# Patient Record
Sex: FEMALE | Race: WHITE | NOT HISPANIC OR LATINO | Employment: FULL TIME | URBAN - NONMETROPOLITAN AREA
[De-identification: names, ages, dates, MRNs, and addresses within clinical notes are randomized per-mention and may not be internally consistent; named-entity substitution may affect disease eponyms.]

---

## 2017-06-13 ENCOUNTER — HOSPITAL ENCOUNTER (EMERGENCY)
Facility: HOSPITAL | Age: 52
Discharge: HOME | End: 2017-06-13
Attending: FAMILY MEDICINE | Admitting: FAMILY MEDICINE
Payer: COMMERCIAL

## 2017-06-13 VITALS
HEART RATE: 64 BPM | RESPIRATION RATE: 16 BRPM | HEIGHT: 65 IN | SYSTOLIC BLOOD PRESSURE: 114 MMHG | OXYGEN SATURATION: 97 % | TEMPERATURE: 98.3 F | WEIGHT: 160 LBS | BODY MASS INDEX: 26.66 KG/M2 | DIASTOLIC BLOOD PRESSURE: 75 MMHG

## 2017-06-13 DIAGNOSIS — J98.01 BRONCHOSPASM: ICD-10-CM

## 2017-06-13 DIAGNOSIS — M94.0 COSTOCHONDRITIS: ICD-10-CM

## 2017-06-13 DIAGNOSIS — K21.00 GASTROESOPHAGEAL REFLUX DISEASE WITH ESOPHAGITIS: ICD-10-CM

## 2017-06-13 LAB
ANION GAP SERPL CALCULATED.3IONS-SCNC: 7 MMOL/L (ref 3–14)
BASOPHILS # BLD AUTO: 0.1 10E9/L (ref 0–0.2)
BASOPHILS NFR BLD AUTO: 0.9 %
BUN SERPL-MCNC: 10 MG/DL (ref 7–30)
CALCIUM SERPL-MCNC: 9 MG/DL (ref 8.5–10.1)
CHLORIDE SERPL-SCNC: 105 MMOL/L (ref 94–109)
CO2 SERPL-SCNC: 27 MMOL/L (ref 20–32)
CREAT SERPL-MCNC: 0.94 MG/DL (ref 0.52–1.04)
DIFFERENTIAL METHOD BLD: NORMAL
EOSINOPHIL # BLD AUTO: 0.3 10E9/L (ref 0–0.7)
EOSINOPHIL NFR BLD AUTO: 4.6 %
ERYTHROCYTE [DISTWIDTH] IN BLOOD BY AUTOMATED COUNT: 12 % (ref 10–15)
GFR SERPL CREATININE-BSD FRML MDRD: 63 ML/MIN/1.7M2
GLUCOSE SERPL-MCNC: 91 MG/DL (ref 70–99)
HCT VFR BLD AUTO: 42.3 % (ref 35–47)
HGB BLD-MCNC: 14.6 G/DL (ref 11.7–15.7)
IMM GRANULOCYTES # BLD: 0 10E9/L (ref 0–0.4)
IMM GRANULOCYTES NFR BLD: 0.3 %
LYMPHOCYTES # BLD AUTO: 1.9 10E9/L (ref 0.8–5.3)
LYMPHOCYTES NFR BLD AUTO: 25.2 %
MCH RBC QN AUTO: 32.2 PG (ref 26.5–33)
MCHC RBC AUTO-ENTMCNC: 34.5 G/DL (ref 31.5–36.5)
MCV RBC AUTO: 93 FL (ref 78–100)
MONOCYTES # BLD AUTO: 0.8 10E9/L (ref 0–1.3)
MONOCYTES NFR BLD AUTO: 11.1 %
NEUTROPHILS # BLD AUTO: 4.3 10E9/L (ref 1.6–8.3)
NEUTROPHILS NFR BLD AUTO: 57.9 %
NRBC # BLD AUTO: 0 10*3/UL
NRBC BLD AUTO-RTO: 0 /100
PLATELET # BLD AUTO: 264 10E9/L (ref 150–450)
POTASSIUM SERPL-SCNC: 3.9 MMOL/L (ref 3.4–5.3)
RBC # BLD AUTO: 4.54 10E12/L (ref 3.8–5.2)
SODIUM SERPL-SCNC: 139 MMOL/L (ref 133–144)
TROPONIN I SERPL-MCNC: NORMAL UG/L (ref 0–0.04)
WBC # BLD AUTO: 7.4 10E9/L (ref 4–11)

## 2017-06-13 PROCEDURE — 36415 COLL VENOUS BLD VENIPUNCTURE: CPT | Performed by: FAMILY MEDICINE

## 2017-06-13 PROCEDURE — 96375 TX/PRO/DX INJ NEW DRUG ADDON: CPT

## 2017-06-13 PROCEDURE — 94664 DEMO&/EVAL PT USE INHALER: CPT | Mod: 59

## 2017-06-13 PROCEDURE — 84484 ASSAY OF TROPONIN QUANT: CPT | Performed by: FAMILY MEDICINE

## 2017-06-13 PROCEDURE — 94640 AIRWAY INHALATION TREATMENT: CPT

## 2017-06-13 PROCEDURE — 93010 ELECTROCARDIOGRAM REPORT: CPT | Performed by: INTERNAL MEDICINE

## 2017-06-13 PROCEDURE — 25000125 ZZHC RX 250: Performed by: FAMILY MEDICINE

## 2017-06-13 PROCEDURE — 80048 BASIC METABOLIC PNL TOTAL CA: CPT | Performed by: FAMILY MEDICINE

## 2017-06-13 PROCEDURE — 71020 ZZHC CHEST TWO VIEWS, FRONT/LAT: CPT | Mod: TC

## 2017-06-13 PROCEDURE — 25000132 ZZH RX MED GY IP 250 OP 250 PS 637: Performed by: FAMILY MEDICINE

## 2017-06-13 PROCEDURE — 85025 COMPLETE CBC W/AUTO DIFF WBC: CPT | Performed by: FAMILY MEDICINE

## 2017-06-13 PROCEDURE — 99285 EMERGENCY DEPT VISIT HI MDM: CPT | Performed by: FAMILY MEDICINE

## 2017-06-13 PROCEDURE — 93005 ELECTROCARDIOGRAM TRACING: CPT

## 2017-06-13 PROCEDURE — 25000128 H RX IP 250 OP 636: Performed by: FAMILY MEDICINE

## 2017-06-13 PROCEDURE — 99285 EMERGENCY DEPT VISIT HI MDM: CPT | Mod: 25

## 2017-06-13 PROCEDURE — 96374 THER/PROPH/DIAG INJ IV PUSH: CPT

## 2017-06-13 RX ORDER — IBUPROFEN 200 MG
600 TABLET ORAL EVERY 6 HOURS PRN
Qty: 60 TABLET | Refills: 0 | COMMUNITY
Start: 2017-06-13

## 2017-06-13 RX ORDER — METHYLPREDNISOLONE SODIUM SUCCINATE 125 MG/2ML
125 INJECTION, POWDER, LYOPHILIZED, FOR SOLUTION INTRAMUSCULAR; INTRAVENOUS ONCE
Status: COMPLETED | OUTPATIENT
Start: 2017-06-13 | End: 2017-06-13

## 2017-06-13 RX ORDER — ONDANSETRON 2 MG/ML
4 INJECTION INTRAMUSCULAR; INTRAVENOUS ONCE
Status: COMPLETED | OUTPATIENT
Start: 2017-06-13 | End: 2017-06-13

## 2017-06-13 RX ORDER — LANSOPRAZOLE 30 MG/1
30 CAPSULE, DELAYED RELEASE ORAL 2 TIMES DAILY
Qty: 30 CAPSULE | Refills: 0 | Status: SHIPPED | OUTPATIENT
Start: 2017-06-13 | End: 2019-07-17

## 2017-06-13 RX ORDER — IPRATROPIUM BROMIDE AND ALBUTEROL SULFATE 2.5; .5 MG/3ML; MG/3ML
3 SOLUTION RESPIRATORY (INHALATION)
Status: DISCONTINUED | OUTPATIENT
Start: 2017-06-13 | End: 2017-06-13 | Stop reason: HOSPADM

## 2017-06-13 RX ORDER — ALBUTEROL SULFATE 90 UG/1
2 AEROSOL, METERED RESPIRATORY (INHALATION) 4 TIMES DAILY
Qty: 1 INHALER | Refills: 0 | Status: SHIPPED | OUTPATIENT
Start: 2017-06-13

## 2017-06-13 RX ORDER — ASPIRIN 81 MG/1
324 TABLET, CHEWABLE ORAL ONCE
Status: COMPLETED | OUTPATIENT
Start: 2017-06-13 | End: 2017-06-13

## 2017-06-13 RX ORDER — KETOROLAC TROMETHAMINE 30 MG/ML
30 INJECTION, SOLUTION INTRAMUSCULAR; INTRAVENOUS ONCE
Status: COMPLETED | OUTPATIENT
Start: 2017-06-13 | End: 2017-06-13

## 2017-06-13 RX ADMIN — ASPIRIN 81 MG 324 MG: 81 TABLET ORAL at 13:59

## 2017-06-13 RX ADMIN — ONDANSETRON 4 MG: 2 INJECTION, SOLUTION INTRAMUSCULAR; INTRAVENOUS at 14:02

## 2017-06-13 RX ADMIN — METHYLPREDNISOLONE SODIUM SUCCINATE 125 MG: 125 INJECTION, POWDER, FOR SOLUTION INTRAMUSCULAR; INTRAVENOUS at 14:00

## 2017-06-13 RX ADMIN — IPRATROPIUM BROMIDE AND ALBUTEROL SULFATE 3 ML: .5; 3 SOLUTION RESPIRATORY (INHALATION) at 13:57

## 2017-06-13 RX ADMIN — KETOROLAC TROMETHAMINE 30 MG: 30 INJECTION, SOLUTION INTRAMUSCULAR at 16:23

## 2017-06-13 ASSESSMENT — ENCOUNTER SYMPTOMS
MUSCULOSKELETAL NEGATIVE: 1
PSYCHIATRIC NEGATIVE: 1
VOMITING: 0
LIGHT-HEADEDNESS: 0
DIAPHORESIS: 0
ACTIVITY CHANGE: 1
FATIGUE: 0
ABDOMINAL PAIN: 0
PALPITATIONS: 0
NAUSEA: 1
DIARRHEA: 0
SHORTNESS OF BREATH: 0
DIZZINESS: 0
CONSTIPATION: 0
DYSURIA: 0
WEAKNESS: 0
HEADACHES: 0

## 2017-06-13 NOTE — ED AVS SNAPSHOT
[facility]    [address] [address]    Phone:  [phone]                                       Debra Park   MRN: [id number]    Department:  [facility]   Date of Visit:  6/13/2017           Patient Information     Date Of Birth          1965        Your diagnoses for this visit were:     Bronchospasm     Gastroesophageal reflux disease with esophagitis     Costochondritis        You were seen by Saskia Parada MD.      Follow-up Information     Follow up with Kavon Morales DO In 1 week.    Specialty:  Family Practice    Why:  follow up ED    Contact information:    [facility]  [address] [facility] [address]  [phone]          Discharge Instructions         Costochondritis  Costochondritis is inflammation of a rib or the cartilage that connects a rib to your breastbone (sternum). It causes tenderness, and sometimes chest pain may be sharp or aching, or it may feel like pressure. Pain may get worse with deep breathing, movement, or exercise. In some cases, the pain is mistaken for a heart attack. Despite this, the condition is not serious. Read on to learn more about the condition and how it can be treated.    What causes costochondritis?  The cause of costochondritis is not completely clear, but it may happen after a chest injury, chest infection or coughing episode. Some physical activities can sometimes lead to costochondritis. Large-breasted women may be more likely to have the condition. Often, the reason for the inflammation is unknown.  Diagnosing costochondritis  There is no test for costochrondritis. The condition is diagnosed by the symptoms you have. In some cases, tests are done to rule out more serious problems. These tests may include imaging tests such as chest X-ray or CT scan.  Treating costochondritis  If an underlying cause is found, treatment for that will likely relieve the problem.  Costochondritis often goes away on its own. The course of the condition varies from person to person. It usually lasts from weeks to months. In some cases, mild symptoms continue for months to years. To ease symptoms:    Take medications as directed. These relieve pain and swelling. Ibuprofen or other NSAIDs are often recommended. In some cases, you may be given prescription medication, such as muscle relaxants.    Avoid activities that put stress on the chest or spine.    Apply a heating pad (set to warm, not to high, heat) to the breastbone several times a day.    Perform stretching exercises as directed  Call the health care provider right away if you have any of the following:    Pain that is not relieved by medication    Shortness of breath    Lightheadedness, dizziness, or fainting    Feeling of irregular heartbeat or fast pulse  Anyone with chest pain should see a doctor, especially those who are older and may be at risk for heart disease.     9615-4668 [employer]. [address] [place] [address]. All rights reserved. This information is not intended as a substitute for professional medical care. Always follow your healthcare professional's instructions.          Bronchospasm (Adult)    Bronchospasm occurs when the airways (bronchial tubes) go into spasm and contract. This makes it hard to breathe and causes wheezing (a high-pitched whistling sound). Bronchospasm can also cause frequent coughing without wheezing.  Bronchospasm is due to irritation, inflammation, or allergic reaction of the airways. People with asthma get bronchospasm. However, not everyone with bronchospasm has asthma.  Being exposed to harmful fumes, a recent case of bronchitis, exercise, or a flare-up of chronic obstructive pulmonary disease (COPD) may cause the airways to spasm. An episode of bronchospasm may last 7 to 14 days. Medicine may be prescribed to relax the airways and prevent wheezing. Antibiotics will  be prescribed only if your healthcare provider thinks there is a bacterial infection. Antibiotics do not help a viral infection.  Home care     Drink lots of water or other fluids (at least 10 glasses a day) during an attack. This will loosen lung secretions and make it easier to breathe. If you have heart or kidney disease, check with your doctor before you drink extra fluids.    Take prescribed medicine exactly at the times advised. If you take an inhaled medicine to help with breathing, do not use it more than once every 4 hours, unless told to do so. If prescribed an antibiotic or prednisone, take all of the medicine, even if you are feeling better after a few days.    Do not smoke. Also avoid being exposed to secondhand smoke.    If you were given an inhaler, use it exactly as directed. If you need to use it more often than prescribed, your condition may be getting worse. Contact your healthcare provider.  Follow-up care  Follow up with your healthcare provider, or as advised.   (Note: If you are age 65 or older, have a chronic lung disease or condition that affects your immune system, or you smoke, we recommend getting pneumococcal vaccinations, as well as an influenza vaccination (flu shot) every autumn. Ask your healthcare provider about this.)  When to seek medical advice  Call your healthcare provider right away if any of these occur:    You need to use your inhalers more often than usual.    You develop a fever of 100.4 F (38 C) or higher.    You are coughing up lots of dark-colored sputum (mucus).    You do not start to improve within 24 hours.  Call 911, or get immediate medical care  Contact emergency services if any of these occur:    Coughing up bloody sputum (mucus)    Chest pain with each breath    Increased wheezing or shortness of breath    8445-8574 [employer]. [address] [place] [address], [place] [address]. All rights reserved. This information is not intended as a substitute for  professional medical care. Always follow your healthcare professional's instructions.          Tips to Control Acid Reflux  To control acid reflux, you ll need to make some basic diet and lifestyle changes. The simple steps outlined below may be all you ll need to relieve discomfort.  Watch What You Eat      Avoid fatty foods and spicy foods.    Eat fewer acidic foods, such as citrus and tomato-based foods. These can increase symptoms.    Limit drinking alcohol, caffeine, and fizzy beverages. All increase acid reflux.    Try limiting chocolate, peppermint, and spearmint. These can worsen acid reflux in some people.  Watch When You Eat    Avoid lying down for 3 hours after eating.    Do not snack before going to bed.  Raise Your Head    Raising your head and upper body by 4 inches to 6 inches helps limit reflux when you re lying down. Put blocks under the head of the bed frame to raise it.  Other Changes    Lose weight, if you need to.    Don t work out near bedtime.    Avoid tight-fitting clothes.    Limit aspirin and ibuprofen.    Stop smoking.     8052-6263 [employer]. [address] [place] [address]. All rights reserved. This information is not intended as a substitute for professional medical care. Always follow your healthcare professional's instructions.             Review of your medicines      START taking        Dose / Directions Last dose taken    albuterol 108 (90 BASE) MCG/ACT Inhaler   Commonly known as:  albuterol   Dose:  2 puff   Quantity:  1 Inhaler        Inhale 2 puffs into the lungs 4 times daily And PRN   Refills:  0        ibuprofen 200 MG tablet   Commonly known as:  ADVIL/MOTRIN   Dose:  600 mg   Quantity:  60 tablet        Take 3 tablets (600 mg) by mouth every 6 hours as needed for pain   Refills:  0          CONTINUE these medicines which may have CHANGED, or have new prescriptions. If we are uncertain of the size of tablets/capsules you have at home, strength may  be listed as something that might have changed.        Dose / Directions Last dose taken    LANsoprazole 30 MG CR capsule   Commonly known as:  PREVACID   Dose:  30 mg   What changed:    - medication strength  - how much to take  - when to take this  - additional instructions   Quantity:  30 capsule        Take 1 capsule (30 mg) by mouth 2 times daily For 2 weeks, then daily.   Refills:  0          Our records show that you are taking the medicines listed below. If these are incorrect, please call your family doctor or clinic.        Dose / Directions Last dose taken    CELEXA PO   Dose:  40 mg        Take 40 mg by mouth daily.   Refills:  0        HYDROcodone-acetaminophen 5-325 MG per tablet   Commonly known as:  NORCO   Dose:  1 tablet        Take 1 tablet by mouth every 6 hours as needed.   Refills:  0        WELLBUTRIN PO   Dose:  150 mg        Take 150 mg by mouth daily   Refills:  0                Prescriptions were sent or printed at these locations (3 Prescriptions)                   [facility] [address] [address]    Telephone:  [phone]   Fax:  [phone]   Hours:                  E-Prescribed (2 of 3)         LANsoprazole (PREVACID) 30 MG CR capsule               albuterol (ALBUTEROL) 108 (90 BASE) MCG/ACT Inhaler                 Not Printed or Sent (1 of 3)         ibuprofen (ADVIL/MOTRIN) 200 MG tablet                Procedures and tests performed during your visit     Basic metabolic panel    CBC with platelets differential    Cardiac Continuous Monitoring    Peripheral IV: Standard    Pulse oximetry nursing    Troponin I    XR Chest 2 Views      Orders Needing Specimen Collection     None      Pending Results     No orders found from 6/11/2017 to 6/14/2017.            Pending Culture Results     No orders found from 6/11/2017 to 6/14/2017.            Thank you for choosing [facility]       Thank you for choosing [facility] for  "your care. Our goal is always to provide you with excellent care. Hearing back from our patients is one way we can continue to improve our services. Please take a few minutes to complete the written survey that you may receive in the mail after you visit with us. Thank you!        [employer] Information     [email or web address] lets you send messages to your doctor, view your test results, renew your prescriptions, schedule appointments and more. To sign up, go to [email or web address] . Click on \"Log in\" on the left side of the screen, which will take you to the Welcome page. Then click on \"Sign up Now\" on the right side of the page.     You will be asked to enter the access code listed below, as well as some personal information. Please follow the directions to create your username and password.     Your access code is: [email or web address]  Expires: 2017  5:15 PM     Your access code will  in 90 days. If you need help or a new code, please call your [facility] clinic or [phone].        Care EveryWhere ID     This is your Care EveryWhere ID. This could be used by other organizations to access your [facility] medical records  [id number]        After Visit Summary       This is your record. Keep this with you and show to your community pharmacist(s) and doctor(s) at your next visit.                  "

## 2017-06-13 NOTE — DISCHARGE INSTRUCTIONS
Costochondritis  Costochondritis is inflammation of a rib or the cartilage that connects a rib to your breastbone (sternum). It causes tenderness, and sometimes chest pain may be sharp or aching, or it may feel like pressure. Pain may get worse with deep breathing, movement, or exercise. In some cases, the pain is mistaken for a heart attack. Despite this, the condition is not serious. Read on to learn more about the condition and how it can be treated.    What causes costochondritis?  The cause of costochondritis is not completely clear, but it may happen after a chest injury, chest infection or coughing episode. Some physical activities can sometimes lead to costochondritis. Large-breasted women may be more likely to have the condition. Often, the reason for the inflammation is unknown.  Diagnosing costochondritis  There is no test for costochrondritis. The condition is diagnosed by the symptoms you have. In some cases, tests are done to rule out more serious problems. These tests may include imaging tests such as chest X-ray or CT scan.  Treating costochondritis  If an underlying cause is found, treatment for that will likely relieve the problem. Costochondritis often goes away on its own. The course of the condition varies from person to person. It usually lasts from weeks to months. In some cases, mild symptoms continue for months to years. To ease symptoms:    Take medications as directed. These relieve pain and swelling. Ibuprofen or other NSAIDs are often recommended. In some cases, you may be given prescription medication, such as muscle relaxants.    Avoid activities that put stress on the chest or spine.    Apply a heating pad (set to warm, not to high, heat) to the breastbone several times a day.    Perform stretching exercises as directed  Call the health care provider right away if you have any of the following:    Pain that is not relieved by medication    Shortness of breath    Lightheadedness,  dizziness, or fainting    Feeling of irregular heartbeat or fast pulse  Anyone with chest pain should see a doctor, especially those who are older and may be at risk for heart disease.     4256-9692 [employer]. [address] [place] [address], [place] [address]. All rights reserved. This information is not intended as a substitute for professional medical care. Always follow your healthcare professional's instructions.          Bronchospasm (Adult)    Bronchospasm occurs when the airways (bronchial tubes) go into spasm and contract. This makes it hard to breathe and causes wheezing (a high-pitched whistling sound). Bronchospasm can also cause frequent coughing without wheezing.  Bronchospasm is due to irritation, inflammation, or allergic reaction of the airways. People with asthma get bronchospasm. However, not everyone with bronchospasm has asthma.  Being exposed to harmful fumes, a recent case of bronchitis, exercise, or a flare-up of chronic obstructive pulmonary disease (COPD) may cause the airways to spasm. An episode of bronchospasm may last 7 to 14 days. Medicine may be prescribed to relax the airways and prevent wheezing. Antibiotics will be prescribed only if your healthcare provider thinks there is a bacterial infection. Antibiotics do not help a viral infection.  Home care     Drink lots of water or other fluids (at least 10 glasses a day) during an attack. This will loosen lung secretions and make it easier to breathe. If you have heart or kidney disease, check with your doctor before you drink extra fluids.    Take prescribed medicine exactly at the times advised. If you take an inhaled medicine to help with breathing, do not use it more than once every 4 hours, unless told to do so. If prescribed an antibiotic or prednisone, take all of the medicine, even if you are feeling better after a few days.    Do not smoke. Also avoid being exposed to secondhand smoke.    If you were given an inhaler,  use it exactly as directed. If you need to use it more often than prescribed, your condition may be getting worse. Contact your healthcare provider.  Follow-up care  Follow up with your healthcare provider, or as advised.   (Note: If you are age 65 or older, have a chronic lung disease or condition that affects your immune system, or you smoke, we recommend getting pneumococcal vaccinations, as well as an influenza vaccination (flu shot) every autumn. Ask your healthcare provider about this.)  When to seek medical advice  Call your healthcare provider right away if any of these occur:    You need to use your inhalers more often than usual.    You develop a fever of 100.4 F (38 C) or higher.    You are coughing up lots of dark-colored sputum (mucus).    You do not start to improve within 24 hours.  Call 911, or get immediate medical care  Contact emergency services if any of these occur:    Coughing up bloody sputum (mucus)    Chest pain with each breath    Increased wheezing or shortness of breath    3068-2779 [employer]. [address] [place] [address]. All rights reserved. This information is not intended as a substitute for professional medical care. Always follow your healthcare professional's instructions.          Tips to Control Acid Reflux  To control acid reflux, you ll need to make some basic diet and lifestyle changes. The simple steps outlined below may be all you ll need to relieve discomfort.  Watch What You Eat      Avoid fatty foods and spicy foods.    Eat fewer acidic foods, such as citrus and tomato-based foods. These can increase symptoms.    Limit drinking alcohol, caffeine, and fizzy beverages. All increase acid reflux.    Try limiting chocolate, peppermint, and spearmint. These can worsen acid reflux in some people.  Watch When You Eat    Avoid lying down for 3 hours after eating.    Do not snack before going to bed.  Raise Your Head    Raising your head and upper body  by 4 inches to 6 inches helps limit reflux when you re lying down. Put blocks under the head of the bed frame to raise it.  Other Changes    Lose weight, if you need to.    Don t work out near bedtime.    Avoid tight-fitting clothes.    Limit aspirin and ibuprofen.    Stop smoking.     2248-0436 [employer]. [address] [place] [address], [place] [address]. All rights reserved. This information is not intended as a substitute for professional medical care. Always follow your healthcare professional's instructions.

## 2017-06-13 NOTE — ED AVS SNAPSHOT
[facility]    [address]    [place] [address]    Phone:  [phone]                                       Debra Park   MRN: [id number]    Department[facility]  [facility]   Date of Visit:  6/13/2017           After Visit Summary Signature Page     I have received my discharge instructions, and my questions have been answered. I have discussed any challenges I see with this plan with the nurse or doctor.    ..........................................................................................................................................  Patient/Patient Representative Signature      ..........................................................................................................................................  Patient Representative Print Name and Relationship to Patient    ..................................................               ................................................  Date                                            Time    ..........................................................................................................................................  Reviewed by Signature/Title    ...................................................              ..............................................  Date                                                            Time

## 2017-06-13 NOTE — ED PROVIDER NOTES
"  History     Chief Complaint   Patient presents with     Chest Pain     Started this am at 0900. Patient also C/O right lower rib pain and nausea, intermittent, X 3 days     HPI  Debra Park is a 52 year old female who has pressure-like chest pain in her upper chest radiating to her left scapula.  She also has pain in her right lower ribs, and has had intermittent nausea for 3 days.  Her pain does not increase with exertion.  She does have seasonal allergies, and has noted herself wheezing, especially at night.  She has no past history of cardiac issues, but has had GERD.  She is on Prevacid for this.      I have reviewed the Medications, Allergies, Past Medical and Surgical History, and Social History in the Epic system.    Allergies:   Allergies   Allergen Reactions     Augmentin Nausea and Vomiting     Cats Swelling         No current facility-administered medications on file prior to encounter.   Current Outpatient Prescriptions on File Prior to Encounter:  BuPROPion HCl (WELLBUTRIN PO) Take 150 mg by mouth daily    Citalopram Hydrobromide (CELEXA PO) Take 40 mg by mouth daily.   HYDROcodone-acetaminophen 5-325 MG per tablet Take 1 tablet by mouth every 6 hours as needed.       Patient Active Problem List   Diagnosis     Facial cellulitis       Past Surgical History:   Procedure Laterality Date     BREAST SURGERY      augmentation     CHOLECYSTECTOMY       ENT SURGERY      T&A     GYN SURGERY      endo ablation     ORTHOPEDIC SURGERY      rotator cuff repair     SOFT TISSUE SURGERY      basal cell ca       Social History   Substance Use Topics     Smoking status: Current Every Day Smoker     Packs/day: 1.00     Years: 35.00     Smokeless tobacco: Never Used     Alcohol use Yes      Comment: social         There is no immunization history on file for this patient.    BMI: Estimated body mass index is 26.63 kg/(m^2) as calculated from the following:    Height as of this encounter: 1.651 m (5' 5\").    Weight " "as of this encounter: 72.6 kg (160 lb).      Review of Systems   Constitutional: Positive for activity change. Negative for diaphoresis and fatigue.   HENT: Negative.    Respiratory: Negative for shortness of breath.    Cardiovascular: Positive for chest pain. Negative for palpitations.        Right lower ribs and sternocostal margin.   Gastrointestinal: Positive for nausea. Negative for abdominal pain, constipation, diarrhea and vomiting.   Genitourinary: Negative for dysuria.   Musculoskeletal: Negative.    Skin: Negative.    Neurological: Negative for dizziness, weakness, light-headedness and headaches.   Psychiatric/Behavioral: Negative.        Physical Exam   BP: (!) 145/102  Pulse: 64  Temp: 98.3  F (36.8  C)  Resp: 14  Height: 165.1 cm (5' 5\")  Weight: 72.6 kg (160 lb)  SpO2: 96 %  Physical Exam   Constitutional: She is oriented to person, place, and time. She appears well-developed and well-nourished. No distress.   HENT:   Head: Normocephalic and atraumatic.   Neck: Normal range of motion. Neck supple.   Cardiovascular: Normal rate, regular rhythm, normal heart sounds and intact distal pulses.    No murmur heard.  Pulmonary/Chest: Effort normal. No respiratory distress. She has wheezes. She exhibits tenderness.   Reproducible chest tenderness on palpation of the lower ribs on right, sternocostal border.  End expiratory wheezes throughout with prolonged expiratory phase.   Abdominal: Soft. Bowel sounds are normal. She exhibits no distension. There is no tenderness.   Musculoskeletal: Normal range of motion.   Neurological: She is alert and oriented to person, place, and time.   Skin: Skin is warm and dry.   Psychiatric: She has a normal mood and affect.   Nursing note and vitals reviewed.      ED Course     ED Course     Procedures             EKG Interpretation:      Interpreted by Saskia Smith  Time reviewed: 1335  Symptoms at time of EKG: chest pressure   Rhythm: normal sinus   Rate: " normal  Axis: left axis deviation  Ectopy: none  Conduction: normal  ST Segments/ T Waves: No ST-T wave changes  Q Waves: none  Comparison to prior: No previous EKG for comparison    Clinical Impression: normal EKG excepting axis    Labs Ordered and Resulted from Time of ED Arrival Up to the Time of Departure from the ED   CBC WITH PLATELETS DIFFERENTIAL   BASIC METABOLIC PANEL   TROPONIN I   PULSE OXIMETRY NURSING   CARDIAC CONTINUOUS MONITORING   PERIPHERAL IV CATHETER       Assessments & Plan (with Medical Decision Making)   Patient's chest pressure improved with nebulizer treatment, solumedrol.  Will discharge on Albuterol MDI, inhaler teaching by RT.  Chest pain in lower ribs is reproducible, will treat with ibuprofen since Toradol helped.  Will also increase Prevacid to BID for 2 weeks, then back to daily.  Follow up with Dr. Morales in a week.    I have reviewed the nursing notes.    I have reviewed the findings, diagnosis, plan and need for follow up with the patient.       New Prescriptions    ALBUTEROL (ALBUTEROL) 108 (90 BASE) MCG/ACT INHALER    Inhale 2 puffs into the lungs 4 times daily And PRN    IBUPROFEN (ADVIL/MOTRIN) 200 MG TABLET    Take 3 tablets (600 mg) by mouth every 6 hours as needed for pain       Final diagnoses:   Bronchospasm   Gastroesophageal reflux disease with esophagitis   Costochondritis       6/13/2017   [facility]     Saskia Parada MD  06/13/17 7713

## 2017-06-14 NOTE — PROGRESS NOTES
Pt instructed on MDI use and care with spacer via instruction video. Pt understood instruct and was sent with paper instructions and an opti chamber.

## 2017-09-23 ENCOUNTER — HOSPITAL ENCOUNTER (EMERGENCY)
Facility: HOSPITAL | Age: 52
Discharge: HOME | End: 2017-09-23
Attending: FAMILY MEDICINE | Admitting: FAMILY MEDICINE
Payer: COMMERCIAL

## 2017-09-23 VITALS
SYSTOLIC BLOOD PRESSURE: 147 MMHG | OXYGEN SATURATION: 94 % | RESPIRATION RATE: 16 BRPM | TEMPERATURE: 98.1 F | DIASTOLIC BLOOD PRESSURE: 100 MMHG | HEART RATE: 76 BPM

## 2017-09-23 DIAGNOSIS — R10.11 ABDOMINAL PAIN, RIGHT UPPER QUADRANT: ICD-10-CM

## 2017-09-23 LAB
ALBUMIN SERPL-MCNC: 4.1 G/DL (ref 3.4–5)
ALBUMIN UR-MCNC: NEGATIVE MG/DL
ALP SERPL-CCNC: 44 U/L (ref 40–150)
ALT SERPL W P-5'-P-CCNC: 27 U/L (ref 0–50)
ANION GAP SERPL CALCULATED.3IONS-SCNC: 5 MMOL/L (ref 3–14)
APPEARANCE UR: CLEAR
AST SERPL W P-5'-P-CCNC: 23 U/L (ref 0–45)
BACTERIA #/AREA URNS HPF: ABNORMAL /HPF
BASOPHILS # BLD AUTO: 0.1 10E9/L (ref 0–0.2)
BASOPHILS NFR BLD AUTO: 0.5 %
BILIRUB SERPL-MCNC: 0.3 MG/DL (ref 0.2–1.3)
BILIRUB UR QL STRIP: NEGATIVE
BUN SERPL-MCNC: 9 MG/DL (ref 7–30)
CALCIUM SERPL-MCNC: 9.1 MG/DL (ref 8.5–10.1)
CHLORIDE SERPL-SCNC: 106 MMOL/L (ref 94–109)
CO2 SERPL-SCNC: 27 MMOL/L (ref 20–32)
COLOR UR AUTO: ABNORMAL
CREAT SERPL-MCNC: 0.87 MG/DL (ref 0.52–1.04)
CRP SERPL-MCNC: <2.9 MG/L (ref 0–8)
DIFFERENTIAL METHOD BLD: NORMAL
EOSINOPHIL # BLD AUTO: 0.2 10E9/L (ref 0–0.7)
EOSINOPHIL NFR BLD AUTO: 2.2 %
ERYTHROCYTE [DISTWIDTH] IN BLOOD BY AUTOMATED COUNT: 12.2 % (ref 10–15)
GFR SERPL CREATININE-BSD FRML MDRD: 69 ML/MIN/1.7M2
GLUCOSE SERPL-MCNC: 83 MG/DL (ref 70–99)
GLUCOSE UR STRIP-MCNC: NEGATIVE MG/DL
HCT VFR BLD AUTO: 41.2 % (ref 35–47)
HGB BLD-MCNC: 14.5 G/DL (ref 11.7–15.7)
HGB UR QL STRIP: ABNORMAL
IMM GRANULOCYTES # BLD: 0 10E9/L (ref 0–0.4)
IMM GRANULOCYTES NFR BLD: 0.3 %
KETONES UR STRIP-MCNC: NEGATIVE MG/DL
LEUKOCYTE ESTERASE UR QL STRIP: NEGATIVE
LIPASE SERPL-CCNC: 153 U/L (ref 73–393)
LYMPHOCYTES # BLD AUTO: 2 10E9/L (ref 0.8–5.3)
LYMPHOCYTES NFR BLD AUTO: 18.7 %
MCH RBC QN AUTO: 32.7 PG (ref 26.5–33)
MCHC RBC AUTO-ENTMCNC: 35.2 G/DL (ref 31.5–36.5)
MCV RBC AUTO: 93 FL (ref 78–100)
MONOCYTES # BLD AUTO: 1 10E9/L (ref 0–1.3)
MONOCYTES NFR BLD AUTO: 9 %
MUCOUS THREADS #/AREA URNS LPF: PRESENT /LPF
NEUTROPHILS # BLD AUTO: 7.4 10E9/L (ref 1.6–8.3)
NEUTROPHILS NFR BLD AUTO: 69.3 %
NITRATE UR QL: NEGATIVE
NRBC # BLD AUTO: 0 10*3/UL
NRBC BLD AUTO-RTO: 0 /100
PH UR STRIP: 6.5 PH (ref 4.7–8)
PLATELET # BLD AUTO: 242 10E9/L (ref 150–450)
POTASSIUM SERPL-SCNC: 3.9 MMOL/L (ref 3.4–5.3)
PROT SERPL-MCNC: 7.2 G/DL (ref 6.8–8.8)
RBC # BLD AUTO: 4.43 10E12/L (ref 3.8–5.2)
RBC #/AREA URNS AUTO: 2 /HPF (ref 0–2)
SODIUM SERPL-SCNC: 138 MMOL/L (ref 133–144)
SOURCE: ABNORMAL
SP GR UR STRIP: 1 (ref 1–1.03)
UROBILINOGEN UR STRIP-MCNC: NORMAL MG/DL (ref 0–2)
WBC # BLD AUTO: 10.6 10E9/L (ref 4–11)
WBC #/AREA URNS AUTO: <1 /HPF (ref 0–2)

## 2017-09-23 PROCEDURE — 86140 C-REACTIVE PROTEIN: CPT | Performed by: FAMILY MEDICINE

## 2017-09-23 PROCEDURE — 80053 COMPREHEN METABOLIC PANEL: CPT | Performed by: FAMILY MEDICINE

## 2017-09-23 PROCEDURE — 74177 CT ABD & PELVIS W/CONTRAST: CPT | Mod: TC

## 2017-09-23 PROCEDURE — 99285 EMERGENCY DEPT VISIT HI MDM: CPT | Mod: 25

## 2017-09-23 PROCEDURE — 74020 ZZHC X-RAY ABDOMEN COMPLETE: CPT | Mod: TC

## 2017-09-23 PROCEDURE — 25000125 ZZHC RX 250: Performed by: FAMILY MEDICINE

## 2017-09-23 PROCEDURE — 36415 COLL VENOUS BLD VENIPUNCTURE: CPT | Performed by: FAMILY MEDICINE

## 2017-09-23 PROCEDURE — 85025 COMPLETE CBC W/AUTO DIFF WBC: CPT | Performed by: FAMILY MEDICINE

## 2017-09-23 PROCEDURE — 99285 EMERGENCY DEPT VISIT HI MDM: CPT | Performed by: FAMILY MEDICINE

## 2017-09-23 PROCEDURE — 25000132 ZZH RX MED GY IP 250 OP 250 PS 637: Performed by: FAMILY MEDICINE

## 2017-09-23 PROCEDURE — S0028 INJECTION, FAMOTIDINE, 20 MG: HCPCS | Performed by: FAMILY MEDICINE

## 2017-09-23 PROCEDURE — 83690 ASSAY OF LIPASE: CPT | Performed by: FAMILY MEDICINE

## 2017-09-23 PROCEDURE — 96374 THER/PROPH/DIAG INJ IV PUSH: CPT | Mod: 59

## 2017-09-23 PROCEDURE — 81001 URINALYSIS AUTO W/SCOPE: CPT | Performed by: FAMILY MEDICINE

## 2017-09-23 RX ORDER — DICYCLOMINE HYDROCHLORIDE 10 MG/1
10 CAPSULE ORAL
Qty: 16 CAPSULE | Refills: 0 | Status: SHIPPED | OUTPATIENT
Start: 2017-09-23 | End: 2018-10-25

## 2017-09-23 RX ORDER — IOPAMIDOL 612 MG/ML
100 INJECTION, SOLUTION INTRAVASCULAR ONCE
Status: COMPLETED | OUTPATIENT
Start: 2017-09-23 | End: 2017-09-23

## 2017-09-23 RX ORDER — DICYCLOMINE HYDROCHLORIDE 10 MG/1
10 CAPSULE ORAL
Qty: 15 CAPSULE | Refills: 0 | Status: SHIPPED | OUTPATIENT
Start: 2017-09-23 | End: 2018-10-25

## 2017-09-23 RX ADMIN — FAMOTIDINE 20 MG: 10 INJECTION, SOLUTION INTRAVENOUS at 17:54

## 2017-09-23 RX ADMIN — IOPAMIDOL 100 ML: 612 INJECTION, SOLUTION INTRAVASCULAR at 18:49

## 2017-09-23 RX ADMIN — LIDOCAINE HYDROCHLORIDE 30 ML: 20 SOLUTION ORAL; TOPICAL at 17:54

## 2017-09-23 ASSESSMENT — PAIN DESCRIPTION - DESCRIPTORS: DESCRIPTORS: ACHING

## 2017-09-23 NOTE — DISCHARGE INSTRUCTIONS
What to expect when you have contrast    During your exam, we will inject  contrast  into your vein or artery. (Contrast is a clear liquid with iodine in it. It shows up on X-rays.)    You may feel warm or hot. You may have a metal taste in your mouth and a slight upset stomach. You may also feel pressure near the kidneys and bladder. These effects will last about 1 to 3 minutes.    Please tell us if you have:    Sneezing     Itching    Hives     Swelling in the face    A hoarse voice    Breathing problems    Other new symptoms    Serious problems are rare.  They may include:    Irregular heartbeat     Seizures    Kidney failure              Tissue damage    Shock      Death    If you have any problems during the exam, we  will treat them right away.    When you get home    Call your hospital if you have any new symptoms in the next 2 days, like hives or swelling. (Phone numbers are at the bottom of this page.) Or call your family doctor.     If you have wheezing or trouble breathing, call 911.    Self-care  -Drink at least 4 extra glasses of water today.   This reduces the stress on your kidneys.  -Keep taking your regular medicines.    The contrast will pass out of your body in your  Urine(pee). This will happen in the next 24 hours. You  will not feel this. Your urine will not  change color.    If you have kidney problems or take metformin    Drink 4 to 8 large glasses of water for the next  2 days, if you are not on a fluid restriction.    ?If you take metformin (Glucophage or Glucovance) for diabetes, keep taking it.      ?Your kidney function tests are abnormal.  If you take Metformin, do not take it for 48 hours. Please go to your clinic for a blood test within 3 days after your exam before the restarting this medicine.     (Note to provider:please give patient prescription for lab tests.)    ?Special instructions: -Drink at least 4 extra glasses of water today.   This reduces the stress on your kidneys.    I  have read and understand the above information.    Patient Sign Here:______________________________________Date:________Time:______    Staff Sign Here:________________________________________Date:_______Time:______      Radiology Departments:     ?[facility]: [phone] ?[place]: [phone]     ?[place]: [phone] ?[facility]:[phone]      ?[facility]: [phone]  ?[place]: [phone]  ?[place]:[phone]    ?[facility]:[phone]  ?[facility]:[phone]

## 2017-09-23 NOTE — ED PROVIDER NOTES
History     Chief Complaint   Patient presents with     Abdominal Pain     RLQ abdominal pain X3 days, worse after eating     HPI  Debra Park is a 52 year old female who presents with a 3 day history of abdominal pain worse after eating Pain worse right upper quadrant.  Pain symptoms start usually right after she eats.  Feels like someone is squeezing side. Usually lasts a couple hours.   Pain currently 4/10  Presently . Had eaten cheese curds and chips . Does not have gall bladder. Still has appendix. NO fever. Slight nausea.  NO vomitting. NO diarrhea. NO coughing no chest pain . Has history of acid reflux . Takes medication every day for that ., Denies black or bloody stools . Had cholecystectomy 7 years ago  I have reviewed the Medications, Allergies, Past Medical and Surgical History, and Social History in the Epic system.    Allergies:   Allergies   Allergen Reactions     Augmentin Nausea and Vomiting     Cats Swelling         No current facility-administered medications on file prior to encounter.   Current Outpatient Prescriptions on File Prior to Encounter:  LANsoprazole (PREVACID) 30 MG CR capsule Take 1 capsule (30 mg) by mouth 2 times daily For 2 weeks, then daily.   albuterol (ALBUTEROL) 108 (90 BASE) MCG/ACT Inhaler Inhale 2 puffs into the lungs 4 times daily And PRN   ibuprofen (ADVIL/MOTRIN) 200 MG tablet Take 3 tablets (600 mg) by mouth every 6 hours as needed for pain   BuPROPion HCl (WELLBUTRIN PO) Take 150 mg by mouth daily    Citalopram Hydrobromide (CELEXA PO) Take 40 mg by mouth daily.   HYDROcodone-acetaminophen 5-325 MG per tablet Take 1 tablet by mouth every 6 hours as needed.       Patient Active Problem List   Diagnosis     Facial cellulitis       Past Surgical History:   Procedure Laterality Date     BREAST SURGERY      augmentation     CHOLECYSTECTOMY       ENT SURGERY      T&A     GYN SURGERY      endo ablation     ORTHOPEDIC SURGERY      rotator cuff repair     SOFT TISSUE  "SURGERY      basal cell ca       Social History   Substance Use Topics     Smoking status: Current Every Day Smoker     Packs/day: 1.00     Years: 35.00     Smokeless tobacco: Never Used     Alcohol use Yes      Comment: social         There is no immunization history on file for this patient.    BMI: Estimated body mass index is 26.63 kg/(m^2) as calculated from the following:    Height as of 6/13/17: 1.651 m (5' 5\").    Weight as of 6/13/17: 72.6 kg (160 lb).      Review of Systems   All other systems reviewed and are negative.      Physical Exam   BP: 124/73  Pulse: 72  Temp: 98  F (36.7  C)  Resp: 16  SpO2: 95 %  Physical Exam   Constitutional: She is oriented to person, place, and time. She appears well-developed and well-nourished. No distress.   HENT:   Head: Normocephalic and atraumatic.   Right Ear: External ear normal.   Left Ear: External ear normal.   Mouth/Throat: Oropharynx is clear and moist.   Eyes: Pupils are equal, round, and reactive to light.   Neck: Normal range of motion. Neck supple. No tracheal deviation present. No thyromegaly present.   Cardiovascular: Normal rate, regular rhythm and normal heart sounds.  Exam reveals no gallop and no friction rub.    No murmur heard.  Pulmonary/Chest: Effort normal and breath sounds normal. No respiratory distress. She has no wheezes. She has no rales.   Abdominal: Soft. She exhibits no distension and no mass. There is tenderness. There is no rebound and no guarding.   Mild diffuse abdominal tenderness increased at ruq and mid epigastric . NO guarding or rebound   Musculoskeletal: Normal range of motion.   Neurological: She is alert and oriented to person, place, and time.   Skin: Skin is warm and dry. She is not diaphoretic.   Psychiatric: She has a normal mood and affect.   Nursing note and vitals reviewed.      ED Course     ED Course     Procedures      Patient arrived to the ER ambulatory . Patient states that abdominal pain is improved at 3-4 upon " arrival . Medical records reviewed . History andphysical exam done . IV inserted. Labs and diagnostics ordered. Initial labs and abdominal xray returned without abnormality . GI cocktail given without any change in symptoms . CT ordered without acute abnormality Discussed negative findings with patient . Patient discharged with prescription for bentyl and recommended follow up with primary care as she may need EGD and small bowel follow through          Labs Ordered and Resulted from Time of ED Arrival Up to the Time of Departure from the ED   ROUTINE UA WITH MICROSCOPIC REFLEX TO CULTURE   COMPREHENSIVE METABOLIC PANEL   CBC WITH PLATELETS DIFFERENTIAL   LIPASE   CRP INFLAMMATION       Assessments & Plan (with Medical Decision Making)     I have reviewed the nursing notes.    I have reviewed the findings, diagnosis, plan and need for follow up with the patient.  Patient without findings to explain abdominal pain . Will discharge with prescription for dicyclomine and recommendations to follow up with prirmary care physician     New Prescriptions    No medications on file       Diagnosis; Abdominal pain , unclear etiology   9/23/2017   [facility]     Jamia Pepper MD  09/25/17 9500

## 2017-09-23 NOTE — ED NOTES
"51 y/o female presents with c/o RLQ/ R sided pain over last 3 days. States it is worse after eating, rates 4/10. Gallbladder removed more than 5 years ago. Last BM this morning - patient states she is \"slightly constipated\" - does take Norco 2x daily. Denies urinary symptoms. Intermittent nausea. IV in place, gown on, call light within reach.  "

## 2017-09-23 NOTE — ED AVS SNAPSHOT
[facility]    [address] [address]    Phone:  [phone]                                       Debra Park   MRN: [id number]    Department:  [facility]   Date of Visit:  9/23/2017           Patient Information     Date Of Birth          1965        Your diagnoses for this visit were:     Abdominal pain, right upper quadrant        You were seen by Jamia Pepper MD.      Follow-up Information     Follow up with Kavon Morales DO.    Specialty:  Family Practice    Why:  next week, may need to have EGD and or small bowel follow through     Contact information:    [facility]  [address] [facility] [id number] [phone]          Discharge Instructions       What to expect when you have contrast    During your exam, we will inject  contrast  into your vein or artery. (Contrast is a clear liquid with iodine in it. It shows up on X-rays.)    You may feel warm or hot. You may have a metal taste in your mouth and a slight upset stomach. You may also feel pressure near the kidneys and bladder. These effects will last about 1 to 3 minutes.    Please tell us if you have:    Sneezing     Itching    Hives     Swelling in the face    A hoarse voice    Breathing problems    Other new symptoms    Serious problems are rare.  They may include:    Irregular heartbeat     Seizures    Kidney failure              Tissue damage    Shock      Death    If you have any problems during the exam, we  will treat them right away.    When you get home    Call your hospital if you have any new symptoms in the next 2 days, like hives or swelling. (Phone numbers are at the bottom of this page.) Or call your family doctor.     If you have wheezing or trouble breathing, call 911.    Self-care  -Drink at least 4 extra glasses of water today.   This reduces the stress on your kidneys.  -Keep taking your regular medicines.    The contrast will pass  out of your body in your  Urine(pee). This will happen in the next 24 hours. You  will not feel this. Your urine will not  change color.    If you have kidney problems or take metformin    Drink 4 to 8 large glasses of water for the next  2 days, if you are not on a fluid restriction.    ?If you take metformin (Glucophage or Glucovance) for diabetes, keep taking it.      ?Your kidney function tests are abnormal.  If you take Metformin, do not take it for 48 hours. Please go to your clinic for a blood test within 3 days after your exam before the restarting this medicine.     (Note to provider:please give patient prescription for lab tests.)    ?Special instructions: -Drink at least 4 extra glasses of water today.   This reduces the stress on your kidneys.    I have read and understand the above information.    Patient Sign Here:______________________________________Date:________Time:______    Staff Sign Here:________________________________________Date:_______Time:______      Radiology Departments:     ?[facility]: [phone] ?[place]: [phone]     ?[place]: [phone] ?[facility]:[phone]      ?[facility]: [phone]  ?[facility]: [phone]  ?[facility]:[phone]    ?[facility]:[phone]  ?[facility]:[phone]       Review of your medicines      START taking        Dose / Directions Last dose taken    * dicyclomine 10 MG capsule   Commonly known as:  BENTYL   Dose:  10 mg   Quantity:  15 capsule        Take 1 capsule (10 mg) by mouth 4 times daily (before meals and nightly)   Refills:  0        * dicyclomine 10 MG capsule   Commonly known as:  BENTYL   Dose:  10 mg   Quantity:  16 capsule        Take 1 capsule (10 mg) by mouth 4 times daily (before meals and nightly)   Refills:  0        * Notice:  This list has 2 medication(s) that are the same as other medications prescribed for you. Read the directions carefully, and ask your doctor or other care provider to review them with you.       Our records show that you are taking the medicines listed below. If these are incorrect, please call your family doctor or clinic.        Dose / Directions Last dose taken    albuterol 108 (90 BASE) MCG/ACT Inhaler   Commonly known as:  PROAIR HFA   Dose:  2 puff   Quantity:  1 Inhaler        Inhale 2 puffs into the lungs 4 times daily And PRN   Refills:  0        CELEXA PO   Dose:  40 mg        Take 40 mg by mouth daily.   Refills:  0        HYDROcodone-acetaminophen 5-325 MG per tablet   Commonly known as:  NORCO   Dose:  1 tablet        Take 1 tablet by mouth every 6 hours as needed.   Refills:  0        ibuprofen 200 MG tablet   Commonly known as:  ADVIL/MOTRIN   Dose:  600 mg   Quantity:  60 tablet        Take 3 tablets (600 mg) by mouth every 6 hours as needed for pain   Refills:  0        LANsoprazole 30 MG CR capsule   Commonly known as:  PREVACID   Dose:  30 mg   Quantity:  30 capsule        Take 1 capsule (30 mg) by mouth 2 times daily For 2 weeks, then daily.   Refills:  0        WELLBUTRIN PO   Dose:  150 mg        Take 150 mg by mouth daily   Refills:  0                Prescriptions were sent or printed at these locations (2 Prescriptions)                   [id number] - [place] - [address]   [address] [place] [address]    Telephone:  [phone]   Fax:  [phone]   Hours:                  E-Prescribed (2 of 2)         dicyclomine (BENTYL) 10 MG capsule               dicyclomine (BENTYL) 10 MG capsule                Procedures and tests performed during your visit     CBC with platelets differential    CRP inflammation    CT Abdomen Pelvis w Contrast    Comprehensive metabolic panel    Lipase    UA with Microscopic reflex to Culture    XR Abdomen 2 Views      Orders Needing Specimen Collection     None      Pending Results     Date and Time Order Name Status Description    9/23/2017 1832 CT Abdomen Pelvis w Contrast In process     9/23/2017 1727 XR Abdomen 2 Views  "In process             Pending Culture Results     No orders found from 2017 to 2017.            Thank you for choosing [facility]       Thank you for choosing [facility] for your care. Our goal is always to provide you with excellent care. Hearing back from our patients is one way we can continue to improve our services. Please take a few minutes to complete the written survey that you may receive in the mail after you visit with us. Thank you!        Exchang[employer] Information     AppSheet lets you send messages to your doctor, view your test results, renew your prescriptions, schedule appointments and more. To sign up, go to [email or web address] . Click on \"Log in\" on the left side of the screen, which will take you to the Welcome page. Then click on \"Sign up Now\" on the right side of the page.     You will be asked to enter the access code listed below, as well as some personal information. Please follow the directions to create your username and password.     Your access code is: [email or web address]  Expires: 2017  7:50 PM     Your access code will  in 90 days. If you need help or a new code, please call your [facility] clinic or [phone].        Care EveryWhere ID     This is your Care EveryWhere ID. This could be used by other organizations to access your [facility] medical records  [id number]        Equal Access to Services     ARIC BURTON AH: Virginia Jauregui, waaxda luqadaha, qaybta kaalmada suzi, dee london. So [facility] [phone].    ATENCIÓN: Si habla español, tiene a price disposición servicios gratuitos de asistencia lingüística. Llame al [phone].    We comply with applicable federal civil rights laws and [place] laws. We do not discriminate on the basis of race, color, national origin, age, disability sex, sexual orientation or gender identity.            After Visit Summary       This is your record. Keep this with you and show to your " community pharmacist(s) and doctor(s) at your next visit.

## 2017-09-23 NOTE — ED AVS SNAPSHOT
[facility]    [address]    [place] [address]    Phone:  [phone]                                       Debra Park   MRN: [id number]    Department:  [facility]   Date of Visit:  9/23/2017           After Visit Summary Signature Page     I have received my discharge instructions, and my questions have been answered. I have discussed any challenges I see with this plan with the nurse or doctor.    ..........................................................................................................................................  Patient/Patient Representative Signature      ..........................................................................................................................................  Patient Representative Print Name and Relationship to Patient    ..................................................               ................................................  Date                                            Time    ..........................................................................................................................................  Reviewed by Signature/Title    ...................................................              ..............................................  Date                                                            Time

## 2017-09-24 NOTE — ED NOTES
Face to face report given with opportunity to observe patient.    Report given to BRODIE Zurita   9/23/2017  7:03 PM

## 2017-09-25 NOTE — PROGRESS NOTES
CT abdomen and pelvis report routed to Dr Morales. IMPRESSION: PROBABLE 2 CM IN DIAMETER FOLLICLE, LEFT OVARY. Pt seen in ED 9/23/17 and advised at that time to follow up with Dr Morales this week of 9/25/17.

## 2018-02-12 ENCOUNTER — HOSPITAL ENCOUNTER (OUTPATIENT)
Dept: PHYSICAL THERAPY | Facility: HOSPITAL | Age: 53
Setting detail: THERAPIES SERIES
End: 2018-02-12
Attending: FAMILY MEDICINE
Payer: COMMERCIAL

## 2018-02-12 PROCEDURE — 97033 APP MDLTY 1+IONTPHRSIS EA 15: CPT | Mod: GP | Performed by: PHYSICAL THERAPIST

## 2018-02-12 PROCEDURE — 97161 PT EVAL LOW COMPLEX 20 MIN: CPT | Mod: GP | Performed by: PHYSICAL THERAPIST

## 2018-02-12 PROCEDURE — 40000718 ZZHC STATISTIC PT DEPARTMENT ORTHO VISIT: Performed by: PHYSICAL THERAPIST

## 2018-02-12 PROCEDURE — 97035 APP MDLTY 1+ULTRASOUND EA 15: CPT | Mod: GP | Performed by: PHYSICAL THERAPIST

## 2018-02-12 PROCEDURE — 97110 THERAPEUTIC EXERCISES: CPT | Mod: GP | Performed by: PHYSICAL THERAPIST

## 2018-02-13 NOTE — PROGRESS NOTES
02/12/18 0600   General Information   Type of Visit Initial OP Ortho PT Evaluation   Start of Care Date 02/12/18   Referring Physician Dr. Morales   Patient/Family Goals Statement decrease pain to tolerate work   Orders Evaluate and Treat   Date of Order 02/01/18   Insurance Type Other   Insurance Comments/Visits Authorized [employer] One no limits   Medical Diagnosis Right lateral epicondylitis   Surgical/Medical history reviewed Yes   Body Part(s)   Body Part(s) Elbow/Wrist   Presentation and Etiology   Pertinent history of current problem (include personal factors and/or comorbidities that impact the POC) Pt reports that her elbow down to fingers will hurt and fingers will become tingling.  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  Pt reports it has been going on for approximately 8 months and states it did get a little better but still present.  Pt states she tries to work on stretches given by Dr. Morales but admits she really only does them when it bothers her.  Pt states when it is bothering her it will keep her from falling asleep and can stay fairly intense for 2-3 days.  Pt taking hydrocodone and ibuprofen and naproxen.  Pt does report she was given a brace to wear but admits she hasn't really been wearing it.   Impairments A. Pain;D. Decreased ROM;E. Decreased flexibility;F. Decreased strength and endurance;L. Tingling;K. Numbness   Functional Limitations perform activities of daily living;perform required work activities;perform desired leisure / sports activities   Symptom Location right hand/wrist/elbow   How/Where did it occur With repetition/overuse   Chronicity New   Pain rating (0-10 point scale) Best (/10);Worst (/10)   Best (/10) 3   Worst (/10) 10   Pain quality C. Aching;D.  Burning   Frequency of pain/symptoms A. Constant   Pain/symptoms are: Worse in the morning   Pain/symptoms exacerbated by D. Carrying;K. Home tasks;L. Work tasks;M. Other   Pain exacerbation comment opening bottles   Pain/symptoms eased by C. Rest;I. OTC medication(s)   Progression of symptoms since onset: Improved   Prior Level of Function   Prior Level of Function-Mobility independent   Prior Level of Function-ADLs independent   Current Level of Function   Patient role/employment history A. Employed   [facility]   Home/community accessibility stairs   Fall Risk Screen   Fall screen completed by PT   Have you fallen 2 or more times in the past year? No   Have you fallen and had an injury in the past year? No   Is patient a fall risk? No   Elbow/Wrist Objective Findings   Side (if bilateral, select both right and left) Right   Observation no acute distress   Cervical Screen ROM WNL   Cervical Screen Spurling's Test negative   Lateral Epicondylitis Test positive   Medial Epicondylitis Test positive   Varus Stress Test negative   Valgus Stress Test negative   Palpation significant tenderness through extensor muscle bellies, flexor muscle bellies as well as up into distal biceps.  Tenderness noted over lateral epicondyle on R.   Right Elbow Flexion/Extension AROM WNL and equal to L however significant tissue stretch reported on R from patient   Right Wrist Flexion AROM WNL and equal to L however significant tissue stretch reported on R   Right Wrist Extension AROM WNL and equal to L however tissue stretch reported on R from patient   Right Wrist Supination AROM WNL   Right Wrist Pronation AROM WNL   Right  Strength (lbs) 45 with pain   Planned Therapy Interventions   Planned Therapy Interventions joint mobilization;manual therapy;ROM;strengthening;stretching   Planned Modality Interventions   Planned Modality Interventions Iontophoresis;Ultrasound;Cryotherapy  (phonophoresis with  hydrocortizone, ionto with dexamethasone)   Clinical Impression   Criteria for Skilled Therapeutic Interventions Met yes, treatment indicated   PT Diagnosis pain with ADLS and work duties due to R lateral epicondylitis   Influenced by the following impairments pain, decreased strength, muscle tension,    Functional limitations due to impairments pain with ADLs, weakness limiting ability to carry out ADLs and work duties   Clinical Presentation Stable/Uncomplicated   Clinical Presentation Rationale uncomplicated with symptoms improving somewhat over past few weeks   Clinical Decision Making (Complexity) Low complexity   Therapy Frequency 2 times/Week   Predicted Duration of Therapy Intervention (days/wks) 8 weeks   Risk & Benefits of therapy have been explained Yes   Patient, Family & other staff in agreement with plan of care Yes   Clinical Impression Comments Pt presents with R elbow pain especially after nights working as  with repeated opening of bottles.  Pt demonstrates tenderness and weakness through extensors as well as flexors.     Education Assessment   Preferred Learning Style Listening;Reading   Barriers to Learning No barriers   ORTHO GOALS   PT Ortho Eval Goals 1;2;3   Ortho Goal 1   Goal Identifier STG 1   Goal Description Pt to be independent and compliant with HEP   Target Date 02/26/18   Ortho Goal 2   Goal Identifier LTG 1   Goal Description Pt to complete 5 hour shift at work with pain <2/10 at end of shift   Target Date 04/09/18   Ortho Goal 3   Goal Identifier LTG 2   Goal Description Pt to improve R UE  strength by at least 5 lbs to complete bar tending tasks without difficulty.   Target Date 04/09/10   Total Evaluation Time   Total Evaluation Time 21

## 2018-02-20 ENCOUNTER — HOSPITAL ENCOUNTER (OUTPATIENT)
Dept: PHYSICAL THERAPY | Facility: HOSPITAL | Age: 53
Setting detail: THERAPIES SERIES
End: 2018-02-20
Attending: FAMILY MEDICINE
Payer: COMMERCIAL

## 2018-02-20 PROCEDURE — 97035 APP MDLTY 1+ULTRASOUND EA 15: CPT | Mod: GP

## 2018-02-20 PROCEDURE — 40000718 ZZHC STATISTIC PT DEPARTMENT ORTHO VISIT

## 2018-02-20 PROCEDURE — 97033 APP MDLTY 1+IONTPHRSIS EA 15: CPT | Mod: GP

## 2018-02-23 ENCOUNTER — HOSPITAL ENCOUNTER (OUTPATIENT)
Dept: PHYSICAL THERAPY | Facility: HOSPITAL | Age: 53
Setting detail: THERAPIES SERIES
End: 2018-02-23
Attending: FAMILY MEDICINE
Payer: COMMERCIAL

## 2018-02-23 PROCEDURE — 40000718 ZZHC STATISTIC PT DEPARTMENT ORTHO VISIT: Performed by: PHYSICAL THERAPIST

## 2018-02-23 PROCEDURE — 97110 THERAPEUTIC EXERCISES: CPT | Mod: GP | Performed by: PHYSICAL THERAPIST

## 2018-02-23 PROCEDURE — 97140 MANUAL THERAPY 1/> REGIONS: CPT | Mod: GP | Performed by: PHYSICAL THERAPIST

## 2018-02-27 ENCOUNTER — HOSPITAL ENCOUNTER (OUTPATIENT)
Dept: PHYSICAL THERAPY | Facility: HOSPITAL | Age: 53
Setting detail: THERAPIES SERIES
End: 2018-02-27
Attending: FAMILY MEDICINE
Payer: COMMERCIAL

## 2018-02-27 PROCEDURE — 97035 APP MDLTY 1+ULTRASOUND EA 15: CPT | Mod: GP

## 2018-02-27 PROCEDURE — 97140 MANUAL THERAPY 1/> REGIONS: CPT | Mod: GP

## 2018-02-27 PROCEDURE — 40000718 ZZHC STATISTIC PT DEPARTMENT ORTHO VISIT

## 2018-03-02 ENCOUNTER — HOSPITAL ENCOUNTER (OUTPATIENT)
Dept: PHYSICAL THERAPY | Facility: HOSPITAL | Age: 53
Setting detail: THERAPIES SERIES
End: 2018-03-02
Attending: FAMILY MEDICINE
Payer: COMMERCIAL

## 2018-03-02 PROCEDURE — 97033 APP MDLTY 1+IONTPHRSIS EA 15: CPT | Mod: GP | Performed by: PHYSICAL THERAPIST

## 2018-03-02 PROCEDURE — 97110 THERAPEUTIC EXERCISES: CPT | Mod: GP | Performed by: PHYSICAL THERAPIST

## 2018-03-02 PROCEDURE — 40000718 ZZHC STATISTIC PT DEPARTMENT ORTHO VISIT: Performed by: PHYSICAL THERAPIST

## 2018-03-02 PROCEDURE — 97140 MANUAL THERAPY 1/> REGIONS: CPT | Mod: GP | Performed by: PHYSICAL THERAPIST

## 2018-03-06 ENCOUNTER — HOSPITAL ENCOUNTER (OUTPATIENT)
Dept: PHYSICAL THERAPY | Facility: HOSPITAL | Age: 53
Setting detail: THERAPIES SERIES
End: 2018-03-06
Attending: FAMILY MEDICINE
Payer: COMMERCIAL

## 2018-03-06 PROCEDURE — 97033 APP MDLTY 1+IONTPHRSIS EA 15: CPT | Mod: GP

## 2018-03-06 PROCEDURE — 40000718 ZZHC STATISTIC PT DEPARTMENT ORTHO VISIT

## 2018-03-06 PROCEDURE — 97110 THERAPEUTIC EXERCISES: CPT | Mod: GP

## 2018-03-09 ENCOUNTER — HOSPITAL ENCOUNTER (OUTPATIENT)
Dept: PHYSICAL THERAPY | Facility: HOSPITAL | Age: 53
Setting detail: THERAPIES SERIES
End: 2018-03-09
Attending: FAMILY MEDICINE
Payer: COMMERCIAL

## 2018-03-09 PROCEDURE — 40000718 ZZHC STATISTIC PT DEPARTMENT ORTHO VISIT

## 2018-03-09 PROCEDURE — 97033 APP MDLTY 1+IONTPHRSIS EA 15: CPT | Mod: GP

## 2018-03-09 PROCEDURE — 97110 THERAPEUTIC EXERCISES: CPT | Mod: GP

## 2018-03-21 ENCOUNTER — HOSPITAL ENCOUNTER (OUTPATIENT)
Dept: CT IMAGING | Facility: HOSPITAL | Age: 53
Discharge: HOME | End: 2018-03-21
Attending: FAMILY MEDICINE | Admitting: FAMILY MEDICINE
Payer: COMMERCIAL

## 2018-03-21 DIAGNOSIS — J32.9 CHRONIC SINUSITIS, UNSPECIFIED LOCATION: ICD-10-CM

## 2018-03-21 PROCEDURE — 70486 CT MAXILLOFACIAL W/O DYE: CPT | Mod: TC

## 2018-03-27 ENCOUNTER — HOSPITAL ENCOUNTER (OUTPATIENT)
Dept: PHYSICAL THERAPY | Facility: HOSPITAL | Age: 53
Setting detail: THERAPIES SERIES
End: 2018-03-27
Attending: FAMILY MEDICINE
Payer: COMMERCIAL

## 2018-03-27 PROCEDURE — 97110 THERAPEUTIC EXERCISES: CPT | Mod: GP

## 2018-03-27 PROCEDURE — 97033 APP MDLTY 1+IONTPHRSIS EA 15: CPT | Mod: GP

## 2018-03-27 PROCEDURE — 40000718 ZZHC STATISTIC PT DEPARTMENT ORTHO VISIT

## 2018-09-18 NOTE — PROGRESS NOTES
Outpatient Physical Therapy Discharge Note     Patient: Debra Park  : 1965    Beginning/End Dates of Reporting Period:  2018 to 2018    Referring Provider: Dr. Morales    Therapy Diagnosis: pain with ADLs and work duties due to R lateral epicondylitis     Client Self Report: On last attended session: Pt states elbow is feeling better but having shoulder pain from shoveling.    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   Target Date 04/09/10   Date Met      Progress:     Goal Identifier     Goal Description     Target Date     Date Met      Progress:     Goal Identifier     Goal Description     Target Date     Date Met      Progress:     Goal Identifier     Goal Description     Target Date     Date Met      Progress:     Goal Identifier     Goal Description     Target Date     Date Met      Progress:     Goal Identifier     Goal Description     Target Date     Date Met      Progress:     Progress Toward Goals:   Not assessed this period.  Pt reported improvements in elbow and shoulder pain but failed to attend final PT session for formal assessment.          Plan:  Discharge from therapy.    Discharge:    Reason for Discharge: Patient has failed to schedule further appointments.    Equipment Issued:     Discharge Plan: Patient to continue home program.

## 2018-10-25 ENCOUNTER — HOSPITAL ENCOUNTER (EMERGENCY)
Facility: HOSPITAL | Age: 53
Discharge: HOME | End: 2018-10-25
Attending: FAMILY MEDICINE | Admitting: FAMILY MEDICINE
Payer: COMMERCIAL

## 2018-10-25 ENCOUNTER — APPOINTMENT (OUTPATIENT)
Dept: CT IMAGING | Facility: HOSPITAL | Age: 53
End: 2018-10-25
Attending: FAMILY MEDICINE
Payer: COMMERCIAL

## 2018-10-25 VITALS
HEART RATE: 76 BPM | RESPIRATION RATE: 16 BRPM | TEMPERATURE: 98 F | SYSTOLIC BLOOD PRESSURE: 119 MMHG | OXYGEN SATURATION: 97 % | DIASTOLIC BLOOD PRESSURE: 105 MMHG

## 2018-10-25 DIAGNOSIS — H83.02 LABYRINTHITIS OF LEFT EAR: ICD-10-CM

## 2018-10-25 DIAGNOSIS — J32.0 CHRONIC MAXILLARY SINUSITIS: ICD-10-CM

## 2018-10-25 DIAGNOSIS — M47.22 CERVICAL RADICULOPATHY DUE TO DEGENERATIVE JOINT DISEASE OF SPINE: ICD-10-CM

## 2018-10-25 LAB
ANION GAP SERPL CALCULATED.3IONS-SCNC: 6 MMOL/L (ref 3–14)
BASOPHILS # BLD AUTO: 0.1 10E9/L (ref 0–0.2)
BASOPHILS NFR BLD AUTO: 0.8 %
BUN SERPL-MCNC: 17 MG/DL (ref 7–30)
CALCIUM SERPL-MCNC: 9.1 MG/DL (ref 8.5–10.1)
CHLORIDE SERPL-SCNC: 105 MMOL/L (ref 94–109)
CO2 SERPL-SCNC: 27 MMOL/L (ref 20–32)
CREAT SERPL-MCNC: 1.04 MG/DL (ref 0.52–1.04)
DIFFERENTIAL METHOD BLD: NORMAL
EOSINOPHIL # BLD AUTO: 0.3 10E9/L (ref 0–0.7)
EOSINOPHIL NFR BLD AUTO: 3.6 %
ERYTHROCYTE [DISTWIDTH] IN BLOOD BY AUTOMATED COUNT: 12 % (ref 10–15)
ERYTHROCYTE [SEDIMENTATION RATE] IN BLOOD BY WESTERGREN METHOD: 7 MM/H (ref 0–30)
GFR SERPL CREATININE-BSD FRML MDRD: 56 ML/MIN/1.7M2
GLUCOSE SERPL-MCNC: 100 MG/DL (ref 70–99)
HCT VFR BLD AUTO: 46 % (ref 35–47)
HGB BLD-MCNC: 15.7 G/DL (ref 11.7–15.7)
IMM GRANULOCYTES # BLD: 0 10E9/L (ref 0–0.4)
IMM GRANULOCYTES NFR BLD: 0.3 %
LYMPHOCYTES # BLD AUTO: 1.8 10E9/L (ref 0.8–5.3)
LYMPHOCYTES NFR BLD AUTO: 20.7 %
MAGNESIUM SERPL-MCNC: 2.4 MG/DL (ref 1.6–2.3)
MCH RBC QN AUTO: 32 PG (ref 26.5–33)
MCHC RBC AUTO-ENTMCNC: 34.1 G/DL (ref 31.5–36.5)
MCV RBC AUTO: 94 FL (ref 78–100)
MONOCYTES # BLD AUTO: 0.8 10E9/L (ref 0–1.3)
MONOCYTES NFR BLD AUTO: 9.1 %
NEUTROPHILS # BLD AUTO: 5.8 10E9/L (ref 1.6–8.3)
NEUTROPHILS NFR BLD AUTO: 65.5 %
NRBC # BLD AUTO: 0 10*3/UL
NRBC BLD AUTO-RTO: 0 /100
PLATELET # BLD AUTO: 278 10E9/L (ref 150–450)
POTASSIUM SERPL-SCNC: 4.3 MMOL/L (ref 3.4–5.3)
RBC # BLD AUTO: 4.9 10E12/L (ref 3.8–5.2)
SODIUM SERPL-SCNC: 138 MMOL/L (ref 133–144)
TSH SERPL DL<=0.005 MIU/L-ACNC: 0.57 MU/L (ref 0.4–4)
WBC # BLD AUTO: 8.9 10E9/L (ref 4–11)

## 2018-10-25 PROCEDURE — 84443 ASSAY THYROID STIM HORMONE: CPT | Performed by: FAMILY MEDICINE

## 2018-10-25 PROCEDURE — 80048 BASIC METABOLIC PNL TOTAL CA: CPT | Performed by: FAMILY MEDICINE

## 2018-10-25 PROCEDURE — 25000128 H RX IP 250 OP 636: Performed by: RADIOLOGY

## 2018-10-25 PROCEDURE — 36415 COLL VENOUS BLD VENIPUNCTURE: CPT | Performed by: FAMILY MEDICINE

## 2018-10-25 PROCEDURE — 70496 CT ANGIOGRAPHY HEAD: CPT | Mod: TC

## 2018-10-25 PROCEDURE — 99285 EMERGENCY DEPT VISIT HI MDM: CPT | Mod: 25

## 2018-10-25 PROCEDURE — 85652 RBC SED RATE AUTOMATED: CPT | Performed by: FAMILY MEDICINE

## 2018-10-25 PROCEDURE — 83735 ASSAY OF MAGNESIUM: CPT | Performed by: FAMILY MEDICINE

## 2018-10-25 PROCEDURE — 99284 EMERGENCY DEPT VISIT MOD MDM: CPT | Mod: Z6 | Performed by: FAMILY MEDICINE

## 2018-10-25 PROCEDURE — 25000132 ZZH RX MED GY IP 250 OP 250 PS 637: Performed by: FAMILY MEDICINE

## 2018-10-25 PROCEDURE — 85025 COMPLETE CBC W/AUTO DIFF WBC: CPT | Performed by: FAMILY MEDICINE

## 2018-10-25 PROCEDURE — 72125 CT NECK SPINE W/O DYE: CPT | Mod: TC

## 2018-10-25 PROCEDURE — 70450 CT HEAD/BRAIN W/O DYE: CPT | Mod: TC

## 2018-10-25 RX ORDER — MECLIZINE HYDROCHLORIDE 25 MG/1
25 TABLET ORAL ONCE
Status: COMPLETED | OUTPATIENT
Start: 2018-10-25 | End: 2018-10-25

## 2018-10-25 RX ORDER — IOPAMIDOL 755 MG/ML
50 INJECTION, SOLUTION INTRAVASCULAR ONCE
Status: COMPLETED | OUTPATIENT
Start: 2018-10-25 | End: 2018-10-25

## 2018-10-25 RX ORDER — MECLIZINE HCL 12.5 MG 12.5 MG/1
12.5 TABLET ORAL 4 TIMES DAILY PRN
Qty: 30 TABLET | Refills: 0 | Status: SHIPPED | OUTPATIENT
Start: 2018-10-25 | End: 2019-07-17

## 2018-10-25 RX ORDER — ACETAMINOPHEN 325 MG/1
1000 TABLET ORAL ONCE
Status: COMPLETED | OUTPATIENT
Start: 2018-10-25 | End: 2018-10-25

## 2018-10-25 RX ORDER — FLUTICASONE PROPIONATE 50 MCG
1 SPRAY, SUSPENSION (ML) NASAL DAILY
COMMUNITY
End: 2019-07-17

## 2018-10-25 RX ADMIN — IOPAMIDOL 50 ML: 755 INJECTION, SOLUTION INTRAVENOUS at 12:20

## 2018-10-25 RX ADMIN — ACETAMINOPHEN 975 MG: 325 TABLET, FILM COATED ORAL at 14:12

## 2018-10-25 RX ADMIN — MECLIZINE HYDROCHLORIDE 25 MG: 25 TABLET ORAL at 11:12

## 2018-10-25 NOTE — ED AVS SNAPSHOT
[facility]    [address]    [place] [address]    Phone:  [phone]                                       Debra Park   MRN: [id number]    Department:  [facility]   Date of Visit:  10/25/2018           After Visit Summary Signature Page     I have received my discharge instructions, and my questions have been answered. I have discussed any challenges I see with this plan with the nurse or doctor.    ..........................................................................................................................................  Patient/Patient Representative Signature      ..........................................................................................................................................  Patient Representative Print Name and Relationship to Patient    ..................................................               ................................................  Date                                   Time    ..........................................................................................................................................  Reviewed by Signature/Title    ...................................................              ..............................................  Date                                               Time          22EPIC Rev 08/18

## 2018-10-25 NOTE — DISCHARGE INSTRUCTIONS
General Neck and Back Pain    Both neck and back pain are usually caused by injury to the muscles or ligaments of the spine. Sometimes the disks that separate each bone of the spine may cause pain by pressing on a nearby nerve. Back and neck pain may appear after a sudden twisting or bending force (such as in a car accident), or sometimes after a simple awkward movement. In either case, muscle spasm is often present and adds to the pain.  Acute neck and back pain usually gets better in 1 to 2 weeks. Pain related to disk disease, arthritis in the spinal joints or spinal stenosis (narrowing of the spinal canal) can become chronic and last for months or years.  Back and neck pain are common problems. Most people feel better in 1 or 2 weeks, and most of the rest in 1 to 2 months. Most people can remain active.  People have and describe pain differently.    Pain can be sharp, stabbing, shooting, aching, cramping, or burning    Movement, standing, bending, lifting, sitting, or walking may worsen the pain    Pain can be localized to one spot or area, or it can be more generalized    Pain can spread or radiate upwards, downwards, to the front, or go down your arms    Muscle spasm may occur.  Most of the time mechanical problems with the muscles or spine cause the pain. it is usually caused by an injury, whether known or not, to the muscles or ligaments. While illnesses can cause back pain, it is usually not caused by a serious illness. Pain is usually related to physical activity, whether sports, exercise, work, or normal activity. Sometimes it can occur without an identifiable cause. This can happen simply by stretching or moving wrong, without noting pain at the time. Other causes include:    Overexertion, lifting, pushing, pulling incorrectly or too aggressively.    Sudden twisting, bending or stretching from an accident (car or fall), or accidental movement.    Poor posture    Poor conditioning, lack of regular  exercise    Spinal disc disease or arthritis    Stress    Pregnancy, or illness like appendicitis, bladder or kidney infection, pelvic infections   Home care    For neck pain: Use a comfortable pillow that supports the head and keeps the spine in a neutral position. The position of the head should not be tilted forward or backward.    When in bed, try to find a position of comfort. A firm mattress is best. Try lying flat on your back with pillows under your knees. You can also try lying on your side with your knees bent up towards your chest and a pillow between your knees.    At first, do not try to stretch out the sore spots. If there is a strain, it is not like the good soreness you get after exercising without an injury. In this case, stretching may make it worse.    Don't sit for long periods, as in long car rides or other travel. This puts more stress on the lower back than standing or walking.    During the first 24 to 72 hours after an injury, apply an ice pack to the painful area for 20 minutes and then remove it for 20 minutes over a period of 60 to 90 minutes or several times a day.     You can alternate ice and heat therapies. Talk with your healthcare provider about the best treatment for your back or neck pain. As a safety precaution, do not use a heating pad at bedtime. Sleeping with a heating pad can lead to skin burns or tissue damage.    Therapeutic massage can help relax the back and neck muscles without stretching them.    Be aware of safe lifting methods and do not lift anything over 15 pounds until all the pain is gone.  Medicines  Talk to your healthcare provider before using medicine, especially if you have other medical problems or are taking other medicines.    You may use over-the-counter medicine to control pain, unless another pain medicine was prescribed. If you have chronic conditions like diabetes, liver or kidney disease, stomach ulcers,  gastrointestinal bleeding, or are taking  blood thinner medicines.    Be careful if you are given pain medicines, narcotics, or medicine for muscle spasm. They can cause drowsiness, and can affect your coordination, reflexes, and judgment. Do not drive or operate heavy machinery.  Follow-up care  Follow up with your healthcare provider, or as advised. Physical therapy or further tests may be needed.  If X-rays were taken, you will be notified of any new findings that may affect your care.  Call 911  Call 911 if any of the following occur:    Trouble breathing    Confusion    Very drowsy or trouble awakening    Fainting or loss of consciousness    Rapid or very slow heart rate    Loss of bowel or bladder control  When to seek medical advice  Call your healthcare provider right away if any of these occur:    Pain becomes worse or spreads into your arms or legs    Weakness, numbness or pain in one or both arms or legs    Numbness in the groin area    Difficulty walking    Fever of 100.4 F (38 C) or higher, or as directed by your healthcare provider  Date Last Reviewed: 7/1/2016 2000-2017 [employer]. [address] [place] [address]. All rights reserved. This information is not intended as a substitute for professional medical care. Always follow your healthcare professional's instructions.          Nonsurgical Treatment of Cervical Spine Problems  Cervical spine problems can often be treated without surgery. Choices include rest, medicines, or injections. Your healthcare provider may also suggest physical therapy or certain exercises. These may all help to relieve your symptoms. These treatments are often successful. But if your symptoms don t subside, talk to your healthcare provider. He or she may tell you that surgery is your best choice.  Relieving your symptoms  Your healthcare provider may recommend:    Medicines. These help to reduce pain and inflammation.    Epidural steroid injections. These are injections into the spinal  canal near the spinal cord. They may relieve severe pain and reduce inflammation.    Restricting aggravating activities. This can help to give your cervical spine time to heal.    A soft cervical collar. This can help to support your head while keeping your cervical spine aligned.    Traction. This may help relieve pressure on the irritated nerves.  Restoring mobility and strength  Your healthcare provider may recommend that you work with a physical therapist. This can help you regain mobility and strength in your neck. Physical therapy may last for 4 to 8 weeks. It may include:    Exercises to improve your neck s range of motion and strength.    Evaluation and correction of posture and body movements. This can correct problems that affect your cervical spine.    Heat, massage, and traction to help relieve your symptoms.  Self-care  You ll take an active role in your own therapy. To protect your neck from further injury:    Follow any exercise program given to you by your healthcare provider or physical therapist.    Practice good posture while sitting, standing, or moving.    Have your workspace evaluated. Rearrange it if needed.    When lying down, support your neck. You can use a special cervical pillow or rolled-up towel.   Date Last Reviewed: 10/5/2015    3106-2154 [employer]. [address] [place], [place] [id number]. All rights reserved. This information is not intended as a substitute for professional medical care. Always follow your healthcare professional's instructions.          Labyrinthitis    The inner ear is located behind the middle ear. It is part of the balance center of your body. When the inner ear becomes irritated or inflamed it causes a condition known as labyrinthitis. It may due to a viral infection, but often a cause is not found. Labyrinthitis causes sudden dizziness and balance problems. It often causes a feeling that you or the room is spinning (vertigo). You may feel  nauseated or throw up. You may also feel a loss of balance when trying to walk. Head movement from side to side or changes in body position (from lying to sitting or standing) may worsen symptoms. You may have ringing in the ear. Hearing may also be affected.  An episode of labyrinthitis may last seconds, minutes, or hours. It may never return. Or symptoms may recur off and on over several weeks or longer. In many cases, the problem is short-term and goes away when the inner ear issue resolves.  Home care    Take medicine as prescribed to relieve your symptoms. Unless another medicine was prescribed, you can try over-the-counter motion sickness pills. Note that these medicines may cause drowsiness.    If symptoms are severe, rest quietly in bed. Change positions slowly. There may be 1 position feels best, such as lying on 1 side or lying on your back with your head slightly raised on pillows. Until you have no symptoms, you are at a higher risk of falling. Let someone help you when you get up. Get rid of home hazards such as loose electrical cords and throw rugs. Don t walk in unfamiliar areas that are not lighted. Use night lights in bathrooms and kitchen areas.    Vestibular rehabilitation exercises are done by moving your head to help fix problems in the inner ear. If these exercises have been prescribed, do them as you have been instructed.    Do not drive or work with dangerous machinery until 1 week has passed without symptoms. Be careful when using stairs.  Follow-up care  Follow up with your healthcare provider or as advised by our staff.  When to seek medical advice  Call your healthcare provider for any of the following:    Symptoms that are not controlled by medicine     Symptoms that get worse    Repeated vomiting that is not relieved by medicine    Weakness that gets worse    Fainting    Headache or unusual drowsiness    Trouble with vision or speech    Trouble moving your face, arms, or legs    Hearing  loss    Symptoms that last more than a few days  Date Last Reviewed: 11/1/2017 2000-2017 [employer]. [address] [facility], [place] [address]. All rights reserved. This information is not intended as a substitute for professional medical care. Always follow your healthcare professional's instructions.

## 2018-10-25 NOTE — ED NOTES
Hand off report from BRODIE Mendez that the patient arrived and was given a neuro evaluation by the provider. 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 Reports history of sinus issues with congestion and cough for a few weeks. Negative FAST exam. Denies nausea. Patient ready for CT.

## 2018-10-25 NOTE — ED AVS SNAPSHOT
[facility]    [address] [place] [address]    Phone:  [phone]                                       Debra Park   MRN: [id number]    Department:  [facility]   Date of Visit:  10/25/2018           Patient Information     Date Of Birth          1965        Your diagnoses for this visit were:     Labyrinthitis of left ear     Chronic maxillary sinusitis     Cervical radiculopathy due to degenerative joint disease of spine        You were seen by Jamia Pepper MD.      Follow-up Information     Schedule an appointment as soon as possible for a visit with Kavon Morales DO.    Specialty:  Family Practice    Contact information:    [facility]  [address] [facility] [address]  [phone]          Discharge Instructions         General Neck and Back Pain    Both neck and back pain are usually caused by injury to the muscles or ligaments of the spine. Sometimes the disks that separate each bone of the spine may cause pain by pressing on a nearby nerve. Back and neck pain may appear after a sudden twisting or bending force (such as in a car accident), or sometimes after a simple awkward movement. In either case, muscle spasm is often present and adds to the pain.  Acute neck and back pain usually gets better in 1 to 2 weeks. Pain related to disk disease, arthritis in the spinal joints or spinal stenosis (narrowing of the spinal canal) can become chronic and last for months or years.  Back and neck pain are common problems. Most people feel better in 1 or 2 weeks, and most of the rest in 1 to 2 months. Most people can remain active.  People have and describe pain differently.    Pain can be sharp, stabbing, shooting, aching, cramping, or burning    Movement, standing, bending, lifting, sitting, or walking may worsen the pain    Pain can be localized to one spot or area, or it can be more generalized    Pain can spread or  radiate upwards, downwards, to the front, or go down your arms    Muscle spasm may occur.  Most of the time mechanical problems with the muscles or spine cause the pain. it is usually caused by an injury, whether known or not, to the muscles or ligaments. While illnesses can cause back pain, it is usually not caused by a serious illness. Pain is usually related to physical activity, whether sports, exercise, work, or normal activity. Sometimes it can occur without an identifiable cause. This can happen simply by stretching or moving wrong, without noting pain at the time. Other causes include:    Overexertion, lifting, pushing, pulling incorrectly or too aggressively.    Sudden twisting, bending or stretching from an accident (car or fall), or accidental movement.    Poor posture    Poor conditioning, lack of regular exercise    Spinal disc disease or arthritis    Stress    Pregnancy, or illness like appendicitis, bladder or kidney infection, pelvic infections   Home care    For neck pain: Use a comfortable pillow that supports the head and keeps the spine in a neutral position. The position of the head should not be tilted forward or backward.    When in bed, try to find a position of comfort. A firm mattress is best. Try lying flat on your back with pillows under your knees. You can also try lying on your side with your knees bent up towards your chest and a pillow between your knees.    At first, do not try to stretch out the sore spots. If there is a strain, it is not like the good soreness you get after exercising without an injury. In this case, stretching may make it worse.    Don't sit for long periods, as in long car rides or other travel. This puts more stress on the lower back than standing or walking.    During the first 24 to 72 hours after an injury, apply an ice pack to the painful area for 20 minutes and then remove it for 20 minutes over a period of 60 to 90 minutes or several times a day.     You  can alternate ice and heat therapies. Talk with your healthcare provider about the best treatment for your back or neck pain. As a safety precaution, do not use a heating pad at bedtime. Sleeping with a heating pad can lead to skin burns or tissue damage.    Therapeutic massage can help relax the back and neck muscles without stretching them.    Be aware of safe lifting methods and do not lift anything over 15 pounds until all the pain is gone.  Medicines  Talk to your healthcare provider before using medicine, especially if you have other medical problems or are taking other medicines.    You may use over-the-counter medicine to control pain, unless another pain medicine was prescribed. If you have chronic conditions like diabetes, liver or kidney disease, stomach ulcers,  gastrointestinal bleeding, or are taking blood thinner medicines.    Be careful if you are given pain medicines, narcotics, or medicine for muscle spasm. They can cause drowsiness, and can affect your coordination, reflexes, and judgment. Do not drive or operate heavy machinery.  Follow-up care  Follow up with your healthcare provider, or as advised. Physical therapy or further tests may be needed.  If X-rays were taken, you will be notified of any new findings that may affect your care.  Call 911  Call 911 if any of the following occur:    Trouble breathing    Confusion    Very drowsy or trouble awakening    Fainting or loss of consciousness    Rapid or very slow heart rate    Loss of bowel or bladder control  When to seek medical advice  Call your healthcare provider right away if any of these occur:    Pain becomes worse or spreads into your arms or legs    Weakness, numbness or pain in one or both arms or legs    Numbness in the groin area    Difficulty walking    Fever of 100.4 F (38 C) or higher, or as directed by your healthcare provider  Date Last Reviewed: 7/1/2016 2000-2017 [employer]. [address] [facility]  [place] [address]. All rights reserved. This information is not intended as a substitute for professional medical care. Always follow your healthcare professional's instructions.          Nonsurgical Treatment of Cervical Spine Problems  Cervical spine problems can often be treated without surgery. Choices include rest, medicines, or injections. Your healthcare provider may also suggest physical therapy or certain exercises. These may all help to relieve your symptoms. These treatments are often successful. But if your symptoms don t subside, talk to your healthcare provider. He or she may tell you that surgery is your best choice.  Relieving your symptoms  Your healthcare provider may recommend:    Medicines. These help to reduce pain and inflammation.    Epidural steroid injections. These are injections into the spinal canal near the spinal cord. They may relieve severe pain and reduce inflammation.    Restricting aggravating activities. This can help to give your cervical spine time to heal.    A soft cervical collar. This can help to support your head while keeping your cervical spine aligned.    Traction. This may help relieve pressure on the irritated nerves.  Restoring mobility and strength  Your healthcare provider may recommend that you work with a physical therapist. This can help you regain mobility and strength in your neck. Physical therapy may last for 4 to 8 weeks. It may include:    Exercises to improve your neck s range of motion and strength.    Evaluation and correction of posture and body movements. This can correct problems that affect your cervical spine.    Heat, massage, and traction to help relieve your symptoms.  Self-care  You ll take an active role in your own therapy. To protect your neck from further injury:    Follow any exercise program given to you by your healthcare provider or physical therapist.    Practice good posture while sitting, standing, or moving.    Have your workspace evaluated.  Rearrange it if needed.    When lying down, support your neck. You can use a special cervical pillow or rolled-up towel.   Date Last Reviewed: 10/5/2015    4143-4356 [employer]. [address] [place] [address], [place] [address]. All rights reserved. This information is not intended as a substitute for professional medical care. Always follow your healthcare professional's instructions.          Labyrinthitis    The inner ear is located behind the middle ear. It is part of the balance center of your body. When the inner ear becomes irritated or inflamed it causes a condition known as labyrinthitis. It may due to a viral infection, but often a cause is not found. Labyrinthitis causes sudden dizziness and balance problems. It often causes a feeling that you or the room is spinning (vertigo). You may feel nauseated or throw up. You may also feel a loss of balance when trying to walk. Head movement from side to side or changes in body position (from lying to sitting or standing) may worsen symptoms. You may have ringing in the ear. Hearing may also be affected.  An episode of labyrinthitis may last seconds, minutes, or hours. It may never return. Or symptoms may recur off and on over several weeks or longer. In many cases, the problem is short-term and goes away when the inner ear issue resolves.  Home care    Take medicine as prescribed to relieve your symptoms. Unless another medicine was prescribed, you can try over-the-counter motion sickness pills. Note that these medicines may cause drowsiness.    If symptoms are severe, rest quietly in bed. Change positions slowly. There may be 1 position feels best, such as lying on 1 side or lying on your back with your head slightly raised on pillows. Until you have no symptoms, you are at a higher risk of falling. Let someone help you when you get up. Get rid of home hazards such as loose electrical cords and throw rugs. Don t walk in unfamiliar areas that are not  lighted. Use night lights in bathrooms and kitchen areas.    Vestibular rehabilitation exercises are done by moving your head to help fix problems in the inner ear. If these exercises have been prescribed, do them as you have been instructed.    Do not drive or work with dangerous machinery until 1 week has passed without symptoms. Be careful when using stairs.  Follow-up care  Follow up with your healthcare provider or as advised by our staff.  When to seek medical advice  Call your healthcare provider for any of the following:    Symptoms that are not controlled by medicine     Symptoms that get worse    Repeated vomiting that is not relieved by medicine    Weakness that gets worse    Fainting    Headache or unusual drowsiness    Trouble with vision or speech    Trouble moving your face, arms, or legs    Hearing loss    Symptoms that last more than a few days  Date Last Reviewed: 11/1/2017 2000-2017 [employer]. [address] [place] [address]. All rights reserved. This information is not intended as a substitute for professional medical care. Always follow your healthcare professional's instructions.             Review of your medicines      START taking        Dose / Directions Last dose taken    meclizine 12.5 MG tablet   Commonly known as:  ANTIVERT   Dose:  12.5 mg   Quantity:  30 tablet        Take 1 tablet (12.5 mg) by mouth 4 times daily as needed for dizziness   Refills:  0          Our records show that you are taking the medicines listed below. If these are incorrect, please call your family doctor or clinic.        Dose / Directions Last dose taken    albuterol 108 (90 Base) MCG/ACT inhaler   Commonly known as:  PROAIR HFA   Dose:  2 puff   Quantity:  1 Inhaler        Inhale 2 puffs into the lungs 4 times daily And PRN   Refills:  0        fluticasone 50 MCG/ACT spray   Commonly known as:  FLONASE   Dose:  1 spray        Spray 1 spray into both nostrils daily   Refills:  0     "    ibuprofen 200 MG tablet   Commonly known as:  ADVIL/MOTRIN   Dose:  600 mg   Quantity:  60 tablet        Take 3 tablets (600 mg) by mouth every 6 hours as needed for pain   Refills:  0        LANsoprazole 30 MG CR capsule   Commonly known as:  PREVACID   Dose:  30 mg   Quantity:  30 capsule        Take 1 capsule (30 mg) by mouth 2 times daily For 2 weeks, then daily.   Refills:  0                Prescriptions were sent or printed at these locations (1 Prescription)                   [id number] - [place] - [address]   [address] [place] [address]    Telephone:  [phone]   Fax:  [phone]   Hours:                  E-Prescribed (1 of 1)         meclizine (ANTIVERT) 12.5 MG tablet                Procedures and tests performed during your visit     Basic metabolic panel    CBC with platelets differential    CT Cervical Spine w/o Contrast    CT Head w/o Contrast    CTA Head Neck with Contrast    Erythrocyte sedimentation rate auto    Magnesium    TSH with free T4 reflex      Orders Needing Specimen Collection     None      Pending Results     No orders found from 10/23/2018 to 10/26/2018.            Pending Culture Results     No orders found from 10/23/2018 to 10/26/2018.            Thank you for choosing [facility]       Thank you for choosing [facility] for your care. Our goal is always to provide you with excellent care. Hearing back from our patients is one way we can continue to improve our services. Please take a few minutes to complete the written survey that you may receive in the mail after you visit with us. Thank you!        [employer] Information     [employer] lets you send messages to your doctor, view your test results, renew your prescriptions, schedule appointments and more. To sign up, go to [email or web address] . Click on \"Log in\" on the left side of the screen, which will take you to the Welcome page. Then click on \"Sign up Now\" on the right side of the page. "     You will be asked to enter the access code listed below, as well as some personal information. Please follow the directions to create your username and password.     Your access code is: [email or web address]  Expires: 2019  2:06 PM     Your access code will  in 90 days. If you need help or a new code, please call your [facility] clinic or [phone].        Care EveryWhere ID     This is your Care EveryWhere ID. This could be used by other organizations to access your [facility] medical records  [id number]        Equal Access to Services     [facility]: Virginia Jauregui, cruz villanueva, alexis archer, dee nelson . So [facility] [phone].    ATENCIÓN: Si habla español, tiene a price disposición servicios gratuitos de asistencia lingüística. Llame al [phone].    We comply with applicable federal civil rights laws and [place] laws. We do not discriminate on the basis of race, color, national origin, age, disability, sex, sexual orientation, or gender identity.            After Visit Summary       This is your record. Keep this with you and show to your community pharmacist(s) and doctor(s) at your next visit.

## 2018-10-26 ASSESSMENT — ENCOUNTER SYMPTOMS
LIGHT-HEADEDNESS: 1
CARDIOVASCULAR NEGATIVE: 1
DIZZINESS: 1
SINUS PRESSURE: 1
NAUSEA: 1
NUMBNESS: 1
HEADACHES: 1
CONSTITUTIONAL NEGATIVE: 1

## 2018-11-20 ENCOUNTER — OFFICE VISIT (OUTPATIENT)
Dept: OTOLARYNGOLOGY | Facility: OTHER | Age: 53
End: 2018-11-20
Attending: OTOLARYNGOLOGY
Payer: COMMERCIAL

## 2018-11-20 DIAGNOSIS — J32.0 CHRONIC MAXILLARY SINUSITIS: Primary | ICD-10-CM

## 2018-11-20 PROCEDURE — G0463 HOSPITAL OUTPT CLINIC VISIT: HCPCS

## 2018-11-20 NOTE — MR AVS SNAPSHOT
After Visit Summary   11/20/2018    Debra Park    MRN: [id number]           Patient Information     Date Of Birth          1965        Visit Information        Provider Department      11/20/2018 12:30 PM Bernardo Mark MD [facility]        Today's Diagnoses     Chronic maxillary sinusitis    -  1       Follow-ups after your visit        Your next 10 appointments already scheduled     Nov 29, 2018  7:00 AM CST   Ortho Treatment with Vonda Noha, PTA   [facility] )    [address] [address]   [phone]            Dec 04, 2018  7:00 AM CST   Ortho Treatment with Vonda Noha, PTA   [facility] )    [address] [address]   [phone]            Dec 06, 2018  7:00 AM CST   Ortho Treatment with Vonda Noha, PTA   [facility] )    [address] [address]   [phone]            Dec 12, 2018  7:30 AM CST   Ortho Treatment with Kip Thorstenson, PT   [facility] )    [address] [address]   [phone]              Who to contact     If you have questions or need follow up information about today's clinic visit or your schedule please contact [facility] directly at [phone].  Normal or non-critical lab and imaging results will be communicated to you by MyChart, letter or phone within 4 business days after the clinic has received the results. If you do not hear from us within 7 days, please contact the clinic through MyChart or phone. If you have a critical or abnormal lab result, we will notify you by phone as soon as possible.  Submit refill requests through [employer] or call your pharmacy and they will forward the refill request to us. Please allow 3 business days for your refill to be completed.          Additional Information About Your Visit    "     MyChart Information     "[employer] lets you send messages to your doctor, view your test results, renew your prescriptions, schedule appointments and more. To sign up, go to [email or web address] . Click on \"Log in\" on the left side of the screen, which will take you to the Welcome page. Then click on \"Sign up Now\" on the right side of the page.     You will be asked to enter the access code listed below, as well as some personal information. Please follow the directions to create your username and password.     Your access code is: [email or web address]  Expires: 2019  1:06 PM     Your access code will  in 90 days. If you need help or a new code, please call your [facility] clinic or [phone].        Care EveryWhere ID     This is your Care EveryWhere ID. This could be used by other organizations to access your [facility] medical records  [id number]         Blood Pressure from Last 3 Encounters:   10/25/18 (!) 119/105   17 147/100   17 114/75    Weight from Last 3 Encounters:   17 72.6 kg (160 lb)   16 74.2 kg (163 lb 9.3 oz)   13 64.9 kg (143 lb)              Today, you had the following     No orders found for display         Today's Medication Changes          These changes are accurate as of 18 11:59 PM.  If you have any questions, ask your nurse or doctor.               These medicines have changed or have updated prescriptions.        Dose/Directions    ibuprofen 200 MG tablet   Commonly known as:  ADVIL/MOTRIN   This may have changed:  how much to take        Dose:  600 mg   Take 3 tablets (600 mg) by mouth every 6 hours as needed for pain   Quantity:  60 tablet   Refills:  0                Primary Care Provider Office Phone # Fax #    Kavon Morales [phone]       [facility] [address]  [facility] [address]        Equal Access to Services     [facility]: Virginia Jauregui, cruz villanueva, alexis bone " dee archerraiza nojose nelson ahFlaca Galaviz [facility] [phone].    ATENCIÓN: Si dominicla orlando, tiene a price disposición servicios gratuitos de asistencia lingüística. Daniel al [phone].    We comply with applicable federal civil rights laws and [place] laws. We do not discriminate on the basis of race, color, national origin, age, disability, sex, sexual orientation, or gender identity.            Thank you!     Thank you for choosing [facility] AND [facility]  for your care. Our goal is always to provide you with excellent care. Hearing back from our patients is one way we can continue to improve our services. Please take a few minutes to complete the written survey that you may receive in the mail after your visit with us. Thank you!             Your Updated Medication List - Protect others around you: Learn how to safely use, store and throw away your medicines at www.disposemymeds.org.          This list is accurate as of 11/20/18 11:59 PM.  Always use your most recent med list.                   Brand Name Dispense Instructions for use Diagnosis    albuterol 108 (90 Base) MCG/ACT inhaler    PROAIR HFA    1 Inhaler    Inhale 2 puffs into the lungs 4 times daily And PRN        fluticasone 50 MCG/ACT nasal spray    FLONASE     Spray 1 spray into both nostrils daily        ibuprofen 200 MG tablet    ADVIL/MOTRIN    60 tablet    Take 3 tablets (600 mg) by mouth every 6 hours as needed for pain        LANsoprazole 30 MG DR capsule    PREVACID    30 capsule    Take 1 capsule (30 mg) by mouth 2 times daily For 2 weeks, then daily.        meclizine 12.5 MG tablet    ANTIVERT    30 tablet    Take 1 tablet (12.5 mg) by mouth 4 times daily as needed for dizziness

## 2018-11-27 ENCOUNTER — HOSPITAL ENCOUNTER (OUTPATIENT)
Dept: PHYSICAL THERAPY | Facility: HOSPITAL | Age: 53
Setting detail: THERAPIES SERIES
End: 2018-11-27
Attending: FAMILY MEDICINE
Payer: COMMERCIAL

## 2018-11-27 PROCEDURE — 97162 PT EVAL MOD COMPLEX 30 MIN: CPT | Mod: GP

## 2018-11-27 PROCEDURE — 97110 THERAPEUTIC EXERCISES: CPT | Mod: GP

## 2018-11-27 NOTE — PROGRESS NOTES
PERLADARLENE BAINSLIND    53 Y old Female, : 1965    Account Number: [id number]    [address] [id number]    Home: [phone]     Guarantor: MONIQUE LORD Insurance: [employer] Payer ID: [id number]   PCP: MELLY LIMON   Appointment Facility: [facility]      2018 Progress Notes: Bernardo Mark MD     Reason for Appointment   1. NASAL POLYPS   2. Chronic sinus complaints     History of Present Illness   HPI:   The patient is a 53-year-old female with chronic nasal and sinus issues. She has had lingering pressure and headaches with nasal drainage and stuffiness. She had a CT scan obtained late last winter that showed diffuse pansinusitis. She had CT scans repeated last month that shows lingering chronic inflammation in the maxillary sinuses. He has been on long-term nasal steroid and nasal saline irrigation. She had allergy testing in the distant past that showed multiple sensitivities.     Examination   General Examination:  Oral cavity oropharynx free of lesions or inflammation   Nasal-she has boggy nasal membranes but no purulence or polyps visible   Neck-no masses or adenopathy   Head neck integument-Clear   General-the patient appears well and in no distress   Neuro-there are no focal cranial nerve deficits   Review of her most recent CT scan reveals thickening of the maxillary sinuses and bilateral retention cysts with obstruction of the ostiomeatal unit.       Assessments     1. Chronic maxillary sinusitis - J32.0 (Primary)     Treatment   1. Others   Notes: I would advise that she consider endoscopic decompression of her maxillary sinuses at this time. The procedure, expected postoperative course, possible complications were outlined. Should she fail get to get significant relief of symptoms return to her allergist would be the next appropriate step.  Procedures  [ ].    Electronically signed by BERNARDO MARK MD on 2018 at 11:48 AM CST Sign off  status: Completed    [facility] [place]  [address] [place] [address]  Tel: [phone]  Fax:           Patient: MONIQUE LORD : 1965 Progress Note: Bernardo Mark MD 2018      Note generated by [employer] EMR/PM Software ([email or web address])

## 2018-11-27 NOTE — PROGRESS NOTES
Initial Physical Therapy Evaluation      Name: Debra Park MRN# [id number]   Age: 53 year old YOB: 1965     Date of Consultation: November 27, 2018  Primary care provider: Kavon Morales    Referring Physician: Dr. Morales  Orders: Eval and Treat  Medical Diagnosis: R anterior shoulder pain  Onset of Illness/Injury: Roughly 1 year ago    Reason for PT Visit: Patient is a 54 y/o female who presents with chronic R shoulder pain and R arm numbness/tingling. States that she has noticed some numbness/tingling into R arm/hand area over the past year or so. 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 Has a history of R elbow pain as well that was diagnosed at tennis elbow. Does use a brace around R elbow that does seem to help with the pain. States that she was experiencing some dizziness and vertigo type symptoms - went into ER where they took a CT scan which showed degeneration at C5-C6 area.  Prior Level of Function: History of R shoulder/arm pain for roughly 1 year   Pain: 2-8/10  Throughout the day    Community Support/Living Environment/Employment History: Works full time at a desk job and      Patient/Family Goal: decrease pain, improve strength    Fall Screen:   Have you fallen 2 or more times in the last year? Yes  Have you fallen and had an injury in the last year? No  Timed up & go:   Is patient a fall risk? No    Past Medical History:   Past Medical History:   Diagnosis Date     Anxiety      Depressive disorder      GERD (gastroesophageal reflux disease)        Past Surgical History:  Past Surgical History:   Procedure Laterality Date     BREAST SURGERY      augmentation     CHOLECYSTECTOMY       ENT SURGERY      T&A     GYN SURGERY      endo ablation     ORTHOPEDIC SURGERY      rotator cuff repair     SOFT TISSUE SURGERY      basal cell ca       Medications:   Current Outpatient Prescriptions   Medication Sig     albuterol (ALBUTEROL) 108 (90  BASE) MCG/ACT Inhaler Inhale 2 puffs into the lungs 4 times daily And PRN     fluticasone (FLONASE) 50 MCG/ACT spray Spray 1 spray into both nostrils daily     ibuprofen (ADVIL/MOTRIN) 200 MG tablet Take 3 tablets (600 mg) by mouth every 6 hours as needed for pain (Patient taking differently: Take 400 mg by mouth every 6 hours as needed for pain )     LANsoprazole (PREVACID) 30 MG CR capsule Take 1 capsule (30 mg) by mouth 2 times daily For 2 weeks, then daily.     meclizine (ANTIVERT) 12.5 MG tablet Take 1 tablet (12.5 mg) by mouth 4 times daily as needed for dizziness     No current facility-administered medications for this encounter.        Imaging: CT of cervical spine on 10/25/18:    IMPRESSION: Degenerative changes of the cervical spine most severe at  C5-C6 and C6-C7     ISRAEL FREY MD      Musculoskeletal Findings:     OBJECTIVE   Observation: Patient appears to PT in to acute distress  Palpation: Tenderness with palpation to R anterior shoulder structures (coracoid, long head of biceps)    Posture: forward head, protracted shoulders  Sitting Posture: Fair   Standing Posture: Fair    Neurological Assessment:   Reflexes - Right:  Biceps: +2  Triceps: +2    Reflexes - Left:  Biceps:  +2  Triceps: +2     Myotomes:  Right upper extremity: no weakness   Left upper extremity: no weakness    Range of Motion/Strength:   Cervical range of motion (active):  30 degrees flexion  30 degrees extension  55 degrees R rotation  65 degrees L rotation    Shoulder Range of Motion and Strength    RIGHT Shoulder ROM (Active)  Flexion: 160 degrees  Abduction: 150 degree   Internal Rotation:   Functional IR: L3 spinous process - pain   External Rotation:   Functional ER: WNLs     LEFT Shoulder ROM (Active)  Flexion: 160 degrees  Abduction: 150 degrees  Internal Rotation:   Functional IR: L2 spinous process   External Rotation:   Functional ER: WNLs   Bilateral elbow, wrist, and hand range of motion and strength within normal  limits.  Lower and middle trapezius strength 4/5 bilaterally.    STRENGTH                                                 Right                       Left  Flexion:                              4/5                         4+/5  Extension:                                                     IR:                                      4/5                         4/5  ER:                                     3+/5                         4/5  Abduction:                         3+/5                         4-/5    Special Tests:  Cervical Spine Tests  Spurling: -  Reverse Spurling: +   Passive Intervertebral Movement Testing: Pain reproduction down R arm with central and unilateral PAs through lower cervical spine  Distraction: Reduction in symptoms with distraction in supine and seated position    Shoulder Special Tests:  Nimesh s: -   Félix Sky: -  Coracoid: -  Crossarm:   Haley s: +  Drop-Arm: -  Speeds:   Empty Can:   Yergason:   Crank:   External Rotation Lag Sign:       Short-term goals:  To be achieved in 2-3 weeks:  Instruct in home program    1.) Patient will understand biomechanical stressors of the shoulder joint in order to make modifications to activities to reduce further risk of injury.  2.) Patient will be independent with a short-term home exercise program.  3.) Patient will report 25% improvement of overall symptoms to allow decreased shoulder pain with daily movement and activity.       Long-term goals:  To be achieved in 8 weeks:  Self management of symptoms  Return to previous level of function    1.) Improve score on Shoulder Pain and Disability Index by 13 points to correlate with clinically significant change.  2.) Patient will report 50% improvement of overall symptoms to allow decreased shoulder pain with daily movement and activity  3.) Patient will report reduced numbness and tingling in R arm with daily activities to allow increased ability to perform daily desk and bartending  activities.    Discharge goals: Patient will be independent with a home program for self-management of symptoms.      Outcome Measures:   SPADI: 74.62%    Prognosis/Plan of Care: Good  Appropriate for Physical Therapy Intervention: Yes       Planned Interventions: Therapeutic exercise, manual therapy, therapeutic activity, patient education, mechanical traction    Patient presents today with signs and symptoms consistent of R sided cervical radiculopathy with associated R elbow tendinothapy. Therapy today consisted of evaluation, patient education, and therapeutic exercise. Patient would benefit from continued PT sessions addressing overall pain and dysfunction with use of appropriate interventions.    Clinical Impressions:  Criteria for Skilled Therapeutic Intervention Met: Yes  PT Diagnosis: Cervicalgia with R sided radiculopathy  Influenced by the following impairments: Pain, weakness, numbness/tingling  Functional limitations due to impairment: Difficulty performing daily movements at work without increased pain  Clinical presentation: Stable/Uncomplicated  Clinical presentation rationale: Clinical judgement  Clinical Decision making (complexity): Moderate Complexity  Predicted Duration of Therapy Intervention (days/wks): 8-10 weeks  Risks and Benefits of therapy have been explained: Yes  Patient, Family & other staff in agreement with plan of care: Yes  Comments:     Treatment Rendered/Intervention:  Evaluation completed as described above followed by discussion of exam findings and plan of care.    Therapeutic exercise: Patient instructed in and demonstrated proper performance of home exercise program consisting of:  Supine and seated chin tuck, median nerve glide, horizontal banded abduction      Educated in posture principles and neutral spine positioning. Patient was instructed in home use of heat and/or ice for pain management      Total Evaluation Time: 45 minutes

## 2018-11-29 ENCOUNTER — HOSPITAL ENCOUNTER (OUTPATIENT)
Dept: PHYSICAL THERAPY | Facility: HOSPITAL | Age: 53
Setting detail: THERAPIES SERIES
End: 2018-11-29
Attending: FAMILY MEDICINE
Payer: COMMERCIAL

## 2018-11-29 PROCEDURE — 97110 THERAPEUTIC EXERCISES: CPT | Mod: GP

## 2018-11-29 PROCEDURE — 97140 MANUAL THERAPY 1/> REGIONS: CPT | Mod: GP

## 2018-12-04 ENCOUNTER — HOSPITAL ENCOUNTER (OUTPATIENT)
Dept: PHYSICAL THERAPY | Facility: HOSPITAL | Age: 53
Setting detail: THERAPIES SERIES
End: 2018-12-04
Attending: FAMILY MEDICINE
Payer: COMMERCIAL

## 2018-12-04 PROCEDURE — 97140 MANUAL THERAPY 1/> REGIONS: CPT | Mod: GP

## 2018-12-06 ENCOUNTER — HOSPITAL ENCOUNTER (OUTPATIENT)
Dept: PHYSICAL THERAPY | Facility: HOSPITAL | Age: 53
Setting detail: THERAPIES SERIES
End: 2018-12-06
Attending: FAMILY MEDICINE
Payer: COMMERCIAL

## 2018-12-06 PROCEDURE — 97140 MANUAL THERAPY 1/> REGIONS: CPT | Mod: GP

## 2018-12-06 PROCEDURE — 97110 THERAPEUTIC EXERCISES: CPT | Mod: GP

## 2018-12-12 ENCOUNTER — HOSPITAL ENCOUNTER (OUTPATIENT)
Dept: PHYSICAL THERAPY | Facility: HOSPITAL | Age: 53
Setting detail: THERAPIES SERIES
End: 2018-12-12
Attending: FAMILY MEDICINE
Payer: COMMERCIAL

## 2018-12-12 PROCEDURE — 97110 THERAPEUTIC EXERCISES: CPT | Mod: GP

## 2018-12-12 PROCEDURE — 97140 MANUAL THERAPY 1/> REGIONS: CPT | Mod: GP

## 2019-01-08 ENCOUNTER — OFFICE VISIT (OUTPATIENT)
Dept: OTOLARYNGOLOGY | Facility: OTHER | Age: 54
End: 2019-01-08
Attending: OTOLARYNGOLOGY
Payer: COMMERCIAL

## 2019-01-08 DIAGNOSIS — Z09 POSTOP CHECK: Primary | ICD-10-CM

## 2019-01-08 PROCEDURE — G0463 HOSPITAL OUTPT CLINIC VISIT: HCPCS | Performed by: OTOLARYNGOLOGY

## 2019-01-10 ENCOUNTER — HOSPITAL ENCOUNTER (OUTPATIENT)
Dept: PHYSICAL THERAPY | Facility: HOSPITAL | Age: 54
Setting detail: THERAPIES SERIES
End: 2019-01-10
Attending: FAMILY MEDICINE
Payer: COMMERCIAL

## 2019-01-10 PROCEDURE — 97140 MANUAL THERAPY 1/> REGIONS: CPT | Mod: GP

## 2019-01-10 PROCEDURE — 97110 THERAPEUTIC EXERCISES: CPT | Mod: GP

## 2019-01-11 NOTE — PROGRESS NOTES
MONIQUE LORD    53 Y old Female, : 1965    Account Number: [id number]    [id number]    Home: [phone]     Guarantor: MONIQUE LODR Insurance: [employer] Payer ID: [id number]   PCP: MELLY LIMON   Appointment Facility: [facility]      2019 Progress Notes: Bernardo Mark MD       Reason for Appointment   1. PO SINUS   2. Postop check   History of Present Illness   HPI:   The patient is a 53-year-old female who underwent recent endoscopic sinus surgery. She is having some lingering right-sided central headache. She denies purulent drainage. She has not been doing regular saline irrigation.   Examination   General Examination:  Nasal-the middle meati are patent. There is no synechiae formation. There is no purulence visible.     Assessments   1. Postop check - Z09 (Primary)   Treatment   1. Others   Notes: She was encouraged to continue with twice daily nasal saline irrigations. If she gets 3 months out from surgery and still having significant symptoms I would like to repeat an image.  Procedures  [ ].          Follow Up   prn               Electronically signed by BERNARDO MARK MD on 01/10/2019 at 08:28 AM CST   Sign off status: Completed          [facility]  [address] [place] [address]  Tel: [phone]  Fax:           Patient: MONIQUE LORD : 1965 Progress Note: Bernardo Mark MD 2019      Note generated by [employer] EMR/PM Software ([email or web address])

## 2019-01-22 ENCOUNTER — HOSPITAL ENCOUNTER (OUTPATIENT)
Dept: PHYSICAL THERAPY | Facility: HOSPITAL | Age: 54
Setting detail: THERAPIES SERIES
End: 2019-01-22
Attending: FAMILY MEDICINE
Payer: COMMERCIAL

## 2019-01-22 PROCEDURE — 97110 THERAPEUTIC EXERCISES: CPT | Mod: GP

## 2019-01-22 PROCEDURE — 97140 MANUAL THERAPY 1/> REGIONS: CPT | Mod: GP

## 2019-01-24 ENCOUNTER — HOSPITAL ENCOUNTER (OUTPATIENT)
Dept: PHYSICAL THERAPY | Facility: HOSPITAL | Age: 54
Setting detail: THERAPIES SERIES
End: 2019-01-24
Attending: FAMILY MEDICINE
Payer: COMMERCIAL

## 2019-01-24 PROCEDURE — 97140 MANUAL THERAPY 1/> REGIONS: CPT | Mod: GP

## 2019-01-24 NOTE — PROGRESS NOTES
Outpatient Physical Therapy Discharge Note     Patient: Debra Park  : 1965    Beginning/End Dates of Reporting Period:  18 to 2019    Referring Provider: Dr. Morales    Therapy Diagnosis: R anterior shoulder pain      Client Self Report: Patient reports today for neck and R shoulder discomfort. States that R sided neck and shoulder pain is 50% improved or a little better. Exercises have been going well. Feels comfortable with today being last day of therapy.      Outcome Measures (most recent score):  SPADI - 70% disability    Goals:  Short-term goals:  To be achieved in 2-3 weeks:  Instruct in home program MET     1.) Patient will understand biomechanical stressors of the shoulder joint in order to make modifications to activities to reduce further risk of injury. MET  2.) Patient will be independent with a short-term home exercise program. MET  3.) Patient will report 25% improvement of overall symptoms to allow decreased shoulder pain with daily movement and activity. MET        Long-term goals:  To be achieved in 8 weeks:  Self management of symptoms MET  Return to previous level of function MET     1.) Improve score on Shoulder Pain and Disability Index by 13 points to correlate with clinically significant change. 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 MET        Plan:  Discharge from therapy.    Discharge:    Reason for Discharge: Throughout course of therapy, patient has improved overall awareness of aggravating factors, improved strength, and reduced overall pain. Patient to utilize HEP provided for effective management and further improvement of overall symptoms outside of clinic.    Equipment Issued: None    Discharge  Plan: Patient to continue home program.

## 2019-04-01 ENCOUNTER — TRANSFERRED RECORDS (OUTPATIENT)
Dept: HEALTH INFORMATION MANAGEMENT | Facility: CLINIC | Age: 54
End: 2019-04-01

## 2019-04-04 ENCOUNTER — HOSPITAL ENCOUNTER (OUTPATIENT)
Dept: PHYSICAL THERAPY | Facility: HOSPITAL | Age: 54
Setting detail: THERAPIES SERIES
End: 2019-04-04
Attending: FAMILY MEDICINE
Payer: COMMERCIAL

## 2019-04-04 PROCEDURE — 97161 PT EVAL LOW COMPLEX 20 MIN: CPT | Mod: GP

## 2019-04-04 PROCEDURE — 97140 MANUAL THERAPY 1/> REGIONS: CPT | Mod: GP

## 2019-04-04 PROCEDURE — 97110 THERAPEUTIC EXERCISES: CPT | Mod: GP

## 2019-04-04 NOTE — PROGRESS NOTES
Initial Physical Therapy Evaluations       Name: Debra Park MRN# [id number]   Age: 54 year old YOB: 1965     Date of Consultation: April 4, 2019  Primary care provider: Kavon Morales    Referring Physician:   Orders: Eval and Treat  Medical Diagnosis: Cervical Spine Pain   Onset of Illness/Injury: 4/1/18     Reason for PT Visit: Patient is a 53 y/o female who presents with cervical spine pain w/ degenerative changes noted at C5-C7.  Patient is currently having neck pain progressing throughout the day as well as having numbness and tingling into the hands at times. Patient has done therapy in the past for neck pain which was beneficial pain including neck stretching and strengthening.   Patient notes she is also having some anterior shoulder pain in the axillary area that may have come on with throwing soft balls.    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 Yes  Have you fallen and had an injury in the last year? No  Timed up & go: n/a - falls related to ice  Is patient a fall risk? No    Past Medical History:   Past Medical History:   Diagnosis Date     Anxiety      Depressive disorder      GERD (gastroesophageal reflux disease)        Past Surgical History:  Past Surgical History:   Procedure Laterality Date     BREAST SURGERY      augmentation     CHOLECYSTECTOMY       ENT SURGERY      T&A     GYN SURGERY      endo ablation     ORTHOPEDIC SURGERY      rotator cuff repair     SOFT TISSUE SURGERY      basal cell ca       Medications:   Current Outpatient Medications   Medication Sig     albuterol (ALBUTEROL) 108 (90 BASE) MCG/ACT Inhaler Inhale 2  puffs into the lungs 4 times daily And PRN     fluticasone (FLONASE) 50 MCG/ACT spray Spray 1 spray into both nostrils daily     ibuprofen (ADVIL/MOTRIN) 200 MG tablet Take 3 tablets (600 mg) by mouth every 6 hours as needed for pain (Patient taking differently: Take 400 mg by mouth every 6 hours as needed for pain )     LANsoprazole (PREVACID) 30 MG CR capsule Take 1 capsule (30 mg) by mouth 2 times daily For 2 weeks, then daily.     meclizine (ANTIVERT) 12.5 MG tablet Take 1 tablet (12.5 mg) by mouth 4 times daily as needed for dizziness     No current facility-administered medications for this encounter.        Imaging:     Musculoskeletal Findings:     OBJECTIVE   Observation: Patient presents to department in no acute distress.     Palpation: Patient has tenderness in axillary lateral border of the scap as well as at the coracoid w/ OP.  No notable increase in tone in cervical spine     Posture: Forward head, protracted shoulders   Sitting Posture: Good   Standing Posture: Good    Neurological Assessment:   Reflexes:   Biceps, brachioradialis, and triceps reflexes 2+    Myotomes:   Right upper extremity: within functional limits and symmetrical   Left upper extremity: within functional limits and symmetrical     Dermatomes: no specific dermatomal noted at rest  Right upper extremity: within functional limits and symmetrical   Left upper extremity: within functional limits and symmetrical     Range of Motion/Strength:   Cervical Spine Range of Motion   Active Motion   Flexion WNL    Extension 5* suboccipital compensation   Side Bend Right 8* increase in pain   Side Bend Left 10*   Rotation Right 45*    Rotation Left 45*    Right upper extremity range of motion: WNL   Left upper extremity range of motion: WNL     Right upper extremity strength: WNL except lower trapezius 5/5 and middle trapezius 3/5  Left upper extremity strength: WNL except lower trapezius 5/5 and middle trapezius 3/5  Deep Neck Flexor Endurance  Test: NT seconds prior to substitution    Special Tests:   Cervical Spine Tests  Spurling: R - Pos for R joint pain L -  neg  Passive Intervertebral Movement Testing: PA mobilizations give relief of symptoms  Median Nerve Tension Test: Neg  Radial Nerve Tension Test: neg  Ulnar Nerve Tension Test: neg  AO mobility:Good  AA rotation: Good       Outcome Measures:   NDI - 32%      Prognosis/Plan of Care: Patient has a good prognosis for the following goals   Appropriate for Physical Therapy Intervention: Yes     GOALS:   Short-term goals:  To be achieved in 2-3 weeks:    Instruct in home program  1.) Patient will be independent with a short-term home exercise program.  2.) Patient will understand and demonstrate improved posture and techniques such that patient places less strain over cervical spine.  3.) Patient will report a 25% or greater improvement in symptoms in order to allow for increased comfort with activities of daily living.   4.) Patient will have reduction in pain w/ repetitive IR of R shoulder in order to return to throwing soft balls w/o limitations        Long-term goals:  To be achieved in 6-8 weeks:    1.) Improve score on Neck Disability Index by 50% to correlate with clinically significant change.  2.) Patient will report a 75% or greater improvement in symptoms in order to allow for increased comfort with activities of daily living.  3.) Patient will be able to perform a full day of work w/o limitation secondary to pain.        Discharge goals: Patient will be independent with a home program for self-management of symptoms.      Patient presents today with signs and symptoms consistent of degenerative changes at lower cervical spine w/ radicular symptoms into B UE, w/ additional potential R subscap tendonitis or injury w/o loss of strength but with increased pain. Therapy today consisted of evaluation, patient education, and therapeutic exercise. Patient would benefit from continued PT sessions  addressing overall pain and dysfunction with use of appropriate interventions.    Treatment plan:  1.) Modalities including ultrasound, electrical stimulation, Dry Needling, and mechanical traction as needed for pain management and increasing tissue extensibility  2.) Manual therapy to include cervical and thoracic spine joint and soft tissue mobilization for improved mobility and decreased pain  3.) Therapeutic exercise to consist of cervical stabilization and scapular strengthening and range of motion activities    Clinical Impressions:  Criteria for Skilled Therapeutic Intervention Met: Yes  PT Diagnosis: Cervical Pain w/ radicular symptoms  Influenced by the following impairments: Neck Pain, Reduced Neck ROM, Shoulder Pain  Functional limitations due to impairment: Throwing, Working, Driving   Clinical presentation: Stable/Uncomplicated  Clinical presentation rationale: Therapist Discretion  Clinical Decision making (complexity): Low Complexity  Predicted Duration of Therapy Intervention (days/wks): 2x for up to 8 weeks   Risks and Benefits of therapy have been explained: Yes  Patient, Family & other staff in agreement with plan of care: Yes  Comments: n/a  Frequency: 2x times/week  Date Range: 4/4/19 to 6/4/19     Total Evaluation Time: 25

## 2019-04-08 ENCOUNTER — HOSPITAL ENCOUNTER (OUTPATIENT)
Dept: PHYSICAL THERAPY | Facility: HOSPITAL | Age: 54
Setting detail: THERAPIES SERIES
End: 2019-04-08
Attending: FAMILY MEDICINE
Payer: COMMERCIAL

## 2019-04-08 PROCEDURE — 97110 THERAPEUTIC EXERCISES: CPT | Mod: GP

## 2019-04-08 PROCEDURE — 97140 MANUAL THERAPY 1/> REGIONS: CPT | Mod: GP

## 2019-04-16 ENCOUNTER — HOSPITAL ENCOUNTER (OUTPATIENT)
Dept: PHYSICAL THERAPY | Facility: HOSPITAL | Age: 54
Setting detail: THERAPIES SERIES
End: 2019-04-16
Attending: FAMILY MEDICINE
Payer: COMMERCIAL

## 2019-04-16 PROCEDURE — 97140 MANUAL THERAPY 1/> REGIONS: CPT | Mod: GP

## 2019-04-18 ENCOUNTER — HOSPITAL ENCOUNTER (EMERGENCY)
Facility: HOSPITAL | Age: 54
Discharge: HOME | End: 2019-04-18
Attending: PHYSICIAN ASSISTANT | Admitting: PHYSICIAN ASSISTANT
Payer: COMMERCIAL

## 2019-04-18 VITALS
TEMPERATURE: 97.5 F | RESPIRATION RATE: 18 BRPM | BODY MASS INDEX: 26.63 KG/M2 | SYSTOLIC BLOOD PRESSURE: 142 MMHG | OXYGEN SATURATION: 98 % | WEIGHT: 160 LBS | HEART RATE: 60 BPM | DIASTOLIC BLOOD PRESSURE: 94 MMHG

## 2019-04-18 DIAGNOSIS — M54.50 LOW BACK PAIN: ICD-10-CM

## 2019-04-18 PROCEDURE — 99213 OFFICE O/P EST LOW 20 MIN: CPT | Mod: Z6 | Performed by: PHYSICIAN ASSISTANT

## 2019-04-18 PROCEDURE — G0463 HOSPITAL OUTPT CLINIC VISIT: HCPCS

## 2019-04-18 RX ORDER — HYDROCODONE BITARTRATE AND ACETAMINOPHEN 5; 325 MG/1; MG/1
1 TABLET ORAL EVERY 4 HOURS PRN
Qty: 15 TABLET | Refills: 0 | Status: SHIPPED | OUTPATIENT
Start: 2019-04-18 | End: 2019-04-21

## 2019-04-18 RX ORDER — GABAPENTIN 100 MG/1
100 CAPSULE ORAL 3 TIMES DAILY
COMMUNITY
End: 2021-04-30

## 2019-04-18 RX ORDER — CYCLOBENZAPRINE HCL 10 MG
10 TABLET ORAL 3 TIMES DAILY PRN
Qty: 20 TABLET | Refills: 0 | Status: SHIPPED | OUTPATIENT
Start: 2019-04-18 | End: 2019-04-24

## 2019-04-18 ASSESSMENT — ENCOUNTER SYMPTOMS
CHEST TIGHTNESS: 0
BACK PAIN: 1
NAUSEA: 0
NECK STIFFNESS: 0
VOMITING: 0
CHILLS: 0
SHORTNESS OF BREATH: 0
ABDOMINAL PAIN: 0
NECK PAIN: 0
FEVER: 0
COUGH: 0

## 2019-04-18 NOTE — ED AVS SNAPSHOT
[facility]  [address]  [place] [address]  Phone:  [phone]                                    Debra Park   MRN: [id number]    Department:  [facility]   Date of Visit:  4/18/2019           After Visit Summary Signature Page    I have received my discharge instructions, and my questions have been answered. I have discussed any challenges I see with this plan with the nurse or doctor.    ..........................................................................................................................................  Patient/Patient Representative Signature      ..........................................................................................................................................  Patient Representative Print Name and Relationship to Patient    ..................................................               ................................................  Date                                   Time    ..........................................................................................................................................  Reviewed by Signature/Title    ...................................................              ..............................................  Date                                               Time          22EPIC Rev 08/18

## 2019-04-18 NOTE — DISCHARGE INSTRUCTIONS
Flexeril and Hydrocodone as needed.     Follow-up with primary care and PT.    Please return here for any worsening symptoms, new symptoms, or other concerns.

## 2019-04-18 NOTE — ED PROVIDER NOTES
"  History     Chief Complaint   Patient presents with     Back Pain     \"right lower back pain started yesterday, Deep Heat Rub and Ibuprofen helped last night\"      The history is provided by the patient.     Debra Park is a 54 year old female who presented to the urgent care ambulatory for evaluation of right lower back pain.  The pain has been present for approximate the last 2 days.  Ibuprofen seems to help slightly.  Gabapentin has been tried as well without relief.  Pain is in the right lower back with some radiation to the right hip and abdomen.  She denies any fevers, chills, vomiting, diarrhea, chest pain, shortness of breath, history of IV drug abuse, or other concerns.  She does have a history of skin cancer.  Denies any bowel or bladder dysfunction.  Denies any recent falls.    Allergies:  Allergies   Allergen Reactions     Augmentin Nausea and Vomiting     Cats Swelling       Problem List:    Patient Active Problem List    Diagnosis Date Noted     Facial cellulitis 08/29/2016     Priority: Medium        Past Medical History:    Past Medical History:   Diagnosis Date     Anxiety      Depressive disorder      GERD (gastroesophageal reflux disease)        Past Surgical History:    Past Surgical History:   Procedure Laterality Date     BREAST SURGERY      augmentation     CHOLECYSTECTOMY       ENT SURGERY      T&A     GYN SURGERY      endo ablation     ORTHOPEDIC SURGERY      rotator cuff repair     SOFT TISSUE SURGERY      basal cell ca       Family History:    Family History   Problem Relation Age of Onset     Hypertension Mother      Hypertension Father      Diabetes Father      Hyperlipidemia Father        Social History:  Marital Status:   [4]  Social History     Tobacco Use     Smoking status: Current Every Day Smoker     Packs/day: 1.00     Years: 35.00     Pack years: 35.00     Smokeless tobacco: Never Used   Substance Use Topics     Alcohol use: Yes     Comment: social     Drug use: " No        Medications:      cyclobenzaprine (FLEXERIL) 10 MG tablet   fluticasone (FLONASE) 50 MCG/ACT spray   gabapentin (NEURONTIN) 100 MG capsule   HYDROcodone-acetaminophen (NORCO) 5-325 MG tablet   LANsoprazole (PREVACID) 30 MG CR capsule   albuterol (ALBUTEROL) 108 (90 BASE) MCG/ACT Inhaler   ibuprofen (ADVIL/MOTRIN) 200 MG tablet   meclizine (ANTIVERT) 12.5 MG tablet         Review of Systems   Constitutional: Negative for chills and fever.   Respiratory: Negative for cough, chest tightness and shortness of breath.    Cardiovascular: Negative for chest pain.   Gastrointestinal: Negative for abdominal pain, nausea and vomiting.   Genitourinary: Negative.    Musculoskeletal: Positive for back pain. Negative for neck pain and neck stiffness.   Skin: Negative.        Physical Exam   BP: 142/94  Pulse: 60  Temp: 97.5  F (36.4  C)  Resp: 18  Weight: 72.6 kg (160 lb)  SpO2: 98 %      Physical Exam   Constitutional: She is oriented to person, place, and time. She appears well-developed and well-nourished. No distress.   Pleasant and talkative.   Cardiovascular: Normal rate and regular rhythm.   Pulmonary/Chest: Effort normal.   Musculoskeletal:        Back:    She has steady gait with obvious favoring of the right leg.  She is able to stand on her tiptoes with both legs but reports worsening pain on the right with this maneuver.  She is able to step down from a step using her right leg with increasing pain.  Has focal tenderness over the right SI joint.  The patient tells me that is the exact etiology of her pain.  There is no evidence of erythema, swelling, or heat.  No CVA tenderness.   Neurological: She is alert and oriented to person, place, and time.   Skin: Skin is warm and dry. Capillary refill takes less than 2 seconds.   Psychiatric: She has a normal mood and affect.   Nursing note and vitals reviewed.      ED Course        Procedures               Critical Care time:  none               No results found for  this or any previous visit (from the past 24 hour(s)).    Medications - No data to display    Assessments & Plan (with Medical Decision Making)   History of present illness, exam, and symptoms are most consistent with a musculoskeletal origin.  Acute low back pain that is focal on the right SI joint.  No fevers or chills.  No history of IV drug abuse.  No bowel or bladder dysfunction.  Worsening of the symptoms upon ambulation and weightbearing of the right leg.  No red flags.  I believe we can certainly treat with a small amount of pain medications and muscle relaxation.  Close follow-up in the clinic.  Follow-up with physical therapy and chiropractic.  Return here for any worsening symptoms, new symptoms, or other concerns.    This document was prepared using a combination of typing and voice generated software.  While every attempt was made for accuracy, spelling and grammatical errors may exist.    I have reviewed the nursing notes.    I have reviewed the findings, diagnosis, plan and need for follow up with the patient.          Medication List      Started    cyclobenzaprine 10 MG tablet  Commonly known as:  FLEXERIL  10 mg, Oral, 3 TIMES DAILY PRN     HYDROcodone-acetaminophen 5-325 MG tablet  Commonly known as:  NORCO  1 tablet, Oral, EVERY 4 HOURS PRN            Final diagnoses:   Low back pain       4/18/2019   [facility]     Tameka Cochran PA-C  04/18/19 [id number]

## 2019-04-22 ENCOUNTER — HOSPITAL ENCOUNTER (OUTPATIENT)
Dept: PHYSICAL THERAPY | Facility: HOSPITAL | Age: 54
Setting detail: THERAPIES SERIES
End: 2019-04-22
Attending: FAMILY MEDICINE
Payer: COMMERCIAL

## 2019-04-22 PROCEDURE — 97110 THERAPEUTIC EXERCISES: CPT | Mod: GP

## 2019-04-22 PROCEDURE — 97140 MANUAL THERAPY 1/> REGIONS: CPT | Mod: GP

## 2019-04-25 ENCOUNTER — HOSPITAL ENCOUNTER (OUTPATIENT)
Dept: PHYSICAL THERAPY | Facility: HOSPITAL | Age: 54
Setting detail: THERAPIES SERIES
End: 2019-04-25
Attending: FAMILY MEDICINE
Payer: COMMERCIAL

## 2019-04-25 PROCEDURE — 97140 MANUAL THERAPY 1/> REGIONS: CPT | Mod: GP

## 2019-04-25 PROCEDURE — 97110 THERAPEUTIC EXERCISES: CPT | Mod: GP

## 2019-04-26 ENCOUNTER — TRANSFERRED RECORDS (OUTPATIENT)
Dept: HEALTH INFORMATION MANAGEMENT | Facility: CLINIC | Age: 54
End: 2019-04-26

## 2019-04-30 ENCOUNTER — HOSPITAL ENCOUNTER (OUTPATIENT)
Dept: PHYSICAL THERAPY | Facility: HOSPITAL | Age: 54
Setting detail: THERAPIES SERIES
End: 2019-04-30
Attending: FAMILY MEDICINE
Payer: COMMERCIAL

## 2019-04-30 PROCEDURE — 97110 THERAPEUTIC EXERCISES: CPT | Mod: GP

## 2019-04-30 PROCEDURE — 97140 MANUAL THERAPY 1/> REGIONS: CPT | Mod: GP

## 2019-05-07 ENCOUNTER — HOSPITAL ENCOUNTER (OUTPATIENT)
Dept: PHYSICAL THERAPY | Facility: HOSPITAL | Age: 54
Setting detail: THERAPIES SERIES
End: 2019-05-07
Attending: FAMILY MEDICINE
Payer: COMMERCIAL

## 2019-05-07 PROCEDURE — 97110 THERAPEUTIC EXERCISES: CPT | Mod: GP

## 2019-05-07 PROCEDURE — 97140 MANUAL THERAPY 1/> REGIONS: CPT | Mod: GP

## 2019-05-09 ENCOUNTER — HOSPITAL ENCOUNTER (OUTPATIENT)
Dept: PHYSICAL THERAPY | Facility: HOSPITAL | Age: 54
Setting detail: THERAPIES SERIES
End: 2019-05-09
Attending: FAMILY MEDICINE
Payer: COMMERCIAL

## 2019-05-09 PROCEDURE — 97140 MANUAL THERAPY 1/> REGIONS: CPT | Mod: GP

## 2019-05-09 PROCEDURE — 97110 THERAPEUTIC EXERCISES: CPT | Mod: GP

## 2019-05-10 ENCOUNTER — HOSPITAL ENCOUNTER (OUTPATIENT)
Dept: MRI IMAGING | Facility: HOSPITAL | Age: 54
End: 2019-05-10
Attending: FAMILY MEDICINE
Payer: COMMERCIAL

## 2019-05-10 ENCOUNTER — HOSPITAL ENCOUNTER (OUTPATIENT)
Dept: MRI IMAGING | Facility: HOSPITAL | Age: 54
Discharge: HOME | End: 2019-05-10
Attending: FAMILY MEDICINE | Admitting: FAMILY MEDICINE
Payer: COMMERCIAL

## 2019-05-10 DIAGNOSIS — M25.511 PAIN IN RIGHT SHOULDER: ICD-10-CM

## 2019-05-10 DIAGNOSIS — M54.2 NECK PAIN: ICD-10-CM

## 2019-05-10 PROCEDURE — 72141 MRI NECK SPINE W/O DYE: CPT | Mod: TC

## 2019-05-10 PROCEDURE — 73221 MRI JOINT UPR EXTREM W/O DYE: CPT | Mod: TC,RT

## 2019-05-29 ENCOUNTER — HOSPITAL ENCOUNTER (OUTPATIENT)
Dept: INTERVENTIONAL RADIOLOGY/VASCULAR | Facility: HOSPITAL | Age: 54
Discharge: HOME | End: 2019-05-29
Attending: FAMILY MEDICINE | Admitting: FAMILY MEDICINE
Payer: COMMERCIAL

## 2019-05-29 DIAGNOSIS — M54.2 CERVICALGIA: ICD-10-CM

## 2019-05-29 PROCEDURE — 25000128 H RX IP 250 OP 636: Performed by: RADIOLOGY

## 2019-05-29 PROCEDURE — 62321 NJX INTERLAMINAR CRV/THRC: CPT | Mod: TC

## 2019-05-29 PROCEDURE — 25000125 ZZHC RX 250: Performed by: RADIOLOGY

## 2019-05-29 RX ORDER — IOPAMIDOL 612 MG/ML
15 INJECTION, SOLUTION INTRATHECAL ONCE
Status: COMPLETED | OUTPATIENT
Start: 2019-05-29 | End: 2019-05-29

## 2019-05-29 RX ORDER — METHYLPREDNISOLONE ACETATE 80 MG/ML
INJECTION, SUSPENSION INTRA-ARTICULAR; INTRALESIONAL; INTRAMUSCULAR; SOFT TISSUE
Status: DISPENSED
Start: 2019-05-29 | End: 2019-05-29

## 2019-05-29 RX ORDER — METHYLPREDNISOLONE ACETATE 80 MG/ML
80 INJECTION, SUSPENSION INTRA-ARTICULAR; INTRALESIONAL; INTRAMUSCULAR; SOFT TISSUE ONCE
Status: COMPLETED | OUTPATIENT
Start: 2019-05-29 | End: 2019-05-29

## 2019-05-29 RX ADMIN — METHYLPREDNISOLONE ACETATE 80 MG: 80 INJECTION, SUSPENSION INTRA-ARTICULAR; INTRALESIONAL; INTRAMUSCULAR; SOFT TISSUE at 10:00

## 2019-05-29 RX ADMIN — IOPAMIDOL 3 ML: 612 INJECTION, SOLUTION INTRATHECAL at 10:00

## 2019-05-29 NOTE — DISCHARGE INSTRUCTIONS
Cell number on file:    Telephone Information:   Mobile [phone]     Is it ok to leave a message at this number(s)? Yes    Dr Fischer completed your procedure on 5/29/2019.    Current Pain Level (0-10 Scale): 5/10  Post Pain Level (0-10):  5/10    Radiology Discharge instructions for Steroid Injection    Activity Level:     Do not do any heavy activity or exercise for 24 hours.   Do not drive for 4 hours after your injection.  Diet:   Return to your normal diet.  Medications:   If you have stopped taking your Aspirin, Coumadin/Warfarin, Ibuprofen, or any   other blood thinner for this procedure you may resume in the morning unless   your primary care provider has given you other instructions.    Diabetics may see an increase in blood sugar after steroid injections. If you are concerned about your blood sugar, please contact your family doctor.    Site Care:  Remove the bandage and bathe or shower the morning after the procedure.      Please allow two weeks to experience improvement in your pain.  If you have any further issues, please contact your provider.    Call your Primary Care Provider if you have the following (if your primary care provider is not available please seek emergency care):   Nausea with vomiting   Severe headache   Drowsiness or confusion   Redness or drainage at the injection or puncture site   Temperature over 101 degrees F   Other concerns   Worsening back pain   Stiff neck

## 2019-05-29 NOTE — IP AVS SNAPSHOT
[facility]  [address] [address]  Phone:  [phone]  Fax:  [phone]                                    After Visit Summary   5/29/2019    Debra Park    MRN: [id number]           After Visit Summary Signature Page    I have received my discharge instructions, and my questions have been answered. I have discussed any challenges I see with this plan with the nurse or doctor.    ..........................................................................................................................................  Patient/Patient Representative Signature      ..........................................................................................................................................  Patient Representative Print Name and Relationship to Patient    ..................................................               ................................................  Date                                   Time    ..........................................................................................................................................  Reviewed by Signature/Title    ...................................................              ..............................................  Date                                               Time          22EPIC Rev 08/18

## 2019-06-12 ENCOUNTER — TELEPHONE (OUTPATIENT)
Dept: INTERVENTIONAL RADIOLOGY/VASCULAR | Facility: HOSPITAL | Age: 54
End: 2019-06-12

## 2019-06-12 NOTE — TELEPHONE ENCOUNTER
INJECTION POST CALL    Procedure: Epidural Cervical TL C7-T1  Radiologist(s): Dr. Miky Fischer  Date of Procedure:  5/29/2019      The patient was not available by telephone. Message was left.          Lynne Trinh

## 2019-06-13 ENCOUNTER — TELEPHONE (OUTPATIENT)
Dept: INTERVENTIONAL RADIOLOGY/VASCULAR | Facility: HOSPITAL | Age: 54
End: 2019-06-13

## 2019-06-13 NOTE — TELEPHONE ENCOUNTER
INJECTION POST CALL    Procedure: Epidural Cervical TL C7-T1  Radiologist(s): Dr. Miky Fischer  Date of Procedure:  5/29/2019    Pre-procedure pain score was: 5 (See pre-procedure score)  Post-procedure pain score as of today is: 4  Percentage of pain improvement today?  20%  Where is the pain located? Neck and right arm  Is this new pain? No  Would you like to be scheduled for an additional injection?  Yes      I responded to the patient's questions/concerns.    Patient will contact the provider if there are any issues.    Patient states the day after the injection she had a bad headache which subsided shortly after and injection then gave pain relief for about a week. Pain has returned to neck and the right arm. Patient wondered what people do after the first one if they do not have longer relief. Patient was informed a second injection may sometimes be more beneficial if the first one was not being it can break the barrier of inflammation and having two or three injections built up make it stronger. Patient will contact PCP and inform him she would like to go forward with an additional injection.       Lynne Trinh

## 2019-07-17 ENCOUNTER — HOSPITAL ENCOUNTER (EMERGENCY)
Facility: HOSPITAL | Age: 54
Discharge: HOME | End: 2019-07-17
Attending: NURSE PRACTITIONER | Admitting: NURSE PRACTITIONER
Payer: COMMERCIAL

## 2019-07-17 ENCOUNTER — APPOINTMENT (OUTPATIENT)
Dept: GENERAL RADIOLOGY | Facility: HOSPITAL | Age: 54
End: 2019-07-17
Attending: NURSE PRACTITIONER
Payer: COMMERCIAL

## 2019-07-17 VITALS
TEMPERATURE: 97.7 F | RESPIRATION RATE: 14 BRPM | SYSTOLIC BLOOD PRESSURE: 136 MMHG | DIASTOLIC BLOOD PRESSURE: 93 MMHG | OXYGEN SATURATION: 94 %

## 2019-07-17 DIAGNOSIS — M79.18 RIGHT BUTTOCK PAIN: ICD-10-CM

## 2019-07-17 DIAGNOSIS — S76.311A HAMSTRING MUSCLE STRAIN, RIGHT, INITIAL ENCOUNTER: ICD-10-CM

## 2019-07-17 PROCEDURE — 99213 OFFICE O/P EST LOW 20 MIN: CPT | Mod: Z6 | Performed by: NURSE PRACTITIONER

## 2019-07-17 PROCEDURE — G0463 HOSPITAL OUTPT CLINIC VISIT: HCPCS

## 2019-07-17 PROCEDURE — 72170 X-RAY EXAM OF PELVIS: CPT | Mod: TC

## 2019-07-17 RX ORDER — DICLOFENAC SODIUM 75 MG/1
TABLET, DELAYED RELEASE ORAL 2 TIMES DAILY
COMMUNITY
End: 2021-04-30

## 2019-07-17 RX ORDER — HYDROCODONE BITARTRATE AND ACETAMINOPHEN 5; 325 MG/1; MG/1
1 TABLET ORAL EVERY 4 HOURS PRN
Qty: 8 TABLET | Refills: 0 | Status: SHIPPED | OUTPATIENT
Start: 2019-07-17 | End: 2021-04-30

## 2019-07-17 RX ORDER — CITALOPRAM HYDROBROMIDE 20 MG/1
20 TABLET ORAL DAILY
COMMUNITY

## 2019-07-17 NOTE — ED AVS SNAPSHOT
[facility]  [address]  [place] [address]  Phone:  [phone]                                    Debra Park   MRN: [id number]    Department:  [facility]   Date of Visit:  7/17/2019           After Visit Summary Signature Page    I have received my discharge instructions, and my questions have been answered. I have discussed any challenges I see with this plan with the nurse or doctor.    ..........................................................................................................................................  Patient/Patient Representative Signature      ..........................................................................................................................................  Patient Representative Print Name and Relationship to Patient    ..................................................               ................................................  Date                                   Time    ..........................................................................................................................................  Reviewed by Signature/Title    ...................................................              ..............................................  Date                                               Time          22EPIC Rev 08/18

## 2019-07-18 ASSESSMENT — ENCOUNTER SYMPTOMS
MYALGIAS: 1
NUMBNESS: 0
CONSTITUTIONAL NEGATIVE: 1
COLOR CHANGE: 0

## 2019-07-18 NOTE — ED PROVIDER NOTES
"  History     Chief Complaint   Patient presents with     Muscle Pain     rt \"butt area\" muscular pain, notes playing softball a felt something pull when she was stretching     HPI  Debra Park is a 54 year old female who presents today with a CC of right buttocks pain.  She notes she stretched her right leg out while playing softball and felt a pop in her right buttocks.  She took ibuprofen 800 mg about 30 minutes ago.  No numbness or tingling.  No history of right leg injury in the past.  She ambulated in to  independently with limp.      Allergies:  Allergies   Allergen Reactions     Augmentin Nausea and Vomiting     Cats Swelling       Problem List:    Patient Active Problem List    Diagnosis Date Noted     Facial cellulitis 08/29/2016     Priority: Medium        Past Medical History:    Past Medical History:   Diagnosis Date     Anxiety      Depressive disorder      GERD (gastroesophageal reflux disease)        Past Surgical History:    Past Surgical History:   Procedure Laterality Date     BREAST SURGERY      augmentation     CHOLECYSTECTOMY       ENT SURGERY      T&A     GYN SURGERY      endo ablation     ORTHOPEDIC SURGERY      rotator cuff repair     SOFT TISSUE SURGERY      basal cell ca       Family History:    Family History   Problem Relation Age of Onset     Hypertension Mother      Hypertension Father      Diabetes Father      Hyperlipidemia Father        Social History:  Marital Status:   [4]  Social History     Tobacco Use     Smoking status: Current Every Day Smoker     Packs/day: 1.00     Years: 35.00     Pack years: 35.00     Smokeless tobacco: Never Used   Substance Use Topics     Alcohol use: Yes     Comment: social     Drug use: No        Medications:      albuterol (ALBUTEROL) 108 (90 BASE) MCG/ACT Inhaler   citalopram (CELEXA) 20 MG tablet   diclofenac (VOLTAREN) 75 MG EC tablet   gabapentin (NEURONTIN) 100 MG capsule   HYDROcodone-acetaminophen (NORCO) 5-325 MG tablet "   ibuprofen (ADVIL/MOTRIN) 200 MG tablet         Review of Systems   Constitutional: Negative.    Musculoskeletal: Positive for myalgias.   Skin: Negative for color change (no ecchymosis).   Neurological: Negative for numbness.       Physical Exam   BP: 136/93  Heart Rate: 95  Temp: 97.7  F (36.5  C)  Resp: 14  SpO2: 94 %      Physical Exam   Constitutional: She is oriented to person, place, and time. She appears well-developed. She is cooperative. She does not appear ill.   HENT:   Head: Normocephalic and atraumatic.   Cardiovascular: Normal rate.   Pulmonary/Chest: Effort normal.   Musculoskeletal:        Right hip: She exhibits tenderness (inferior buttocks, midline) and bony tenderness (right ischial tuberosity, no pain into the right hip joint). She exhibits normal range of motion, normal strength, no swelling, no crepitus, no deformity and no laceration.        Right knee: She exhibits normal range of motion. No tenderness found.        Legs:  Neurological: She is alert and oriented to person, place, and time.   Skin: Skin is warm and dry.   Psychiatric: She has a normal mood and affect. Her behavior is normal.   Nursing note and vitals reviewed.      ED Course        Procedures    Results for orders placed or performed during the hospital encounter of 07/17/19   XR Pelvis 1/2 Views    Narrative    PROCEDURE: XR PELVIS 1/2 VW 7/17/2019 9:01 PM    HISTORY: right buttocks pain, felt a pop    COMPARISONS: None.    TECHNIQUE: 1 view    FINDINGS: The pelvis is intact. The sacrum and sacroiliac joints  appear normal. Articular spaces are normal height and both hips. Both  proximal femurs are intact.         Impression    IMPRESSION: Intact pelvis.    ISRAEL FREY MD       Assessments & Plan (with Medical Decision Making)     I have reviewed the nursing notes.    I have reviewed the findings, diagnosis, plan and need for follow up with the patient.  ASSESSMENT / PLAN:  (S76.450A) Hamstring muscle strain, right,  initial encounter  Comment: no acute fracture of ischial tuberosity, n/v intact, no obvious edema or ecchymosis  Plan:  Rest, ice 20 minutes at least 4 times daily for the first 48 hours, then ice and/or heat as needed for symptomatic relief   OTC Motrin and or Tylenol as needed for pain relief   Lortab as prescribed for breakthrough pain not controlled by above - do not drive or participate in activities that require mental alertness while taking   ACE for comfort/support   Follow up with PCP in 1 week if symptoms are not improving, sooner if symptoms are worsening    (M79.18) Right buttock pain  Plan:  See above       Medication List      Started    HYDROcodone-acetaminophen 5-325 MG tablet  Commonly known as:  NORCO  1 tablet, Oral, EVERY 4 HOURS PRN            Final diagnoses:   Hamstring muscle strain, right, initial encounter   Right buttock pain       7/17/2019   [facility]     Anushka Burrell NP  07/18/19 0959

## 2019-07-18 NOTE — ED NOTES
Pt states she was playing softball when she stretched for the ball and she pulled a muscle in the right side of her butt. Also complaining of left sided sciatica pain. Ibuprofen 800 mg taken at 2010.

## 2019-08-08 ENCOUNTER — TRANSFERRED RECORDS (OUTPATIENT)
Dept: HEALTH INFORMATION MANAGEMENT | Facility: CLINIC | Age: 54
End: 2019-08-08

## 2019-08-09 DIAGNOSIS — G47.10 HYPERSOMNIA: Primary | ICD-10-CM

## 2019-08-21 ENCOUNTER — TRANSFERRED RECORDS (OUTPATIENT)
Dept: HEALTH INFORMATION MANAGEMENT | Facility: CLINIC | Age: 54
End: 2019-08-21

## 2019-08-26 ENCOUNTER — HOSPITAL ENCOUNTER (OUTPATIENT)
Dept: PHYSICAL THERAPY | Facility: HOSPITAL | Age: 54
Setting detail: THERAPIES SERIES
End: 2019-08-26
Attending: ORTHOPAEDIC SURGERY
Payer: COMMERCIAL

## 2019-08-26 PROCEDURE — 97110 THERAPEUTIC EXERCISES: CPT | Mod: GP

## 2019-08-26 PROCEDURE — 97140 MANUAL THERAPY 1/> REGIONS: CPT | Mod: GP

## 2019-08-26 PROCEDURE — 97161 PT EVAL LOW COMPLEX 20 MIN: CPT | Mod: GP

## 2019-08-26 NOTE — PROGRESS NOTES
08/26/19 0800   General Information   Type of Visit Initial OP Ortho PT Evaluation   Start of Care Date 08/26/19   Referring Physician Mikal torre PA-C   Orders Evaluate and Treat   Orders Comment Progress ROM as tolerated, RTC and scapular strength 4 weeks, modalities PRN, HEP   Medical Diagnosis R shoulder s/p scope with labral debridement and open subpectoral biceps tenodesis   Surgical/Medical history reviewed Yes   Body Part(s)   Body Part(s) Shoulder   Presentation and Etiology   Pertinent history of current problem (include personal factors and/or comorbidities that impact the POC) Pt presents to PT post-op shoulder surgery August 12th. Pt reports her shoulder was worn from years of playing softball. Reports since surgery her shoulder has been very sore. She has been out of her sling for about 1 week. Reports doctor told her not to use her bicep. Sleeping has been difficult and she has to sleep in a recliner. She has been icing a lot   Impairments A. Pain;B. Decreased WB tolerance;C. Swelling;E. Decreased flexibility;D. Decreased ROM;J. Burning;K. Numbness;L. Tingling;N. Headaches;O. Blurred vision   Functional Limitations perform activities of daily living   Symptom Location R shoulder    Onset date of current episode/exacerbation 08/12/19   Chronicity New   Pain rating (0-10 point scale) Best (/10);Worst (/10)   Best (/10) 3   Worst (/10) 9   Pain quality A. Sharp;B. Dull;C. Aching;D. Burning;H. Other  (Spasms)   Frequency of pain/symptoms A. Constant   Pain/symptoms are: Worse during the day   Pain/symptoms exacerbated by C. Lifting;D. Carrying;G. Certain positions;H. Overhead reach;K. Home tasks;L. Work tasks   Pain/symptoms eased by C. Rest;H. Cold   Progression of symptoms since onset: Improved   Current Level of Function   Patient role/employment history A. Employed   Employment Comments [employer]   Fall Risk Screen   Fall screen completed by PT   Have you fallen 2 or more times in the past  year? No   Have you fallen and had an injury in the past year? No   Is patient a fall risk? No   Shoulder Objective Findings   Side (if bilateral, select both right and left) Right   Posture Protracted shoulder, muscle guarding on R   Cervical Screen (ROM, quadrant) No issues observed   Palpation Muscle guarding/pain limited PROM assessment today   Right Shoulder Flexion AROM 80 degrees limited by pain   Right Shoulder Flexion PROM 90 degrees   Right Shoulder Abduction PROM 90 degrees   Right Shoulder ER PROM 50% impaired by pain   Right Shoulder IR PROM 50% impaired by pain   Right Shoulder Flexion Strength MMT not performed due to post-surgical status/pain   Planned Therapy Interventions   Planned Therapy Interventions joint mobilization;manual therapy;ROM;strengthening;stretching   Planned Modality Interventions   Planned Modality Interventions Cryotherapy;Electrical stimulation;Hot packs;Ultrasound;Iontophoresis   Planned Modality Interventions Comments If needed for pain   Clinical Impression   Criteria for Skilled Therapeutic Interventions Met yes, treatment indicated   PT Diagnosis R shoulder s/p scope with labral debridement and open subpectoral biceps tenodesis   Influenced by the following impairments Impaired R shoulder ROM, weakness, pain limiting function   Functional limitations due to impairments Decreased functional use of RUE due to pain, weakness, impaired ROM   Clinical Presentation Stable/Uncomplicated   Clinical Presentation Rationale Clinical judgement   Clinical Decision Making (Complexity) Low complexity   Therapy Frequency 2 times/Week   Predicted Duration of Therapy Intervention (days/wks) up to 90 days   Risk & Benefits of therapy have been explained Yes   Patient, Family & other staff in agreement with plan of care Yes   Clinical Impression Comments R shoulder pain, weakness post-op expected to improve with skilled PT services   ORTHO GOALS   PT Ortho Eval Goals 2;1;3   Ortho Goal 1    Goal Identifier STG 1   Goal Description Pt will demonstrate knowledge/understanding of HEP and report daily compliance   Target Date 09/09/19   Ortho Goal 2   Goal Identifier LTG 1   Goal Description Pt will demonstrate full, pain free AROM RUE in order to return to PLOF   Target Date 11/24/19   Ortho Goal 3   Goal Identifier LTG 2   Goal Description Pt will demonstrate normal 5/5 strength R shoulder in order to return to PLOF   Target Date 11/24/19   Total Evaluation Time   PT Eval, Low Complexity Minutes (46409) 15     I certify the need for these services furnished under this plan of treatment and while under my care. (Physician co-signature of this document indicates review and certification of the therapy plan).

## 2019-08-28 ENCOUNTER — HOSPITAL ENCOUNTER (OUTPATIENT)
Dept: PHYSICAL THERAPY | Facility: HOSPITAL | Age: 54
Setting detail: THERAPIES SERIES
End: 2019-08-28
Attending: FAMILY MEDICINE
Payer: COMMERCIAL

## 2019-08-28 PROCEDURE — 97140 MANUAL THERAPY 1/> REGIONS: CPT | Mod: GP

## 2019-08-28 PROCEDURE — 97110 THERAPEUTIC EXERCISES: CPT | Mod: GP

## 2019-08-30 ENCOUNTER — DOCUMENTATION ONLY (OUTPATIENT)
Dept: SLEEP MEDICINE | Facility: HOSPITAL | Age: 54
End: 2019-08-30

## 2019-08-30 NOTE — PROGRESS NOTES
ANNITA GUNN       Name: Debra Park MRN# [id number]   Age: 54 year old YOB: 1965     Stop Bang questionnaire completed with a score of >3 to allow for HST     Have you been told you snore loudly (louder than talking or loud enough to be heard through doors)? YES    Do you often feel tired, fatigued, or sleepy during the daytime? YES    Has anyone observed you stop breathing during your sleep? YES    Do you have or are you being treated for high blood pressure? NO    Is your BMI greater than 35? NO    Is your neck size circumference 16 inches or greater? NO    Are you over 50 years old? YES    Stop Bang Score (# of yes): 4
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
## 2019-08-31 NOTE — PROGRESS NOTES
"Chart review prior to sleep testing.    Patient Summary:  55 yo F with PMHx of GERD, depression, anxiety who is referred for fatigue and concern for sleep disordered breathing.    STOP-BANG score of 4.  [place] score of 18.  Weight 160 lbs.    Per questionnaire: \"Snore a lot, wake up a lot at night never feel rested\"    No prior sleep testing.    Yes to caffeine, 3+, but not near bed.  Yes for tobacco.    Sleep pattern:  Workdays.  In bed 10:30pm, asleep quickly, 4-5 awakenings and up for 2 hours, up at 6:15am.  Weekends.  [place] to 9am.  Napping.  1-3 days per week, 1 hours per nap.    No abnormal nocturnal behaviors, except bruxism.    Yes to AM headaches, snoring, gasping arousals, observed apnea.    SHx:  , works for [employer].    A/P:  1.)  Increased probability of SON with STOP-BANG score of 4.   - Would appear to be candidate for either home sleep testing or PSG.    Byron Reyna MD    "

## 2019-09-04 ENCOUNTER — HOSPITAL ENCOUNTER (OUTPATIENT)
Dept: PHYSICAL THERAPY | Facility: HOSPITAL | Age: 54
Setting detail: THERAPIES SERIES
End: 2019-09-04
Attending: FAMILY MEDICINE
Payer: COMMERCIAL

## 2019-09-04 PROCEDURE — 97110 THERAPEUTIC EXERCISES: CPT | Mod: GP

## 2019-09-04 PROCEDURE — 97140 MANUAL THERAPY 1/> REGIONS: CPT | Mod: GP

## 2019-09-06 ENCOUNTER — HOSPITAL ENCOUNTER (OUTPATIENT)
Dept: PHYSICAL THERAPY | Facility: HOSPITAL | Age: 54
Setting detail: THERAPIES SERIES
End: 2019-09-06
Attending: FAMILY MEDICINE
Payer: COMMERCIAL

## 2019-09-06 PROCEDURE — 97110 THERAPEUTIC EXERCISES: CPT | Mod: GP

## 2019-09-09 ENCOUNTER — HOSPITAL ENCOUNTER (OUTPATIENT)
Dept: PHYSICAL THERAPY | Facility: HOSPITAL | Age: 54
Setting detail: THERAPIES SERIES
End: 2019-09-09
Attending: FAMILY MEDICINE
Payer: COMMERCIAL

## 2019-09-09 PROCEDURE — 97110 THERAPEUTIC EXERCISES: CPT | Mod: GP

## 2019-09-16 ENCOUNTER — HOSPITAL ENCOUNTER (OUTPATIENT)
Dept: PHYSICAL THERAPY | Facility: HOSPITAL | Age: 54
Setting detail: THERAPIES SERIES
End: 2019-09-16
Attending: FAMILY MEDICINE
Payer: COMMERCIAL

## 2019-09-16 PROCEDURE — 97110 THERAPEUTIC EXERCISES: CPT | Mod: GP

## 2019-09-19 ENCOUNTER — HOSPITAL ENCOUNTER (OUTPATIENT)
Dept: PHYSICAL THERAPY | Facility: HOSPITAL | Age: 54
Setting detail: THERAPIES SERIES
End: 2019-09-19
Attending: FAMILY MEDICINE
Payer: COMMERCIAL

## 2019-09-19 PROCEDURE — 97110 THERAPEUTIC EXERCISES: CPT | Mod: GP

## 2019-09-23 ENCOUNTER — HOSPITAL ENCOUNTER (OUTPATIENT)
Dept: PHYSICAL THERAPY | Facility: HOSPITAL | Age: 54
Setting detail: THERAPIES SERIES
End: 2019-09-23
Attending: FAMILY MEDICINE
Payer: COMMERCIAL

## 2019-09-23 PROCEDURE — 97110 THERAPEUTIC EXERCISES: CPT | Mod: GP

## 2019-09-26 ENCOUNTER — HOSPITAL ENCOUNTER (OUTPATIENT)
Dept: PHYSICAL THERAPY | Facility: HOSPITAL | Age: 54
Setting detail: THERAPIES SERIES
End: 2019-09-26
Attending: FAMILY MEDICINE
Payer: COMMERCIAL

## 2019-09-26 PROCEDURE — 97110 THERAPEUTIC EXERCISES: CPT | Mod: GP

## 2019-09-30 ENCOUNTER — HOSPITAL ENCOUNTER (OUTPATIENT)
Dept: PHYSICAL THERAPY | Facility: HOSPITAL | Age: 54
Setting detail: THERAPIES SERIES
End: 2019-09-30
Attending: FAMILY MEDICINE
Payer: COMMERCIAL

## 2019-09-30 PROCEDURE — 97110 THERAPEUTIC EXERCISES: CPT | Mod: GP

## 2019-10-07 ENCOUNTER — HOSPITAL ENCOUNTER (OUTPATIENT)
Dept: PHYSICAL THERAPY | Facility: HOSPITAL | Age: 54
Setting detail: THERAPIES SERIES
End: 2019-10-07
Attending: FAMILY MEDICINE
Payer: COMMERCIAL

## 2019-10-07 PROCEDURE — 97140 MANUAL THERAPY 1/> REGIONS: CPT | Mod: GP

## 2019-10-07 PROCEDURE — 97110 THERAPEUTIC EXERCISES: CPT | Mod: GP

## 2019-10-10 ENCOUNTER — TELEPHONE (OUTPATIENT)
Dept: SLEEP MEDICINE | Facility: HOSPITAL | Age: 54
End: 2019-10-10

## 2019-10-10 DIAGNOSIS — G47.10 HYPERSOMNIA: Primary | ICD-10-CM

## 2019-10-10 DIAGNOSIS — R06.83 SNORING: ICD-10-CM

## 2019-10-10 DIAGNOSIS — R06.81 APNEA: ICD-10-CM

## 2019-10-23 ENCOUNTER — THERAPY VISIT (OUTPATIENT)
Dept: SLEEP MEDICINE | Facility: HOSPITAL | Age: 54
End: 2019-10-23
Attending: FAMILY MEDICINE
Payer: COMMERCIAL

## 2019-10-23 DIAGNOSIS — G47.10 HYPERSOMNIA: ICD-10-CM

## 2019-10-23 DIAGNOSIS — R06.81 APNEA: ICD-10-CM

## 2019-10-23 DIAGNOSIS — R06.83 SNORING: ICD-10-CM

## 2019-10-23 PROCEDURE — 95810 POLYSOM 6/> YRS 4/> PARAM: CPT | Mod: 26 | Performed by: FAMILY MEDICINE

## 2019-10-23 PROCEDURE — 95810 POLYSOM 6/> YRS 4/> PARAM: CPT

## 2019-10-24 NOTE — NURSING NOTE
STUDY TYPE/INSURANCE:  PSG/ PREFERRED ONE  SLEEP AID TYPE/TIME:  none  PAP ACCLIMATION:  5cmH2O during mask fitting  RESPIRATORY EVENTS (BASELINE):0  SNORING:intermittent, mostly loud to heroic  TCO2 MONITORING AND ABGS:  n/a  TITRATION:n/a  LEAK RATE:  n/a  HOB ELEVATION:flat  FINAL MASK TYPE/SIZE:    SLEEP STAGES:  1,2,3 and REM  ECG:  NSR  MOVEMENTS/BEHAVIORS:  normal  CURRENTLY ON TX OR HAVE EQUIPMENT:  No    She reports a normal night of sleep.

## 2019-11-03 NOTE — PROCEDURES
"Patient summary:  55 yo F with PMHx of GERD, depression, anxiety who is referred for fatigue and concern for sleep disordered breathing.     STOP-BANG score of 4.  [place] score of 18.  Weight 160 lbs.     Per questionnaire: \"Snore a lot, wake up a lot at night never feel rested\"    Interp for PSG dated 10/23/2019.    Weight 160 lbs.  Total sleep time 423 minutes, sleep efficiency 93%, sleep latency 16.5 minutes, REM latency 171.5 minutes.  Arousal index 5.2.  Sleep architecture was fairly well consolidated.  Supine REM was observed.    AHI 0, RDI ~5, events appearing primarily obstructive in nature.  Mean Spo2 92%, meenu SpO2 89%, 0% of recording was <= 89%.  EKG appeared NSR.  Rare PLM's observed (index 1.3).     Assessment:  - No significant sleep-disordered breathing observed.    Byron Reyna MD      "

## 2019-11-05 ENCOUNTER — OFFICE VISIT (OUTPATIENT)
Dept: SLEEP MEDICINE | Facility: HOSPITAL | Age: 54
End: 2019-11-05
Attending: FAMILY MEDICINE
Payer: COMMERCIAL

## 2019-11-05 VITALS
BODY MASS INDEX: 29.02 KG/M2 | SYSTOLIC BLOOD PRESSURE: 130 MMHG | HEIGHT: 64 IN | OXYGEN SATURATION: 97 % | WEIGHT: 170 LBS | HEART RATE: 76 BPM | DIASTOLIC BLOOD PRESSURE: 72 MMHG | RESPIRATION RATE: 12 BRPM

## 2019-11-05 DIAGNOSIS — F51.04 PSYCHOPHYSIOLOGICAL INSOMNIA: Primary | ICD-10-CM

## 2019-11-05 PROCEDURE — G0463 HOSPITAL OUTPT CLINIC VISIT: HCPCS

## 2019-11-05 PROCEDURE — 99203 OFFICE O/P NEW LOW 30 MIN: CPT | Performed by: FAMILY MEDICINE

## 2019-11-05 ASSESSMENT — MIFFLIN-ST. JEOR: SCORE: 1366.11

## 2019-11-05 NOTE — PATIENT INSTRUCTIONS
Here is our summary for today.    1.)  On the sleep study, we did hear snoring, but the number of airway collapses was in the normal range (which is up to 5 in adults per hour and your average was about 2-3 per hour) and oxygen level remained normal.    2.)  To see if it helps your sleep quality, we will try a medication called Amitriptyline 25mg to 50mg at bed time.    3.)  For the anti-snoring mouth guards, I usually recommend one of the following:    [employer] - [employer]  (the one I recommend the most often)  [employer] - www.[email or web address]    4.)  If you can give me an update by phone in a few weeks.

## 2019-11-11 NOTE — PROGRESS NOTES
"  Sleep Consultation:    Date on this visit: 11/5/2019    Debra Park is sent by Kavon Morales for a sleep consultation regarding concern for sleep-disordered breathing and to review sleep testing results.    Primary Physician: Kavon Morales     55 yo F with PMHx of GERD, depression, anxiety who is referred for fatigue and concern for sleep disordered breathing.     STOP-BANG score of 4.  [place] score of 18.  Weight 160 lbs.     Per questionnaire: \"Snore a lot, wake up a lot at night never feel rested\"     No prior sleep testing.     Yes to caffeine, 3+, but not near bed.  Yes for tobacco.     Sleep pattern:  Workdays.  In bed 10:30pm, asleep quickly, 4-5 awakenings and up for 2 hours, up at 6:15am.  Weekends.  [place] to 9am.  Napping.  1-3 days per week, 1 hours per nap.     No abnormal nocturnal behaviors, except bruxism.     Yes to AM headaches, snoring, gasping arousals, observed apnea.     SHx:  , works for [employer].    Interp for PSG dated 10/23/2019.     Weight 160 lbs.  Total sleep time 423 minutes, sleep efficiency 93%, sleep latency 16.5 minutes, REM latency 171.5 minutes.  Arousal index 5.2.  Sleep architecture was fairly well consolidated.  Supine REM was observed.     AHI 0, RDI ~5, events appearing primarily obstructive in nature.  Mean Spo2 92%, meenu SpO2 89%, 0% of recording was <= 89%.  EKG appeared NSR.  Rare PLM's observed (index 1.3).     Allergies:    Allergies   Allergen Reactions     Augmentin Nausea and Vomiting     Cats Swelling       Medications:    Current Outpatient Medications   Medication Sig Dispense Refill     albuterol (ALBUTEROL) 108 (90 BASE) MCG/ACT Inhaler Inhale 2 puffs into the lungs 4 times daily And PRN 1 Inhaler 0     amitriptyline (ELAVIL) 25 MG tablet Take 1-2 tablets by mouth at bed time.  Start with 1 tablet, ok to increase to 2 tablets in 1 week if no significant improvement. 60 tablet 3     citalopram (CELEXA) 20 MG tablet Take 20 mg by " mouth daily       ibuprofen (ADVIL/MOTRIN) 200 MG tablet Take 3 tablets (600 mg) by mouth every 6 hours as needed for pain (Patient taking differently: Take 400 mg by mouth every 6 hours as needed for pain ) 60 tablet 0     diclofenac (VOLTAREN) 75 MG EC tablet Take by mouth 2 times daily       gabapentin (NEURONTIN) 100 MG capsule Take 100 mg by mouth 3 times daily       HYDROcodone-acetaminophen (NORCO) 5-325 MG tablet Take 1 tablet by mouth every 4 hours as needed for pain (Patient not taking: Reported on 11/5/2019) 8 tablet 0       Problem List:  Patient Active Problem List    Diagnosis Date Noted     Facial cellulitis 08/29/2016     Priority: Medium        Past Medical/Surgical History:  Past Medical History:   Diagnosis Date     Anxiety      Depressive disorder      GERD (gastroesophageal reflux disease)      Past Surgical History:   Procedure Laterality Date     BREAST SURGERY      augmentation     CHOLECYSTECTOMY       ENT SURGERY      T&A     GYN SURGERY      endo ablation     ORTHOPEDIC SURGERY      rotator cuff repair     SOFT TISSUE SURGERY      basal cell ca       Social History:  Social History     Socioeconomic History     Marital status:      Spouse name: Not on file     Number of children: Not on file     Years of education: Not on file     Highest education level: Not on file   Occupational History     Not on file   Social Needs     Financial resource strain: Not on file     Food insecurity:     Worry: Not on file     Inability: Not on file     Transportation needs:     Medical: Not on file     Non-medical: Not on file   Tobacco Use     Smoking status: Current Every Day Smoker     Packs/day: 1.00     Years: 35.00     Pack years: 35.00     Smokeless tobacco: Never Used   Substance and Sexual Activity     Alcohol use: Yes     Comment: social     Drug use: No     Sexual activity: Yes     Partners: Male   Lifestyle     Physical activity:     Days per week: Not on file     Minutes per session:  "Not on file     Stress: Not on file   Relationships     Social connections:     Talks on phone: Not on file     Gets together: Not on file     Attends Shinto service: Not on file     Active member of club or organization: Not on file     Attends meetings of clubs or organizations: Not on file     Relationship status: Not on file     Intimate partner violence:     Fear of current or ex partner: Not on file     Emotionally abused: Not on file     Physically abused: Not on file     Forced sexual activity: Not on file   Other Topics Concern     Parent/sibling w/ CABG, MI or angioplasty before 65F 55M? Not Asked   Social History Narrative    ** Merged History Encounter **         p 10/14/2013.       Family History:  Family History   Problem Relation Age of Onset     Hypertension Mother      Hypertension Father      Diabetes Father      Hyperlipidemia Father        Review of Systems:  A complete review of systems reviewed by me is negative with the exeption of what has been mentioned in the history of present illness.      Physical Examination:  Vitals: /72   Pulse 76   Resp 12   Ht 1.626 m (5' 4\")   Wt 77.1 kg (170 lb)   SpO2 97%   BMI 29.18 kg/m    BMI= Body mass index is 29.18 kg/m .         No flowsheet data found.         GENERAL APPEARANCE: healthy, alert and no distress  RESP: lungs clear to auscultation - no rales, rhonchi or wheezes  CV: regular rates and rhythm, normal S1 S2, no S3 or S4 and no murmur, click or rub  PSYCH: mentation appears normal and affect normal/bright      Impression/Plan:    1.)  No significant sleep-disordered breathing seen on PSG  2.)  Chronic insomnia with primary complaint of poor sleep quality  3.)  Socially disruptive snoring   - Reviewed self-fit oral appliances for snoring   - Trial of amitriptyline 25-50mg PO at bedtime.   - Up date by phone in a few weeks.    I have spent 30 minutes with this patient today in which greater than 50% of this time was spent in the " counseling / coordination of care.      Byron Reyna MD    CC: Kavon Morales

## 2020-01-15 ENCOUNTER — APPOINTMENT (OUTPATIENT)
Dept: GENERAL RADIOLOGY | Facility: HOSPITAL | Age: 55
End: 2020-01-15
Attending: PHYSICIAN ASSISTANT
Payer: COMMERCIAL

## 2020-01-15 ENCOUNTER — HOSPITAL ENCOUNTER (EMERGENCY)
Facility: HOSPITAL | Age: 55
Discharge: HOME | End: 2020-01-15
Attending: PHYSICIAN ASSISTANT | Admitting: PHYSICIAN ASSISTANT
Payer: COMMERCIAL

## 2020-01-15 VITALS
DIASTOLIC BLOOD PRESSURE: 80 MMHG | RESPIRATION RATE: 12 BRPM | SYSTOLIC BLOOD PRESSURE: 113 MMHG | TEMPERATURE: 99.5 F | OXYGEN SATURATION: 95 %

## 2020-01-15 DIAGNOSIS — R07.89 ATYPICAL CHEST PAIN: ICD-10-CM

## 2020-01-15 DIAGNOSIS — J40 BRONCHITIS: ICD-10-CM

## 2020-01-15 DIAGNOSIS — Z72.0 TOBACCO ABUSE: ICD-10-CM

## 2020-01-15 LAB
ALBUMIN SERPL-MCNC: 3.7 G/DL (ref 3.4–5)
ALP SERPL-CCNC: 54 U/L (ref 40–150)
ALT SERPL W P-5'-P-CCNC: 36 U/L (ref 0–50)
ANION GAP SERPL CALCULATED.3IONS-SCNC: 5 MMOL/L (ref 3–14)
AST SERPL W P-5'-P-CCNC: 24 U/L (ref 0–45)
BASOPHILS # BLD AUTO: 0 10E9/L (ref 0–0.2)
BASOPHILS NFR BLD AUTO: 0.6 %
BILIRUB SERPL-MCNC: 0.2 MG/DL (ref 0.2–1.3)
BUN SERPL-MCNC: 13 MG/DL (ref 7–30)
CALCIUM SERPL-MCNC: 8.6 MG/DL (ref 8.5–10.1)
CHLORIDE SERPL-SCNC: 109 MMOL/L (ref 94–109)
CO2 SERPL-SCNC: 26 MMOL/L (ref 20–32)
CREAT SERPL-MCNC: 0.81 MG/DL (ref 0.52–1.04)
D DIMER PPP FEU-MCNC: 0.5 UG/ML FEU (ref 0–0.5)
DIFFERENTIAL METHOD BLD: NORMAL
EOSINOPHIL # BLD AUTO: 0.3 10E9/L (ref 0–0.7)
EOSINOPHIL NFR BLD AUTO: 4 %
ERYTHROCYTE [DISTWIDTH] IN BLOOD BY AUTOMATED COUNT: 12.8 % (ref 10–15)
GFR SERPL CREATININE-BSD FRML MDRD: 82 ML/MIN/{1.73_M2}
GLUCOSE SERPL-MCNC: 102 MG/DL (ref 70–99)
HCT VFR BLD AUTO: 41.5 % (ref 35–47)
HGB BLD-MCNC: 14.1 G/DL (ref 11.7–15.7)
IMM GRANULOCYTES # BLD: 0 10E9/L (ref 0–0.4)
IMM GRANULOCYTES NFR BLD: 0.1 %
LYMPHOCYTES # BLD AUTO: 1.9 10E9/L (ref 0.8–5.3)
LYMPHOCYTES NFR BLD AUTO: 26.6 %
MCH RBC QN AUTO: 31.1 PG (ref 26.5–33)
MCHC RBC AUTO-ENTMCNC: 34 G/DL (ref 31.5–36.5)
MCV RBC AUTO: 91 FL (ref 78–100)
MONOCYTES # BLD AUTO: 0.8 10E9/L (ref 0–1.3)
MONOCYTES NFR BLD AUTO: 10.6 %
NEUTROPHILS # BLD AUTO: 4.2 10E9/L (ref 1.6–8.3)
NEUTROPHILS NFR BLD AUTO: 58.1 %
NRBC # BLD AUTO: 0 10*3/UL
NRBC BLD AUTO-RTO: 0 /100
PLATELET # BLD AUTO: 231 10E9/L (ref 150–450)
POTASSIUM SERPL-SCNC: 3.8 MMOL/L (ref 3.4–5.3)
PROT SERPL-MCNC: 6.9 G/DL (ref 6.8–8.8)
RBC # BLD AUTO: 4.54 10E12/L (ref 3.8–5.2)
SODIUM SERPL-SCNC: 140 MMOL/L (ref 133–144)
TROPONIN I SERPL-MCNC: <0.015 UG/L (ref 0–0.04)
WBC # BLD AUTO: 7.2 10E9/L (ref 4–11)

## 2020-01-15 PROCEDURE — 80053 COMPREHEN METABOLIC PANEL: CPT | Performed by: PHYSICIAN ASSISTANT

## 2020-01-15 PROCEDURE — 85025 COMPLETE CBC W/AUTO DIFF WBC: CPT | Performed by: PHYSICIAN ASSISTANT

## 2020-01-15 PROCEDURE — 84484 ASSAY OF TROPONIN QUANT: CPT | Performed by: PHYSICIAN ASSISTANT

## 2020-01-15 PROCEDURE — 99285 EMERGENCY DEPT VISIT HI MDM: CPT | Mod: 25

## 2020-01-15 PROCEDURE — 93010 ELECTROCARDIOGRAM REPORT: CPT | Performed by: INTERNAL MEDICINE

## 2020-01-15 PROCEDURE — 94640 AIRWAY INHALATION TREATMENT: CPT

## 2020-01-15 PROCEDURE — 25000132 ZZH RX MED GY IP 250 OP 250 PS 637: Performed by: PHYSICIAN ASSISTANT

## 2020-01-15 PROCEDURE — 40000275 ZZH STATISTIC RCP TIME EA 10 MIN

## 2020-01-15 PROCEDURE — 93005 ELECTROCARDIOGRAM TRACING: CPT

## 2020-01-15 PROCEDURE — 71046 X-RAY EXAM CHEST 2 VIEWS: CPT | Mod: TC

## 2020-01-15 PROCEDURE — 99285 EMERGENCY DEPT VISIT HI MDM: CPT | Mod: Z6 | Performed by: PHYSICIAN ASSISTANT

## 2020-01-15 PROCEDURE — 85379 FIBRIN DEGRADATION QUANT: CPT | Performed by: PHYSICIAN ASSISTANT

## 2020-01-15 PROCEDURE — 25000125 ZZHC RX 250: Performed by: PHYSICIAN ASSISTANT

## 2020-01-15 PROCEDURE — 36415 COLL VENOUS BLD VENIPUNCTURE: CPT | Performed by: PHYSICIAN ASSISTANT

## 2020-01-15 RX ORDER — IPRATROPIUM BROMIDE AND ALBUTEROL SULFATE 2.5; .5 MG/3ML; MG/3ML
3 SOLUTION RESPIRATORY (INHALATION) ONCE
Status: COMPLETED | OUTPATIENT
Start: 2020-01-15 | End: 2020-01-15

## 2020-01-15 RX ORDER — ASPIRIN 81 MG/1
324 TABLET, CHEWABLE ORAL ONCE
Status: COMPLETED | OUTPATIENT
Start: 2020-01-15 | End: 2020-01-15

## 2020-01-15 RX ORDER — PREDNISONE 20 MG/1
TABLET ORAL
Qty: 10 TABLET | Refills: 0 | Status: SHIPPED | OUTPATIENT
Start: 2020-01-15 | End: 2021-04-30

## 2020-01-15 RX ADMIN — ASPIRIN 81 MG 324 MG: 81 TABLET ORAL at 11:27

## 2020-01-15 RX ADMIN — IPRATROPIUM BROMIDE AND ALBUTEROL SULFATE 3 ML: .5; 3 SOLUTION RESPIRATORY (INHALATION) at 11:28

## 2020-01-15 ASSESSMENT — ENCOUNTER SYMPTOMS
RHINORRHEA: 1
SORE THROAT: 0
VOMITING: 0
WHEEZING: 0
ACTIVITY CHANGE: 0
FEVER: 0
DIARRHEA: 0
FATIGUE: 1
LIGHT-HEADEDNESS: 0
COUGH: 1
BACK PAIN: 0
HEADACHES: 0
SHORTNESS OF BREATH: 1
SINUS PRESSURE: 1
DIZZINESS: 0
CHILLS: 1
NAUSEA: 0
ABDOMINAL PAIN: 1
CHEST TIGHTNESS: 1
APPETITE CHANGE: 0
SINUS PAIN: 1
PALPITATIONS: 0
BRUISES/BLEEDS EASILY: 0

## 2020-01-15 NOTE — DISCHARGE INSTRUCTIONS
Try and quite smoking.     Start prednisone.    Follow-up with Dr. Morales.     Return here for any worsening symptoms, new symptoms, or other concerns.

## 2020-01-15 NOTE — ED AVS SNAPSHOT
[facility]  [address]  [place] [address]  Phone:  [phone]                                    Debra Park   MRN: [id number]    Department:  [facility]   Date of Visit:  1/15/2020           After Visit Summary Signature Page    I have received my discharge instructions, and my questions have been answered. I have discussed any challenges I see with this plan with the nurse or doctor.    ..........................................................................................................................................  Patient/Patient Representative Signature      ..........................................................................................................................................  Patient Representative Print Name and Relationship to Patient    ..................................................               ................................................  Date                                   Time    ..........................................................................................................................................  Reviewed by Signature/Title    ...................................................              ..............................................  Date                                               Time          22EPIC Rev 08/18

## 2020-01-15 NOTE — ED TRIAGE NOTES
Patient presents with 2 week history of cough and cold symptoms and left sided chest wall pain.  States the pain hurts more with movement and deep breath/coughing.

## 2020-01-15 NOTE — ED PROVIDER NOTES
History     Chief Complaint   Patient presents with     URI     Chest Wall Pain     The history is provided by the patient.     Debra Park is a 54 year old female who presented to the emergency department ambulatory for evaluation of a 3-day history of anterior chest pressure/pain rated at approximately 6 on the 10 scale.  Worse with position and laying on the left side.  Worse with deep breath.  She does have a mild nonproductive cough.  Some sinus congestion that has improved.  Denies any fevers but does report some chills.  She does have some intermittent upper abdominal discomfort as well.  She smokes but has no history of coronary disease or stroke.  Denies any hemoptysis.  Denies any hematochezia melena, hematemesis.  Denies any lower urinary tract symptoms.    Allergies:  Allergies   Allergen Reactions     Augmentin Nausea and Vomiting     Cats Swelling       Problem List:    Patient Active Problem List    Diagnosis Date Noted     Facial cellulitis 08/29/2016     Priority: Medium        Past Medical History:    Past Medical History:   Diagnosis Date     Anxiety      Depressive disorder      GERD (gastroesophageal reflux disease)        Past Surgical History:    Past Surgical History:   Procedure Laterality Date     BREAST SURGERY      augmentation     CHOLECYSTECTOMY       ENT SURGERY      T&A     GYN SURGERY      endo ablation     ORTHOPEDIC SURGERY      rotator cuff repair     SOFT TISSUE SURGERY      basal cell ca       Family History:    Family History   Problem Relation Age of Onset     Hypertension Mother      Hypertension Father      Diabetes Father      Hyperlipidemia Father        Social History:  Marital Status:   [4]  Social History     Tobacco Use     Smoking status: Current Every Day Smoker     Packs/day: 1.00     Years: 35.00     Pack years: 35.00     Smokeless tobacco: Never Used   Substance Use Topics     Alcohol use: Yes     Comment: social     Drug use: No         Medications:    acetaminophen-codeine (TYLENOL #3) 300-30 MG tablet  citalopram (CELEXA) 20 MG tablet  predniSONE (DELTASONE) 20 MG tablet  albuterol (ALBUTEROL) 108 (90 BASE) MCG/ACT Inhaler  amitriptyline (ELAVIL) 25 MG tablet  diclofenac (VOLTAREN) 75 MG EC tablet  gabapentin (NEURONTIN) 100 MG capsule  HYDROcodone-acetaminophen (NORCO) 5-325 MG tablet  ibuprofen (ADVIL/MOTRIN) 200 MG tablet          Review of Systems   Constitutional: Positive for chills and fatigue. Negative for activity change, appetite change and fever.   HENT: Positive for congestion, rhinorrhea, sinus pressure and sinus pain. Negative for sore throat.    Respiratory: Positive for cough, chest tightness and shortness of breath. Negative for wheezing.    Cardiovascular: Positive for chest pain. Negative for palpitations and leg swelling.   Gastrointestinal: Positive for abdominal pain. Negative for diarrhea, nausea and vomiting.   Genitourinary: Negative.    Musculoskeletal: Negative for back pain.   Skin: Negative.    Neurological: Negative for dizziness, light-headedness and headaches.   Hematological: Does not bruise/bleed easily.       Physical Exam   BP: 139/89  Heart Rate: 73  Temp: 98.5  F (36.9  C)  Resp: 18  SpO2: 95 %      Physical Exam  Vitals signs and nursing note reviewed.   Constitutional:       General: She is not in acute distress.     Appearance: Normal appearance. She is normal weight. She is not ill-appearing, toxic-appearing or diaphoretic.      Comments: Pleasant and talkative.  Found semireclined in the exam bed in no distress.   HENT:      Mouth/Throat:      Mouth: Mucous membranes are moist.   Neck:      Musculoskeletal: Normal range of motion and neck supple.   Cardiovascular:      Rate and Rhythm: Normal rate and regular rhythm.      Pulses: Normal pulses.   Pulmonary:      Effort: Pulmonary effort is normal.      Breath sounds: Normal breath sounds.   Abdominal:      General: Abdomen is flat. There is no  distension.      Palpations: Abdomen is soft.      Tenderness: There is no abdominal tenderness. There is no guarding.   Musculoskeletal:         General: No swelling.      Right lower leg: No edema.      Left lower leg: No edema.   Skin:     General: Skin is warm and dry.      Capillary Refill: Capillary refill takes less than 2 seconds.   Neurological:      General: No focal deficit present.      Mental Status: She is alert and oriented to person, place, and time.   Psychiatric:         Mood and Affect: Mood normal.         ED Course        Procedures          EKG shows a normal sinus rhythm duration.  There is a slightly prolonged QTC of 494 ms.  Normal axis.  Normal P wave duration.  There are no concerning ST segments.  There are no concerning T waves.  There is no evidence of ectopy, preexcitation, or ischemia.  Comparison to previous EKG shows slight elongation of the QTC but otherwise no change.    Chest x-ray shows no evidence of focal consolidation, widened mediastinum, or pneumothorax.    Critical Care time:  none               Results for orders placed or performed during the hospital encounter of 01/15/20 (from the past 24 hour(s))   CBC with platelets differential   Result Value Ref Range    WBC 7.2 4.0 - 11.0 10e9/L    RBC Count 4.54 3.8 - 5.2 10e12/L    Hemoglobin 14.1 11.7 - 15.7 g/dL    Hematocrit 41.5 35.0 - 47.0 %    MCV 91 78 - 100 fl    MCH 31.1 26.5 - 33.0 pg    MCHC 34.0 31.5 - 36.5 g/dL    RDW 12.8 10.0 - 15.0 %    Platelet Count 231 150 - 450 10e9/L    Diff Method Automated Method     % Neutrophils 58.1 %    % Lymphocytes 26.6 %    % Monocytes 10.6 %    % Eosinophils 4.0 %    % Basophils 0.6 %    % Immature Granulocytes 0.1 %    Nucleated RBCs 0 0 /100    Absolute Neutrophil 4.2 1.6 - 8.3 10e9/L    Absolute Lymphocytes 1.9 0.8 - 5.3 10e9/L    Absolute Monocytes 0.8 0.0 - 1.3 10e9/L    Absolute Eosinophils 0.3 0.0 - 0.7 10e9/L    Absolute Basophils 0.0 0.0 - 0.2 10e9/L    Abs Immature  Granulocytes 0.0 0 - 0.4 10e9/L    Absolute Nucleated RBC 0.0    Comprehensive metabolic panel   Result Value Ref Range    Sodium 140 133 - 144 mmol/L    Potassium 3.8 3.4 - 5.3 mmol/L    Chloride 109 94 - 109 mmol/L    Carbon Dioxide 26 20 - 32 mmol/L    Anion Gap 5 3 - 14 mmol/L    Glucose 102 (H) 70 - 99 mg/dL    Urea Nitrogen 13 7 - 30 mg/dL    Creatinine 0.81 0.52 - 1.04 mg/dL    GFR Estimate 82 >60 mL/min/[1.73_m2]    GFR Estimate If Black >90 >60 mL/min/[1.73_m2]    Calcium 8.6 8.5 - 10.1 mg/dL    Bilirubin Total 0.2 0.2 - 1.3 mg/dL    Albumin 3.7 3.4 - 5.0 g/dL    Protein Total 6.9 6.8 - 8.8 g/dL    Alkaline Phosphatase 54 40 - 150 U/L    ALT 36 0 - 50 U/L    AST 24 0 - 45 U/L   Troponin I   Result Value Ref Range    Troponin I ES <0.015 0.000 - 0.045 ug/L   D dimer quantitative   Result Value Ref Range    D Dimer 0.5 0.0 - 0.50 ug/ml FEU   XR Chest 2 Views    Narrative    PROCEDURE: XR CHEST 2 VW 1/15/2020 12:06 PM    HISTORY: chest pain    COMPARISONS: None.    TECHNIQUE: 2 views.    FINDINGS: Heart and pulmonary vasculature are normal. No acute  infiltrate or effusion is seen.    Surgical clips are seen in the right upper quadrant.         Impression    IMPRESSION: No acute disease.    GINO RAY MD       Medications   aspirin (ASA) chewable tablet 324 mg (324 mg Oral Given 1/15/20 1127)   ipratropium - albuterol 0.5 mg/2.5 mg/3 mL (DUONEB) neb solution 3 mL (3 mLs Nebulization Given 1/15/20 1128)       HEART Score  Background  Calculates the overall risk of adverse event in patient's presenting with chest pain.  Based on 5 criteria (each assigned 0-2 points) including suspiciousness of history, EKG, age, risk factors and troponin.    Data  54 year old female  has Facial cellulitis on their problem list.   reports that she has been smoking. She has a 35.00 pack-year smoking history. She has never used smokeless tobacco.  family history includes Diabetes in her father; Hyperlipidemia in her father;  Hypertension in her father and mother.  Lab Results   Component Value Date    TROPI <0.015 01/15/2020     Criteria   0-2 points for each of 5 items (maximum of 10 points):  Score 0- History slightly suspicious for coronary syndrome  Score 0- EKG Normal  Score 1- Age 45 to 65 years old  Score 1- One to 2 risk factors for atherosclerotic disease  Score 0- Within normal limits for troponin levels  Interpretation  Risk of adverse outcome  Heart Score: 2  Total Score 0-3- Adverse Outcome Risk 2.5% - Supports early discharge with appropriate follow-up      Assessments & Plan (with Medical Decision Making)   Work-up as above.  Chest pressure worsens with position and cough.  Pressure has been present for at least 3 days continuously.  EKG is unremarkable.  Chest x-ray shows no focal consolidation or other concerns.  Troponin is negative.  Heart score is low.  No reasonable indication for antibiotics.  Given her smoking history and current symptoms I do believe that oral steroids would likely benefit.  Long detailed discussion with the patient.  Shared decision making.  She will follow-up in the clinic.  She is to return here for any worsening symptoms, new symptoms, diaphoresis, changes in pain quality location or character, or other concerns whatsoever.  HPI, exam, symptoms, and work-up is most consistent with musculoskeletal pain secondary to respiratory symptoms and tobacco abuse and not consistent with acute coronary syndrome, pulmonary embolism, thoracic aortic dissection, pneumothorax, pneumonia, pericarditis, pericardial effusion, esophageal rupture, or other intrathoracic catastrophe.    This document was prepared using a combination of typing and voice generated software.  While every attempt was made for accuracy, spelling and grammatical errors may exist.    I have reviewed the nursing notes.    I have reviewed the findings, diagnosis, plan and need for follow up with the patient.       New Prescriptions     PREDNISONE (DELTASONE) 20 MG TABLET    Take two tablets (= 40mg) each day for 5 (five) days       Final diagnoses:   Atypical chest pain   Bronchitis   Tobacco abuse       1/15/2020   [facility]     Tameka Cochran PA-C  01/15/20 [id number]

## 2020-01-21 ENCOUNTER — HOSPITAL ENCOUNTER (OUTPATIENT)
Dept: MRI IMAGING | Facility: HOSPITAL | Age: 55
Discharge: HOME | End: 2020-01-21
Attending: FAMILY MEDICINE | Admitting: FAMILY MEDICINE
Payer: COMMERCIAL

## 2020-01-21 DIAGNOSIS — M25.551 PAIN IN RIGHT HIP: ICD-10-CM

## 2020-01-21 PROCEDURE — 73721 MRI JNT OF LWR EXTRE W/O DYE: CPT | Mod: TC,RT

## 2020-02-05 ENCOUNTER — HOSPITAL ENCOUNTER (OUTPATIENT)
Dept: PHYSICAL THERAPY | Facility: HOSPITAL | Age: 55
Setting detail: THERAPIES SERIES
End: 2020-02-05
Attending: ORTHOPAEDIC SURGERY
Payer: COMMERCIAL

## 2020-02-05 PROCEDURE — 97161 PT EVAL LOW COMPLEX 20 MIN: CPT | Mod: GP

## 2020-02-05 PROCEDURE — 97110 THERAPEUTIC EXERCISES: CPT | Mod: GP

## 2020-02-05 PROCEDURE — 97140 MANUAL THERAPY 1/> REGIONS: CPT | Mod: GP

## 2020-02-05 NOTE — PROGRESS NOTES
Initial Physical Therapy Evaluation      Name: Debra Park MRN# [id number]   Age: 54 year old YOB: 1965     Date of Consultation: February 5, 2020  Primary care provider: Kavon Morales    Referring Physician: Dr. Montenegro   Orders: Eval and Treat  Medical Diagnosis: R Proximal Hamstring Tear  Onset of Illness/Injury: 2/5/20     Reason for PT Visit: Patient is a 55 y/o female who presents with R hamstring proximal tear from softball injury in the summer.  Patient is unable to run, unable to participate in sports, and pain gradually worsening and getting better at times dependent on activity of patient. No bruising when the initial injury happened, just a lot of pain.  Patient limps at time due to posterior hip pain which can cause additional back pain.  Pain is variable at time, limiting patient in recreational activities she would like to participate in.   Patient starts to limp after walking approximately 3 blocks.    Prior Level of Function: Patient is active, playing softball/volleyball likes to walk.  Walking dog daily.   Pain: 4/10  Aching and Shooting 8/10    Pain with coughing: no  Pain with sneezing: no  Pain with straining: no  Change in bowel/bladder: no  Numbness or tingling in groin area: no      Community Support/Living Environment/Employment History: patient works here, also works as a  where she is on her feet for 5 hours      Patient/Family Goal: to have less pain w/ work, standing or walking extended periods     Fall Screen:   Have you fallen 2 or more times in the last year? No  Have you fallen and had an injury in the last year? No  Timed up & go: n/a  Is patient a fall risk? No    Past Medical History:   Past Medical History:   Diagnosis Date     Anxiety      Depressive disorder      GERD (gastroesophageal reflux disease)        Past Surgical History:  Past Surgical History:   Procedure Laterality Date     BREAST SURGERY      augmentation     CHOLECYSTECTOMY    "    ENT SURGERY      T&A     GYN SURGERY      endo ablation     ORTHOPEDIC SURGERY      rotator cuff repair     SOFT TISSUE SURGERY      basal cell ca       Medications:   Current Outpatient Medications   Medication Sig     acetaminophen-codeine (TYLENOL #3) 300-30 MG tablet Take 1 tablet by mouth every 6 hours as needed for severe pain     albuterol (ALBUTEROL) 108 (90 BASE) MCG/ACT Inhaler Inhale 2 puffs into the lungs 4 times daily And PRN     amitriptyline (ELAVIL) 25 MG tablet Take 1-2 tablets by mouth at bed time.  Start with 1 tablet, ok to increase to 2 tablets in 1 week if no significant improvement.     citalopram (CELEXA) 20 MG tablet Take 20 mg by mouth daily     diclofenac (VOLTAREN) 75 MG EC tablet Take by mouth 2 times daily     gabapentin (NEURONTIN) 100 MG capsule Take 100 mg by mouth 3 times daily     HYDROcodone-acetaminophen (NORCO) 5-325 MG tablet Take 1 tablet by mouth every 4 hours as needed for pain (Patient not taking: Reported on 11/5/2019)     ibuprofen (ADVIL/MOTRIN) 200 MG tablet Take 3 tablets (600 mg) by mouth every 6 hours as needed for pain (Patient taking differently: Take 400 mg by mouth every 6 hours as needed for pain )     predniSONE (DELTASONE) 20 MG tablet Take two tablets (= 40mg) each day for 5 (five) days     No current facility-administered medications for this encounter.        Imaging:     Musculoskeletal Findings:     OBJECTIVE   Imaging: R Proximal Hamstring tear     Patient goals: \"to return to walking and being more active.\"    OBJECTIVE    Observation:   Palpation: Tenderness over GT on R side w/ deep pressure    Gait: normal   Assistive devices:     Functional mobility:  Posture: Normal erect.  Sitting Posture:   Standing Posture:  Correction of posture:     Neurological Assessment:  Reflexes - Right:  Patellar: 2+  Achilles: 2+    Reflexes - Left:  Patellar: 2+  Achilles: 2+    Myotomes:  Right lower extremity: neg  Left lower extremity: neg    Dermatomes:  Right " lower extremity: neg  Left lower extremity: neg    Range of Motion:  Active Lumbar Spine:       Flexion: WNL       Extension: WNL       Right sidebend: WNL       Left sidebend: WNL       Right rotation: WNL       Left rotation: WNL    Strength:  Abdominal Strength: 3/5   Right Lower Extremity Strength: 5/5 except Knee Flex 4/5, Hip abd 4/5, Hip ext 4/5  Left Lower Extremity Strength: 5/5    Special Tests:    Spine Special Tests:  Slump:    Left: neg              Right: Pos  Straight Leg Raise:    Left: neg      Right: Pos  Traction:   Prone Knee Bend:     Left:       Right:   Compression:    Left:       Right:  Repeated Movement Testing:    Passive Intervertebral Accessory Movements:     Prone Instability Test:       Hip Special Testss  Poppy:  neg  RHEA:  neg             Scour Test:    neg                       Byron Test:                      Leg Length:  equal                        Trendelenburg s Sign:      Ely's Test:                  HS 90/90 R 55*       L 70*  Hip ROM:  L LE: WNL    R LE: WNL    Hip Joint Accessory Motion/Glides:    Patient's Concordant Sign: walking, standing extended periods, running, squatting, pain reproduced w/ HS contraction R side     Outcome Measures:   LEFS - 29/80     Prognosis/Plan of Care: Patient has a good prognosis   Appropriate for Physical Therapy Intervention: Yes        GOALS:   Short-term goals:  To be achieved in 2-4 weeks:    Instruct in home program  1.) Patient will understand biomechanical stressors of the hip joint in order to make modifications to activities to reduce further risk of injury.  2.) Patient will be independent with a short-term home exercise program.  3.) Improve knee flexion, hip extension and abduction strength to 5/5    Long-term goals:  To be achieved in 8-10 weeks:    Return to previous level of function  Self management of symptoms.  Pain free activities of daily living.  1.) Patient will be able to squat and kneel repetitively in order to  perform chores and play w/ grandchildren.  2.) Patient will be able to negotiate a flight of stairs with reciprocal pattern independently.  3.) Patient will report 50% improvement in overall symptoms  4.) Patient will be able to tolerate standing 4-5 hours w/ moving around consistently to perform work shifts w/o pain.     Planned Interventions: Therapeutic exercise, manual therapy, therapeutic activity, patient education, Dry Needling, Iontophoresis    Patient presents today with signs and symptoms consistent of R Hamstring strain, w/ preserved strength though painful contraction against resistance.  Will try conservative measures to improve strength and hamstring neurodynamics. Therapy today consisted of evaluation, patient education, and therapeutic exercise. Patient would benefit from continued PT sessions addressing overall pain and dysfunction with use of appropriate interventions.      Clinical Impressions:  Criteria for Skilled Therapeutic Intervention Met: Yes  PT Diagnosis: R Hamstring partial tear  Influenced by the following impairments: R Hip pain, Decreased R LE strength and mobility  Functional limitations due to impairment: walking, standing extended periods, squatting, kneeling  Clinical presentation: Stable/Uncomplicated  Clinical presentation rationale: therapist discretion  Clinical Decision making (complexity): Low Complexity  Predicted Duration of Therapy Intervention (days/wks): 2x  Risks and Benefits of therapy have been explained: Yes  Patient, Family & other staff in agreement with plan of care: Yes  Comments: n/a  Frequency: 2x times/week  Date Range: 2/5/20 to 5/2/20    Total Evaluation Time: 15

## 2020-02-10 ENCOUNTER — HOSPITAL ENCOUNTER (OUTPATIENT)
Dept: PHYSICAL THERAPY | Facility: HOSPITAL | Age: 55
Setting detail: THERAPIES SERIES
End: 2020-02-10
Attending: FAMILY MEDICINE
Payer: COMMERCIAL

## 2020-02-10 PROCEDURE — 97140 MANUAL THERAPY 1/> REGIONS: CPT | Mod: GP

## 2020-02-10 PROCEDURE — 97110 THERAPEUTIC EXERCISES: CPT | Mod: GP

## 2020-02-13 ENCOUNTER — HOSPITAL ENCOUNTER (OUTPATIENT)
Dept: PHYSICAL THERAPY | Facility: HOSPITAL | Age: 55
Setting detail: THERAPIES SERIES
End: 2020-02-13
Attending: FAMILY MEDICINE
Payer: COMMERCIAL

## 2020-02-13 PROCEDURE — 97110 THERAPEUTIC EXERCISES: CPT | Mod: GP

## 2020-02-13 PROCEDURE — 97140 MANUAL THERAPY 1/> REGIONS: CPT | Mod: GP

## 2020-02-20 ENCOUNTER — HOSPITAL ENCOUNTER (OUTPATIENT)
Dept: PHYSICAL THERAPY | Facility: HOSPITAL | Age: 55
Setting detail: THERAPIES SERIES
End: 2020-02-20
Attending: FAMILY MEDICINE
Payer: COMMERCIAL

## 2020-02-20 PROCEDURE — 97110 THERAPEUTIC EXERCISES: CPT | Mod: GP

## 2020-02-20 PROCEDURE — 97140 MANUAL THERAPY 1/> REGIONS: CPT | Mod: GP

## 2020-02-24 ENCOUNTER — HOSPITAL ENCOUNTER (OUTPATIENT)
Dept: PHYSICAL THERAPY | Facility: HOSPITAL | Age: 55
Setting detail: THERAPIES SERIES
End: 2020-02-24
Attending: FAMILY MEDICINE
Payer: COMMERCIAL

## 2020-02-24 PROCEDURE — 97110 THERAPEUTIC EXERCISES: CPT | Mod: GP

## 2020-02-24 PROCEDURE — 97140 MANUAL THERAPY 1/> REGIONS: CPT | Mod: GP

## 2020-03-04 ENCOUNTER — HOSPITAL ENCOUNTER (OUTPATIENT)
Dept: PHYSICAL THERAPY | Facility: HOSPITAL | Age: 55
Setting detail: THERAPIES SERIES
End: 2020-03-04
Attending: ORTHOPAEDIC SURGERY
Payer: COMMERCIAL

## 2020-03-04 PROCEDURE — 97140 MANUAL THERAPY 1/> REGIONS: CPT | Mod: GP

## 2020-03-04 PROCEDURE — 97110 THERAPEUTIC EXERCISES: CPT | Mod: GP

## 2020-03-06 ENCOUNTER — HOSPITAL ENCOUNTER (OUTPATIENT)
Dept: PHYSICAL THERAPY | Facility: HOSPITAL | Age: 55
Setting detail: THERAPIES SERIES
End: 2020-03-06
Attending: ORTHOPAEDIC SURGERY
Payer: COMMERCIAL

## 2020-03-06 PROCEDURE — 97110 THERAPEUTIC EXERCISES: CPT | Mod: GP

## 2020-05-12 ENCOUNTER — RESULTS ONLY (OUTPATIENT)
Dept: LAB | Age: 55
End: 2020-05-12

## 2020-05-12 DIAGNOSIS — R05.9 COUGH: Primary | ICD-10-CM

## 2020-05-13 LAB
SARS-COV-2 RNA SPEC QL NAA+PROBE: NOT DETECTED
SPECIMEN SOURCE: NORMAL

## 2020-10-20 ENCOUNTER — RESULTS ONLY (OUTPATIENT)
Dept: LAB | Age: 55
End: 2020-10-20

## 2020-10-20 LAB
SARS-COV-2 RNA SPEC QL NAA+PROBE: NORMAL
SPECIMEN SOURCE: NORMAL

## 2020-10-21 LAB
LABORATORY COMMENT REPORT: NORMAL
SARS-COV-2 RNA SPEC QL NAA+PROBE: NEGATIVE
SPECIMEN SOURCE: NORMAL

## 2021-04-09 ENCOUNTER — HOSPITAL ENCOUNTER (OUTPATIENT)
Dept: PHYSICAL THERAPY | Facility: HOSPITAL | Age: 56
Setting detail: THERAPIES SERIES
End: 2021-04-09
Attending: FAMILY MEDICINE
Payer: COMMERCIAL

## 2021-04-09 PROCEDURE — 97161 PT EVAL LOW COMPLEX 20 MIN: CPT | Mod: GP

## 2021-04-09 PROCEDURE — 97110 THERAPEUTIC EXERCISES: CPT | Mod: GP

## 2021-04-09 ASSESSMENT — ACTIVITIES OF DAILY LIVING (ADL)
TWISTING/PIVOTING_ON_INVOLVED_LEG: EXTREME DIFFICULTY
DEEP_SQUATTING: MODERATE DIFFICULTY
GOING_UP_1_FLIGHT_OF_STAIRS: MODERATE DIFFICULTY
WALKING_APPROXIMATELY_10_MINUTES: EXTREME DIFFICULTY
WALKING_UP_STEEP_HILLS: EXTREME DIFFICULTY
SITTING_FOR_15_MINUTES: EXTREME DIFFICULTY
RECREATIONAL_ACTIVITIES: EXTREME DIFFICULTY
WALKING_15_MINUTES_OR_GREATER: EXTREME DIFFICULTY
GOING_DOWN_1_FLIGHT_OF_STAIRS: SLIGHT DIFFICULTY
STEPPING_UP_AND_DOWN_CURBS: SLIGHT DIFFICULTY
WALKING_INITIALLY: EXTREME DIFFICULTY
HOS_ADL_ITEM_SCORE_TOTAL: 25
HEAVY_WORK: EXTREME DIFFICULTY
GETTING_INTO_AND_OUT_OF_A_BATHTUB: MODERATE DIFFICULTY
ROLLING_OVER_IN_BED: EXTREME DIFFICULTY
PUTTING_ON_SOCKS_AND_SHOES: MODERATE DIFFICULTY
HOS_ADL_SCORE(%): 36.76
GETTING_INTO_AND_OUT_OF_AN_AVERAGE_CAR: EXTREME DIFFICULTY
HOS_ADL_COUNT: 17
STANDING_FOR_15_MINUTES: EXTREME DIFFICULTY
WALKING_DOWN_STEEP_HILLS: EXTREME DIFFICULTY
LIGHT_TO_MODERATE_WORK: MODERATE DIFFICULTY
HOS_ADL_HIGHEST_POTENTIAL_SCORE: 68

## 2021-04-09 NOTE — PROGRESS NOTES
Initial Physical Therapy Evaluation      Name: Debra Park MRN# [id number]   Age: 56 year old YOB: 1965     Date of Consultation: April 9, 2021  Primary care provider: Kavon Morales    Referring Physician: Kavon Morales L DO  Orders: Eval and Treat  Medical Diagnosis: L Shoulder Pain, sacrococcygeal pain  Onset of Illness/Injury: 4/9/2021     Reason for PT Visit: Patient is a 55 y/o female who presents with L hip pain and L shoulder pain.  Patient had a fall down the stairs carrying a bucket landing on her L shoulder and buttocks.  The fall occurred in November, and as of recent it is just starting to feel better.  Patient was not doing any therapy or stretches, just modifying seating position.      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 No  Have you fallen and had an injury in the last year? Yes  Timed up & go: n/a  Is patient a fall risk? No    Past Medical History:   Past Medical History:   Diagnosis Date     Anxiety      Depressive disorder      GERD (gastroesophageal reflux disease)        Past Surgical History:  Past Surgical History:   Procedure Laterality Date     BREAST SURGERY      augmentation     CHOLECYSTECTOMY       ENT SURGERY      T&A     GYN SURGERY      endo ablation     ORTHOPEDIC SURGERY      rotator cuff repair     SOFT TISSUE SURGERY      basal cell ca       Medications:   Current Outpatient Medications   Medication Sig     acetaminophen-codeine (TYLENOL #3) 300-30 MG tablet Take 1 tablet by mouth every 6 hours as needed for severe pain     albuterol (ALBUTEROL) 108 (90 BASE) MCG/ACT Inhaler Inhale 2 puffs into the lungs 4 times daily And PRN     amitriptyline (ELAVIL) 25 MG tablet Take 1-2 tablets by mouth at bed  time.  Start with 1 tablet, ok to increase to 2 tablets in 1 week if no significant improvement.     citalopram (CELEXA) 20 MG tablet Take 20 mg by mouth daily     diclofenac (VOLTAREN) 75 MG EC tablet Take by mouth 2 times daily     gabapentin (NEURONTIN) 100 MG capsule Take 100 mg by mouth 3 times daily     HYDROcodone-acetaminophen (NORCO) 5-325 MG tablet Take 1 tablet by mouth every 4 hours as needed for pain (Patient not taking: Reported on 11/5/2019)     ibuprofen (ADVIL/MOTRIN) 200 MG tablet Take 3 tablets (600 mg) by mouth every 6 hours as needed for pain (Patient taking differently: Take 400 mg by mouth every 6 hours as needed for pain )     predniSONE (DELTASONE) 20 MG tablet Take two tablets (= 40mg) each day for 5 (five) days     No current facility-administered medications for this encounter.        Imaging:     Musculoskeletal Findings:     OBJECTIVE   Observation: Patient appears to PT in to acute distress  Palpation: Tenderness at glute med and L shoulder AC joint   Functional mobility     Posture: forward head, protracted shoulders  Sitting Posture: Good   Standing Posture: Good    Neurological Assessment:   Reflexes - Right:  Biceps:   Triceps:      Reflexes - Left:  Biceps:    Triceps:      Myotomes:  Right upper extremity: neg   Left upper extremity: neg    Dermatomes:   Right upper extremity: neg  Left upper extremity: neg    Range of Motion/Strength:   Cervical range of motion: within normal limits    Shoulder Range of Motion and Strength    RIGHT Shoulder ROM (Active)  Flexion: WNL  Abduction: WNL   Internal Rotation: WNL  Functional IR:    External Rotation: WNL  Functional ER:      LEFT Shoulder ROM (Active)  Flexion: WNL - painful end range   Abduction: WNL - painful end range  Internal Rotation: WNL  Functional IR:    External Rotation: WNL  Functional ER:    Bilateral elbow, wrist, and hand range of motion and strength within normal limits.  Lower and middle trapezius strength /5  bilaterally.    STRENGTH                                                 Right                       Left  Flexion:                              5/5                         4+/5  Extension:                          5/5                         5/5  IR:                                      5/5                         5/5  ER:                                     5/5                         5/5  Abduction:                         4+/5                         4+/5      Shoulder Special Tests:  Neer s: neg   Heard Sky:  neg  Coracoid:   Crossarm: pos for pain at the AC joint   Haley s:   Drop-Arm: neg  Speeds:   Empty Can: neg  Yergason:   Crank: neg  External Rotation Lag Sign:   Sulcus Sign at 0 degrees:   Sulcus Sign at 90 degrees:       Functional Outcomes:   SPADI: 80.77  HOS: 36.76  Goals: 4-8 Weeks  1. Patient will be consistent and compliant in HEP.  2. Patient will demonstrate OH reaching w/ full ROM w/o increase in shoulder pain on L side.  3. Patient will be able to walk, perform stairs, and sit for extended periods w/o increase in L glute pain.    Prognosis/Plan of Care: Patient has a good prognosis based on recent improvement   Appropriate for Physical Therapy Intervention: Yes       Planned Interventions: Therapeutic exercise, manual therapy, therapeutic activity, patient education    Clinical Impressions:  Criteria for Skilled Therapeutic Intervention Met: Yes  PT Diagnosis: L Shoulder Pain, L Hip Pain  Influenced by the following impairments: L hip pain, L Shoulder Pain  Functional limitations due to impairment: sitting, reaching overhead  Clinical presentation: Stable/Uncomplicated  Clinical presentation rationale: therapist discretion  Clinical Decision making (complexity): Low Complexity  Predicted Duration of Therapy Intervention (days/wks): 2x  Risks and Benefits of therapy have been explained: Yes  Patient, Family & other staff in agreement with plan of care: Yes  Comments: Patient had a fall  on some stairs results in L arm going behind her while holding a bucket as well as dropping hard onto L posterior hip around piriformis muscle.  Likely had a significant contusion at piriformis and probable AC joint sprain of shoulder based on presentation and symptoms above.  Would benefit from PT to restore normal shoulder motion w/o pain and improve ability to sit extended periods.    Frequency: 2x times/week  Date Range: 4/9/2021  to 7/8/21       Total Evaluation Time: 15

## 2021-04-15 ENCOUNTER — HOSPITAL ENCOUNTER (OUTPATIENT)
Dept: PHYSICAL THERAPY | Facility: HOSPITAL | Age: 56
Setting detail: THERAPIES SERIES
End: 2021-04-15
Attending: FAMILY MEDICINE
Payer: COMMERCIAL

## 2021-04-15 PROCEDURE — 97110 THERAPEUTIC EXERCISES: CPT | Mod: GP

## 2021-04-15 PROCEDURE — 97140 MANUAL THERAPY 1/> REGIONS: CPT | Mod: GP

## 2021-04-30 ENCOUNTER — HOSPITAL ENCOUNTER (EMERGENCY)
Dept: ULTRASOUND IMAGING | Facility: HOSPITAL | Age: 56
End: 2021-04-30
Attending: EMERGENCY MEDICINE
Payer: COMMERCIAL

## 2021-04-30 ENCOUNTER — APPOINTMENT (OUTPATIENT)
Dept: GENERAL RADIOLOGY | Facility: HOSPITAL | Age: 56
End: 2021-04-30
Attending: EMERGENCY MEDICINE
Payer: COMMERCIAL

## 2021-04-30 ENCOUNTER — APPOINTMENT (OUTPATIENT)
Dept: CT IMAGING | Facility: HOSPITAL | Age: 56
End: 2021-04-30
Attending: EMERGENCY MEDICINE
Payer: COMMERCIAL

## 2021-04-30 ENCOUNTER — HOSPITAL ENCOUNTER (EMERGENCY)
Facility: HOSPITAL | Age: 56
Discharge: HOME | End: 2021-05-01
Attending: EMERGENCY MEDICINE | Admitting: EMERGENCY MEDICINE
Payer: COMMERCIAL

## 2021-04-30 DIAGNOSIS — R10.13 EPIGASTRIC PAIN: ICD-10-CM

## 2021-04-30 DIAGNOSIS — R19.7 DIARRHEA, UNSPECIFIED TYPE: ICD-10-CM

## 2021-04-30 LAB
ALBUMIN SERPL-MCNC: 4.1 G/DL (ref 3.4–5)
ALP SERPL-CCNC: 54 U/L (ref 40–150)
ALT SERPL W P-5'-P-CCNC: 38 U/L (ref 0–50)
ANION GAP SERPL CALCULATED.3IONS-SCNC: 7 MMOL/L (ref 3–14)
AST SERPL W P-5'-P-CCNC: 26 U/L (ref 0–45)
BASE DEFICIT BLDV-SCNC: 0.5 MMOL/L
BASOPHILS # BLD AUTO: 0.1 10E9/L (ref 0–0.2)
BASOPHILS NFR BLD AUTO: 0.9 %
BILIRUB SERPL-MCNC: 0.2 MG/DL (ref 0.2–1.3)
BUN SERPL-MCNC: 10 MG/DL (ref 7–30)
CALCIUM SERPL-MCNC: 8.8 MG/DL (ref 8.5–10.1)
CHLORIDE SERPL-SCNC: 106 MMOL/L (ref 94–109)
CO2 SERPL-SCNC: 25 MMOL/L (ref 20–32)
CREAT SERPL-MCNC: 0.87 MG/DL (ref 0.52–1.04)
D DIMER PPP FEU-MCNC: <0.3 UG/ML FEU (ref 0–0.5)
DIFFERENTIAL METHOD BLD: NORMAL
EOSINOPHIL # BLD AUTO: 0.3 10E9/L (ref 0–0.7)
EOSINOPHIL NFR BLD AUTO: 3.8 %
ERYTHROCYTE [DISTWIDTH] IN BLOOD BY AUTOMATED COUNT: 12.3 % (ref 10–15)
GFR SERPL CREATININE-BSD FRML MDRD: 75 ML/MIN/{1.73_M2}
GLUCOSE SERPL-MCNC: 110 MG/DL (ref 70–99)
HCO3 BLDV-SCNC: 26 MMOL/L (ref 21–28)
HCT VFR BLD AUTO: 40.8 % (ref 35–47)
HGB BLD-MCNC: 13.6 G/DL (ref 11.7–15.7)
IMM GRANULOCYTES # BLD: 0 10E9/L (ref 0–0.4)
IMM GRANULOCYTES NFR BLD: 0.3 %
LACTATE BLD-SCNC: 0.7 MMOL/L (ref 0.7–2)
LACTATE BLD-SCNC: 2.2 MMOL/L (ref 0.7–2)
LIPASE SERPL-CCNC: 72 U/L (ref 73–393)
LYMPHOCYTES # BLD AUTO: 2.7 10E9/L (ref 0.8–5.3)
LYMPHOCYTES NFR BLD AUTO: 31.3 %
MCH RBC QN AUTO: 31 PG (ref 26.5–33)
MCHC RBC AUTO-ENTMCNC: 33.3 G/DL (ref 31.5–36.5)
MCV RBC AUTO: 93 FL (ref 78–100)
MONOCYTES # BLD AUTO: 0.7 10E9/L (ref 0–1.3)
MONOCYTES NFR BLD AUTO: 8.6 %
NEUTROPHILS # BLD AUTO: 4.8 10E9/L (ref 1.6–8.3)
NEUTROPHILS NFR BLD AUTO: 55.1 %
NRBC # BLD AUTO: 0 10*3/UL
NRBC BLD AUTO-RTO: 0 /100
NT-PROBNP SERPL-MCNC: 45 PG/ML (ref 0–900)
O2/TOTAL GAS SETTING VFR VENT: ABNORMAL %
OXYHGB MFR BLDV: 80 %
PCO2 BLDV: 47 MM HG (ref 40–50)
PH BLDV: 7.35 PH (ref 7.32–7.43)
PLATELET # BLD AUTO: 265 10E9/L (ref 150–450)
PO2 BLDV: 53 MM HG (ref 25–47)
POTASSIUM SERPL-SCNC: 3.2 MMOL/L (ref 3.4–5.3)
PROT SERPL-MCNC: 7.3 G/DL (ref 6.8–8.8)
RBC # BLD AUTO: 4.39 10E12/L (ref 3.8–5.2)
SODIUM SERPL-SCNC: 138 MMOL/L (ref 133–144)
TROPONIN I SERPL-MCNC: <0.015 UG/L (ref 0–0.04)
TROPONIN I SERPL-MCNC: <0.015 UG/L (ref 0–0.04)
WBC # BLD AUTO: 8.6 10E9/L (ref 4–11)

## 2021-04-30 PROCEDURE — 93010 ELECTROCARDIOGRAM REPORT: CPT | Performed by: INTERNAL MEDICINE

## 2021-04-30 PROCEDURE — 85025 COMPLETE CBC W/AUTO DIFF WBC: CPT | Performed by: EMERGENCY MEDICINE

## 2021-04-30 PROCEDURE — 83605 ASSAY OF LACTIC ACID: CPT | Performed by: EMERGENCY MEDICINE

## 2021-04-30 PROCEDURE — 258N000003 HC RX IP 258 OP 636: Performed by: EMERGENCY MEDICINE

## 2021-04-30 PROCEDURE — 71275 CT ANGIOGRAPHY CHEST: CPT

## 2021-04-30 PROCEDURE — 96365 THER/PROPH/DIAG IV INF INIT: CPT

## 2021-04-30 PROCEDURE — 93005 ELECTROCARDIOGRAM TRACING: CPT

## 2021-04-30 PROCEDURE — 250N000011 HC RX IP 250 OP 636: Performed by: EMERGENCY MEDICINE

## 2021-04-30 PROCEDURE — 99285 EMERGENCY DEPT VISIT HI MDM: CPT | Mod: 25 | Performed by: EMERGENCY MEDICINE

## 2021-04-30 PROCEDURE — 76775 US EXAM ABDO BACK WALL LIM: CPT | Mod: TC | Performed by: EMERGENCY MEDICINE

## 2021-04-30 PROCEDURE — 71045 X-RAY EXAM CHEST 1 VIEW: CPT

## 2021-04-30 PROCEDURE — 80053 COMPREHEN METABOLIC PANEL: CPT | Performed by: EMERGENCY MEDICINE

## 2021-04-30 PROCEDURE — 96375 TX/PRO/DX INJ NEW DRUG ADDON: CPT | Mod: XU

## 2021-04-30 PROCEDURE — 99285 EMERGENCY DEPT VISIT HI MDM: CPT | Mod: 25

## 2021-04-30 PROCEDURE — 76775 US EXAM ABDO BACK WALL LIM: CPT | Mod: 26 | Performed by: EMERGENCY MEDICINE

## 2021-04-30 PROCEDURE — 93308 TTE F-UP OR LMTD: CPT | Mod: TC

## 2021-04-30 PROCEDURE — 250N000013 HC RX MED GY IP 250 OP 250 PS 637: Performed by: EMERGENCY MEDICINE

## 2021-04-30 PROCEDURE — 83880 ASSAY OF NATRIURETIC PEPTIDE: CPT | Performed by: EMERGENCY MEDICINE

## 2021-04-30 PROCEDURE — 85379 FIBRIN DEGRADATION QUANT: CPT | Performed by: EMERGENCY MEDICINE

## 2021-04-30 PROCEDURE — 84484 ASSAY OF TROPONIN QUANT: CPT | Performed by: EMERGENCY MEDICINE

## 2021-04-30 PROCEDURE — 82805 BLOOD GASES W/O2 SATURATION: CPT | Performed by: EMERGENCY MEDICINE

## 2021-04-30 PROCEDURE — 83690 ASSAY OF LIPASE: CPT | Performed by: EMERGENCY MEDICINE

## 2021-04-30 PROCEDURE — 93308 TTE F-UP OR LMTD: CPT | Mod: 26 | Performed by: EMERGENCY MEDICINE

## 2021-04-30 PROCEDURE — 36415 COLL VENOUS BLD VENIPUNCTURE: CPT | Performed by: EMERGENCY MEDICINE

## 2021-04-30 RX ORDER — DICYCLOMINE HYDROCHLORIDE 10 MG/1
10 CAPSULE ORAL ONCE
Status: COMPLETED | OUTPATIENT
Start: 2021-04-30 | End: 2021-04-30

## 2021-04-30 RX ORDER — IOPAMIDOL 755 MG/ML
100 INJECTION, SOLUTION INTRAVASCULAR ONCE
Status: COMPLETED | OUTPATIENT
Start: 2021-04-30 | End: 2021-04-30

## 2021-04-30 RX ORDER — HYDROMORPHONE HYDROCHLORIDE 1 MG/ML
0.5 INJECTION, SOLUTION INTRAMUSCULAR; INTRAVENOUS; SUBCUTANEOUS ONCE
Status: COMPLETED | OUTPATIENT
Start: 2021-04-30 | End: 2021-04-30

## 2021-04-30 RX ORDER — MAGNESIUM SULFATE HEPTAHYDRATE 40 MG/ML
2 INJECTION, SOLUTION INTRAVENOUS ONCE
Status: COMPLETED | OUTPATIENT
Start: 2021-04-30 | End: 2021-04-30

## 2021-04-30 RX ADMIN — IOPAMIDOL 100 ML: 755 INJECTION, SOLUTION INTRAVENOUS at 23:28

## 2021-04-30 RX ADMIN — SODIUM CHLORIDE 1000 ML: 9 INJECTION, SOLUTION INTRAVENOUS at 21:13

## 2021-04-30 RX ADMIN — MAGNESIUM SULFATE IN WATER 2 G: 40 INJECTION, SOLUTION INTRAVENOUS at 21:12

## 2021-04-30 RX ADMIN — HYDROMORPHONE HYDROCHLORIDE 0.5 MG: 1 INJECTION, SOLUTION INTRAMUSCULAR; INTRAVENOUS; SUBCUTANEOUS at 21:35

## 2021-04-30 RX ADMIN — DICYCLOMINE HYDROCHLORIDE 10 MG: 10 CAPSULE ORAL at 22:59

## 2021-04-30 ASSESSMENT — ENCOUNTER SYMPTOMS
SHORTNESS OF BREATH: 0
COUGH: 0
CHILLS: 0
FEVER: 0

## 2021-05-01 VITALS
BODY MASS INDEX: 25.75 KG/M2 | WEIGHT: 150 LBS | RESPIRATION RATE: 10 BRPM | HEART RATE: 76 BPM | DIASTOLIC BLOOD PRESSURE: 73 MMHG | SYSTOLIC BLOOD PRESSURE: 137 MMHG | TEMPERATURE: 96.2 F | OXYGEN SATURATION: 93 %

## 2021-05-01 NOTE — ED PROVIDER NOTES
History     Chief Complaint   Patient presents with     Chest Pain     epigastric pain that started  when she was having dinner and she felt syncopal     HPI  Debra Park is a 56 year old female who is here after an episode of chest pain while at [facility] having drinks and dinner.  Pain was in epigastric substernal area.  Did not radiate.  Was social with diaphoresis and presyncopal symptoms, tunnel vision specifically.  Soon afterwards had diarrhea.  Since then has had no diarrhea.  At this time, does not feel any significant discomfort.  No history of similar in the past.  Nobody who she was with had similar symptoms after eating her food.  No recent travel or surgery.    Allergies:  Allergies   Allergen Reactions     Augmentin Nausea and Vomiting     Cats Swelling       Problem List:    Patient Active Problem List    Diagnosis Date Noted     Facial cellulitis 08/29/2016     Priority: Medium        Past Medical History:    Past Medical History:   Diagnosis Date     Anxiety      Depressive disorder      GERD (gastroesophageal reflux disease)        Past Surgical History:    Past Surgical History:   Procedure Laterality Date     BREAST SURGERY      augmentation     CHOLECYSTECTOMY       ENT SURGERY      T&A     GYN SURGERY      endo ablation     ORTHOPEDIC SURGERY      rotator cuff repair     SOFT TISSUE SURGERY      basal cell ca       Family History:    Family History   Problem Relation Age of Onset     Hypertension Mother      Hypertension Father      Diabetes Father      Hyperlipidemia Father        Social History:  Marital Status:   [4]  Social History     Tobacco Use     Smoking status: Current Every Day Smoker     Packs/day: 1.00     Years: 35.00     Pack years: 35.00     Smokeless tobacco: Never Used   Substance Use Topics     Alcohol use: Yes     Comment: social     Drug use: No        Medications:    acetaminophen-codeine (TYLENOL #3) 300-30 MG tablet  citalopram (CELEXA) 20 MG  tablet  albuterol (ALBUTEROL) 108 (90 BASE) MCG/ACT Inhaler  ibuprofen (ADVIL/MOTRIN) 200 MG tablet          Review of Systems   Constitutional: Negative for chills and fever.   Respiratory: Negative for cough and shortness of breath.    All other systems reviewed and are negative.      Physical Exam   BP: 131/76  Pulse: 76  Temp: 96.2  F (35.7  C)  Resp: 20  Weight: 68 kg (150 lb)  SpO2: 99 %      Physical Exam  Vitals signs and nursing note reviewed.   Constitutional:       General: She is not in acute distress.     Appearance: She is well-developed. She is not diaphoretic.   HENT:      Head: Atraumatic.      Mouth/Throat:      Pharynx: No oropharyngeal exudate.   Eyes:      General: No scleral icterus.     Pupils: Pupils are equal, round, and reactive to light.   Cardiovascular:      Rate and Rhythm: Normal rate and regular rhythm.      Heart sounds: Normal heart sounds.   Pulmonary:      Effort: No respiratory distress.      Breath sounds: Normal breath sounds.   Abdominal:      General: Bowel sounds are normal.      Palpations: Abdomen is soft.      Tenderness: There is no abdominal tenderness.   Musculoskeletal:         General: No tenderness.      Right lower leg: She exhibits no tenderness.      Left lower leg: She exhibits no tenderness.   Skin:     General: Skin is warm.      Capillary Refill: Capillary refill takes less than 2 seconds.      Findings: No rash.   Neurological:      General: No focal deficit present.      Mental Status: She is alert and oriented to person, place, and time.   Psychiatric:         Mood and Affect: Mood normal.         Behavior: Behavior normal.         ED Course        Procedures    Results for orders placed during the hospital encounter of 04/30/21   POC US RETROPERITONEAL LIMITED    Impression [facility] Procedure Note      Limited Bedside ED Aorta Ultrasound:    PROCEDURE: PERFORMED BY: Dr. Unruly Lamb MD  INDICATIONS:  Chest Pain    PROBE:  Low frequency convex  probe  BODY LOCATION: Abdomen  FINDINGS:  The ultrasound was performed using longitudinal and transverse views.   Normal: Abdominal aorta < 4 cm.  MEASUREMENT:  2 cm     INTERPRETATION:  The evaluation of the aorta was of normal caliber (ie < 4cm in the transverse/longitudinal views) without evidence of aneurysm or dilation.  Aorta visualized in entirety from insertion into the intra-abdominal cavity to bifurcation into iliac vessels. There was no abdominal free fluid.  IMAGE DOCUMENTATION: Images were archived to PACs system.     POC US ECHO LIMITED    [facility] Procedure Note      Limited Bedside ED Cardiac Ultrasound:    PROCEDURE: PERFORMED BY: Dr. Unruly Lamb MD  INDICATIONS/SYMPTOM:  Chest Pain  PROBE: Cardiac phased array probe  BODY LOCATION: Chest  FINDINGS:   The ultrasound was performed utilizing the parasternal long axis and parasternal short axis views.  Cardiac contractility:  Present  Gross estimation of cardiac kinesis: normal  Pericardial Effusion:  None  RV:LV ratio: LV > RV  INTERPRETATION:    Chamber size and motion were grossly normal with LV > RV, normal cardiac kinesis.  No pericardial effusion was found.  IVC visualized and findings indicate normovolemia.  IMAGE DOCUMENTATION: Images were archived to PACs system.                   EKG Interpretation:      Interpreted by Unruly Lamb MD  Time reviewed:   Symptoms at time of EKG: none   Rhythm: normal sinus   Rate: normal  Axis: normal  Ectopy: none  Conduction: normal  ST Segments/ T Waves: No ST-T wave changes  Q Waves: none  Comparison to prior: Unchanged    Clinical Impression: normal EKG          Critical Care time:  none               Results for orders placed or performed during the hospital encounter of 04/30/21 (from the past 24 hour(s))   CBC with platelets differential   Result Value Ref [place]    WBC 8.6 4.0 - 11.0 10e9/L    RBC Count 4.39 3.8 - 5.2 10e12/L    Hemoglobin 13.6 11.7 - 15.7 g/dL    Hematocrit  40.8 35.0 - 47.0 %    MCV 93 78 - 100 fl    MCH 31.0 26.5 - 33.0 pg    MCHC 33.3 31.5 - 36.5 g/dL    RDW 12.3 10.0 - 15.0 %    Platelet Count 265 150 - 450 10e9/L    Diff Method Automated Method     % Neutrophils 55.1 %    % Lymphocytes 31.3 %    % Monocytes 8.6 %    % Eosinophils 3.8 %    % Basophils 0.9 %    % Immature Granulocytes 0.3 %    Nucleated RBCs 0 0 /100    Absolute Neutrophil 4.8 1.6 - 8.3 10e9/L    Absolute Lymphocytes 2.7 0.8 - 5.3 10e9/L    Absolute Monocytes 0.7 0.0 - 1.3 10e9/L    Absolute Eosinophils 0.3 0.0 - 0.7 10e9/L    Absolute Basophils 0.1 0.0 - 0.2 10e9/L    Abs Immature Granulocytes 0.0 0 - 0.4 10e9/L    Absolute Nucleated RBC 0.0    D dimer quantitative   Result Value Ref [place]    D Dimer <0.3 0.0 - 0.50 ug/ml FEU   Comprehensive metabolic panel   Result Value Ref [place]    Sodium 138 133 - 144 mmol/L    Potassium 3.2 (L) 3.4 - 5.3 mmol/L    Chloride 106 94 - 109 mmol/L    Carbon Dioxide 25 20 - 32 mmol/L    Anion Gap 7 3 - 14 mmol/L    Glucose 110 (H) 70 - 99 mg/dL    Urea Nitrogen 10 7 - 30 mg/dL    Creatinine 0.87 0.52 - 1.04 mg/dL    GFR Estimate 75 >60 mL/min/[1.73_m2]    GFR Estimate If Black 87 >60 mL/min/[1.73_m2]    Calcium 8.8 8.5 - 10.1 mg/dL    Bilirubin Total 0.2 0.2 - 1.3 mg/dL    Albumin 4.1 3.4 - 5.0 g/dL    Protein Total 7.3 6.8 - 8.8 g/dL    Alkaline Phosphatase 54 40 - 150 U/L    ALT 38 0 - 50 U/L    AST 26 0 - 45 U/L   Lipase   Result Value Ref [place]    Lipase 72 (L) 73 - 393 U/L   Lactic acid whole blood   Result Value Ref [place]    Lactic Acid 2.2 (H) 0.7 - 2.0 mmol/L   Troponin I   Result Value Ref [place]    Troponin I ES <0.015 0.000 - 0.045 ug/L   Blood gas venous and oxyhgb   Result Value Ref [place]    Ph Venous 7.35 7.32 - 7.43 pH    PCO2 Venous 47 40 - 50 mm Hg    PO2 Venous 53 (H) 25 - 47 mm Hg    Bicarbonate Venous 26 21 - 28 mmol/L    FIO2 21%     Oxyhemoglobin Venous 80 %    Base Deficit Venous 0.5 mmol/L   Nt probnp inpatient (BNP)   Result Value Ref [place]     N-Terminal Pro BNP Inpatient 45 0 - 900 pg/mL   XR Chest Port 1 View    Narrative    PROCEDURE:  XR CHEST PORT 1 VIEW    HISTORY:  chest pain.     COMPARISON:  January 2020    FINDINGS:   The cardiac silhouette is normal in size. The pulmonary vasculature is  normal.  The lungs are clear. No pleural effusion or pneumothorax.      Impression    IMPRESSION:  No acute cardiopulmonary disease.      ISRAEL FREY MD   POC US ECHO LIMITED    [facility] Procedure Note      Limited Bedside ED Cardiac Ultrasound:    PROCEDURE: PERFORMED BY: Dr. Unruly Lamb MD  INDICATIONS/SYMPTOM:  Chest Pain  PROBE: Cardiac phased array probe  BODY LOCATION: Chest  FINDINGS:   The ultrasound was performed utilizing the parasternal long axis and parasternal short axis views.  Cardiac contractility:  Present  Gross estimation of cardiac kinesis: normal  Pericardial Effusion:  None  RV:LV ratio: LV > RV  INTERPRETATION:    Chamber size and motion were grossly normal with LV > RV, normal cardiac kinesis.  No pericardial effusion was found.  IVC visualized and findings indicate normovolemia.  IMAGE DOCUMENTATION: Images were archived to PACs system.         POC US RETROPERITONEAL LIMITED (Aorta/renal)    [facility] Procedure Note      Limited Bedside ED Aorta Ultrasound:    PROCEDURE: PERFORMED BY: Dr. Unruly Lamb MD  INDICATIONS:  Chest Pain    PROBE:  Low frequency convex probe  BODY LOCATION: Abdomen  FINDINGS:  The ultrasound was performed using longitudinal and transverse views.   Normal: Abdominal aorta < 4 cm.  MEASUREMENT:  2 cm     INTERPRETATION:  The evaluation of the aorta was of normal caliber (ie < 4cm in the transverse/longitudinal views) without evidence of aneurysm or dilation.  Aorta visualized in entirety from insertion into the intra-abdominal cavity to bifurcation into iliac vessels. There was no abdominal free fluid.  IMAGE DOCUMENTATION: Images were archived to PACs  system.     Lactic acid whole blood   Result Value Ref [place]    Lactic Acid 0.7 0.7 - 2.0 mmol/L   Troponin I   Result Value Ref [place]    Troponin I ES <0.015 0.000 - 0.045 ug/L       Medications   magnesium sulfate 2 g in water intermittent infusion (2 g Intravenous New Bag 21)   0.9% sodium chloride BOLUS (0 mLs Intravenous Stopped 21)   HYDROmorphone (PF) (DILAUDID) injection 0.5 mg (0.5 mg Intravenous Given 21)   dicyclomine (BENTYL) capsule 10 mg (10 mg Oral Given 21)   iopamidol (ISOVUE-370) solution 100 mL (100 mLs Intravenous Given 21)   sodium chloride (PF) 0.9% PF flush 100 mL (50 mLs Intravenous Given 21)       Assessments & Plan (with Medical Decision Making)     I have reviewed the nursing notes.    I have reviewed the findings, diagnosis, plan and need for follow up with the patient.  Given chest pain with presyncope, will get D-dimer.  We will also do ultrasound looking for AAA as she had a family member who  of one.  Reassessment as of  D-dimer negative so will not need to pursue work-up for PE or aortic dissection, K low 3.2 will replete mag, give fluids for diarrhea, and then while potassium.  Will do a 4-hour troponin.  Stable, okay to be discharged home.   Reassessment as of 11, patient had continued episodes of abdominal discomfort, while labs are reassuring, patient's presentation may be atypical therefore will get CTA of chest abdomen pelvis to look for aortic dissection.  If that is negative, at that point she will be 2 troponin negative, therefore stable for discharge home as I will ruled out emergent pathology.    New Prescriptions    No medications on file       Final diagnoses:   Epigastric pain   Diarrhea, unspecified type       2021   [facility]     Unruly Lamb MD  21 0014

## 2021-05-01 NOTE — ED TRIAGE NOTES
"Pt states she developed chest pain/epigastric pain after eating dinner, was sweaty and had nausea. \"felt like I was going to pass out\"  "

## 2021-05-01 NOTE — DISCHARGE INSTRUCTIONS

## 2021-05-01 NOTE — ED NOTES
DATE:  4/30/2021   TIME OF RECEIPT FROM LAB:  2013  LAB TEST:  Lactic  LAB VALUE:  2.2  RESULTS GIVEN WITH READ-BACK TO DR Lamb  TIME LAB VALUE REPORTED TO PROVIDER:   Narendra

## 2021-05-03 ENCOUNTER — HOSPITAL ENCOUNTER (OUTPATIENT)
Dept: PHYSICAL THERAPY | Facility: HOSPITAL | Age: 56
Setting detail: THERAPIES SERIES
End: 2021-05-03
Attending: FAMILY MEDICINE
Payer: COMMERCIAL

## 2021-05-03 PROCEDURE — 97110 THERAPEUTIC EXERCISES: CPT | Mod: GP

## 2021-05-03 PROCEDURE — 97140 MANUAL THERAPY 1/> REGIONS: CPT | Mod: GP

## 2021-05-11 ENCOUNTER — HOSPITAL ENCOUNTER (OUTPATIENT)
Dept: PHYSICAL THERAPY | Facility: HOSPITAL | Age: 56
Setting detail: THERAPIES SERIES
End: 2021-05-11
Attending: FAMILY MEDICINE
Payer: COMMERCIAL

## 2021-05-11 PROCEDURE — 999N000214 HC STATISTIC PT OUTPT VISIT

## 2021-08-10 ENCOUNTER — MEDICAL CORRESPONDENCE (OUTPATIENT)
Dept: HEALTH INFORMATION MANAGEMENT | Facility: CLINIC | Age: 56
End: 2021-08-10
Payer: COMMERCIAL

## 2021-08-11 ENCOUNTER — MEDICAL CORRESPONDENCE (OUTPATIENT)
Dept: MRI IMAGING | Facility: HOSPITAL | Age: 56
End: 2021-08-11

## 2021-08-20 ENCOUNTER — HOSPITAL ENCOUNTER (OUTPATIENT)
Dept: MRI IMAGING | Facility: HOSPITAL | Age: 56
Discharge: HOME | End: 2021-08-20
Attending: FAMILY MEDICINE | Admitting: FAMILY MEDICINE
Payer: COMMERCIAL

## 2021-08-20 DIAGNOSIS — M25.561 RIGHT KNEE PAIN: ICD-10-CM

## 2021-08-20 PROCEDURE — 73721 MRI JNT OF LWR EXTRE W/O DYE: CPT | Mod: RT

## 2021-09-11 ENCOUNTER — APPOINTMENT (OUTPATIENT)
Dept: CT IMAGING | Facility: HOSPITAL | Age: 56
End: 2021-09-11
Attending: EMERGENCY MEDICINE
Payer: COMMERCIAL

## 2021-09-11 ENCOUNTER — HOSPITAL ENCOUNTER (EMERGENCY)
Facility: HOSPITAL | Age: 56
Discharge: HOME | End: 2021-09-11
Attending: EMERGENCY MEDICINE | Admitting: EMERGENCY MEDICINE
Payer: COMMERCIAL

## 2021-09-11 ENCOUNTER — APPOINTMENT (OUTPATIENT)
Dept: GENERAL RADIOLOGY | Facility: HOSPITAL | Age: 56
End: 2021-09-11
Attending: EMERGENCY MEDICINE
Payer: COMMERCIAL

## 2021-09-11 VITALS
SYSTOLIC BLOOD PRESSURE: 144 MMHG | OXYGEN SATURATION: 98 % | TEMPERATURE: 98 F | WEIGHT: 160 LBS | HEART RATE: 55 BPM | DIASTOLIC BLOOD PRESSURE: 98 MMHG | RESPIRATION RATE: 20 BRPM | HEIGHT: 64 IN | BODY MASS INDEX: 27.31 KG/M2

## 2021-09-11 DIAGNOSIS — S30.1XXA CONTUSION OF FLANK, INITIAL ENCOUNTER: ICD-10-CM

## 2021-09-11 DIAGNOSIS — R10.9 RIGHT FLANK PAIN: ICD-10-CM

## 2021-09-11 LAB
ALBUMIN UR-MCNC: NEGATIVE MG/DL
ANION GAP SERPL CALCULATED.3IONS-SCNC: 5 MMOL/L (ref 3–14)
APPEARANCE UR: CLEAR
BACTERIA #/AREA URNS HPF: ABNORMAL /HPF
BASOPHILS # BLD AUTO: 0 10E3/UL (ref 0–0.2)
BASOPHILS NFR BLD AUTO: 0 %
BILIRUB UR QL STRIP: NEGATIVE
BUN SERPL-MCNC: 20 MG/DL (ref 7–30)
CALCIUM SERPL-MCNC: 8.9 MG/DL (ref 8.5–10.1)
CHLORIDE BLD-SCNC: 109 MMOL/L (ref 94–109)
CO2 SERPL-SCNC: 25 MMOL/L (ref 20–32)
COLOR UR AUTO: ABNORMAL
CREAT SERPL-MCNC: 0.93 MG/DL (ref 0.52–1.04)
EOSINOPHIL # BLD AUTO: 0.1 10E3/UL (ref 0–0.7)
EOSINOPHIL NFR BLD AUTO: 1 %
ERYTHROCYTE [DISTWIDTH] IN BLOOD BY AUTOMATED COUNT: 12.2 % (ref 10–15)
GFR SERPL CREATININE-BSD FRML MDRD: 70 ML/MIN/1.73M2
GLUCOSE BLD-MCNC: 114 MG/DL (ref 70–99)
GLUCOSE UR STRIP-MCNC: NEGATIVE MG/DL
HCT VFR BLD AUTO: 40 % (ref 35–47)
HGB BLD-MCNC: 13.6 G/DL (ref 11.7–15.7)
HGB UR QL STRIP: ABNORMAL
IMM GRANULOCYTES # BLD: 0.1 10E3/UL
IMM GRANULOCYTES NFR BLD: 0 %
KETONES UR STRIP-MCNC: NEGATIVE MG/DL
LEUKOCYTE ESTERASE UR QL STRIP: NEGATIVE
LYMPHOCYTES # BLD AUTO: 1.6 10E3/UL (ref 0.8–5.3)
LYMPHOCYTES NFR BLD AUTO: 11 %
MCH RBC QN AUTO: 31.3 PG (ref 26.5–33)
MCHC RBC AUTO-ENTMCNC: 34 G/DL (ref 31.5–36.5)
MCV RBC AUTO: 92 FL (ref 78–100)
MONOCYTES # BLD AUTO: 1.2 10E3/UL (ref 0–1.3)
MONOCYTES NFR BLD AUTO: 8 %
MUCOUS THREADS #/AREA URNS LPF: PRESENT /LPF
NEUTROPHILS # BLD AUTO: 11.5 10E3/UL (ref 1.6–8.3)
NEUTROPHILS NFR BLD AUTO: 80 %
NITRATE UR QL: NEGATIVE
NRBC # BLD AUTO: 0 10E3/UL
NRBC BLD AUTO-RTO: 0 /100
PH UR STRIP: 6 [PH] (ref 4.7–8)
PLATELET # BLD AUTO: 253 10E3/UL (ref 150–450)
POTASSIUM BLD-SCNC: 3.5 MMOL/L (ref 3.4–5.3)
RBC # BLD AUTO: 4.35 10E6/UL (ref 3.8–5.2)
RBC URINE: 1 /HPF
SODIUM SERPL-SCNC: 139 MMOL/L (ref 133–144)
SP GR UR STRIP: 1 (ref 1–1.03)
SQUAMOUS EPITHELIAL: 0 /HPF
UROBILINOGEN UR STRIP-MCNC: NORMAL MG/DL
WBC # BLD AUTO: 14.5 10E3/UL (ref 4–11)
WBC URINE: 1 /HPF

## 2021-09-11 PROCEDURE — 99285 EMERGENCY DEPT VISIT HI MDM: CPT | Mod: 25

## 2021-09-11 PROCEDURE — 80048 BASIC METABOLIC PNL TOTAL CA: CPT | Performed by: EMERGENCY MEDICINE

## 2021-09-11 PROCEDURE — 36415 COLL VENOUS BLD VENIPUNCTURE: CPT | Performed by: EMERGENCY MEDICINE

## 2021-09-11 PROCEDURE — 81003 URINALYSIS AUTO W/O SCOPE: CPT | Performed by: EMERGENCY MEDICINE

## 2021-09-11 PROCEDURE — 250N000013 HC RX MED GY IP 250 OP 250 PS 637: Performed by: EMERGENCY MEDICINE

## 2021-09-11 PROCEDURE — 99284 EMERGENCY DEPT VISIT MOD MDM: CPT | Performed by: EMERGENCY MEDICINE

## 2021-09-11 PROCEDURE — 74177 CT ABD & PELVIS W/CONTRAST: CPT

## 2021-09-11 PROCEDURE — 71046 X-RAY EXAM CHEST 2 VIEWS: CPT

## 2021-09-11 PROCEDURE — 255N000002 HC RX 255 OP 636: Performed by: EMERGENCY MEDICINE

## 2021-09-11 PROCEDURE — 85025 COMPLETE CBC W/AUTO DIFF WBC: CPT | Performed by: EMERGENCY MEDICINE

## 2021-09-11 RX ORDER — IOPAMIDOL 612 MG/ML
100 INJECTION, SOLUTION INTRAVASCULAR ONCE
Status: COMPLETED | OUTPATIENT
Start: 2021-09-11 | End: 2021-09-11

## 2021-09-11 RX ORDER — MOMETASONE FUROATE MONOHYDRATE 50 UG/1
SPRAY, METERED NASAL
COMMUNITY
Start: 2020-12-08

## 2021-09-11 RX ORDER — OXYCODONE AND ACETAMINOPHEN 5; 325 MG/1; MG/1
1 TABLET ORAL ONCE
Status: COMPLETED | OUTPATIENT
Start: 2021-09-11 | End: 2021-09-11

## 2021-09-11 RX ORDER — CHOLESTYRAMINE 4 G/9G
POWDER, FOR SUSPENSION ORAL
COMMUNITY
Start: 2021-05-04

## 2021-09-11 RX ORDER — ALPRAZOLAM 0.25 MG
TABLET ORAL
COMMUNITY
Start: 2021-02-19

## 2021-09-11 RX ADMIN — IOPAMIDOL 100 ML: 612 INJECTION, SOLUTION INTRAVENOUS at 13:09

## 2021-09-11 RX ADMIN — OXYCODONE HYDROCHLORIDE AND ACETAMINOPHEN 1 TABLET: 5; 325 TABLET ORAL at 13:37

## 2021-09-11 ASSESSMENT — MIFFLIN-ST. JEOR: SCORE: 1310.76

## 2021-09-11 NOTE — ED NOTES
Right sided pain after falling yesterday.  States urine is off color.  Awake and alert.  Pain 2/10 when not moving.

## 2021-09-11 NOTE — ED PROVIDER NOTES
EMERGENCY DEPARTMENT ENCOUNTER      NAME: Debra Park  AGE: 56 year old female  YOB: 1965  MRN: [id number]  EVALUATION DATE & TIME: 2021 11:14 AM    PCP: Kavon Morales    ED PROVIDER: Oscar Mcdonald M.D.      Chief Complaint   Patient presents with     Fall     Rib Pain       FINAL IMPRESSION:  1. Right flank pain    2. Contusion of flank, initial encounter        ED COURSE & MEDICAL DECISION MAKIN year old female presents to the Emergency Department for evaluation of right flank pain.  She fell onto some bricks yesterday.  She has some tenderness to the inferior chest wall and right flank musculature.  She is vitally stable.  She also has some mild right upper quadrant abdominal tenderness and reports that her urine was somewhat dark earlier today.  Chest x-ray today is clear.  Urinalysis is negative for blood at this time.  She does have mild leukocytosis which is probably stress demargination, her hemoglobin is stable.  CT abdomen pelvis ultimately performed which was negative for rib fracture, solid organ injury, intra-abdominal hemorrhage.  She was given 1 dose of Percocet and was resting comfortably on recheck.  We discussed supportive cares at home including continued NSAIDs, topical lidocaine, and avoidance of strenuous activity.  She can follow-up with primary care in 1 week to review any ongoing symptoms.    At the conclusion of the encounter I discussed the results of all of the tests and the disposition. The questions were answered. The patient or family acknowledged understanding and was agreeable with the care plan.     MEDICATIONS GIVEN IN THE EMERGENCY:  Medications   iopamidol (ISOVUE-300) IV solution 61% 100 mL (100 mLs Intravenous Given 21 1309)   sodium chloride (PF) 0.9% PF flush 60 mL (60 mLs Intravenous Given 21 1309)   oxyCODONE-acetaminophen (PERCOCET) 5-325 MG per tablet 1 tablet (1 tablet Oral Given 21 1337)       NEW PRESCRIPTIONS  STARTED AT TODAY'S ER VISIT  New Prescriptions    No medications on file          =================================================================    HPI    Patient information was obtained from: Patient     Debra Park is a 56 year old female with no significant past medical history who presents to this ED today for evaluation of fall with right-sided rib and flank pain and possible urinary discoloration.  Patient had fallen yesterday, tripping over a brick on her sidewalk and landing on a larger pile of bricks on her right side.  She has had constant pain in her right flank, lower chest wall since yesterday.  This morning she was alternating Tylenol 3 and ibuprofen for pain.  She noted that her urine appeared darker which prompted her to come in for evaluation.  She has been able to ambulate.  She reports pain is improved but not completely alleviated by Tylenol and ibuprofen.  She denies any head injury or midline back pain.      REVIEW OF SYSTEMS   All systems reviewed and negative except as noted in HPI.    PAST MEDICAL HISTORY:  Past Medical History:   Diagnosis Date     Anxiety      Depressive disorder      GERD (gastroesophageal reflux disease)        PAST SURGICAL HISTORY:  Past Surgical History:   Procedure Laterality Date     BREAST SURGERY      augmentation     CHOLECYSTECTOMY       ENT SURGERY      T&A     GYN SURGERY      endo ablation     ORTHOPEDIC SURGERY      rotator cuff repair     SOFT TISSUE SURGERY      basal cell ca           CURRENT MEDICATIONS:    No current facility-administered medications for this encounter.     Current Outpatient Medications   Medication     acetaminophen-codeine (TYLENOL #3) 300-30 MG tablet     albuterol (ALBUTEROL) 108 (90 BASE) MCG/ACT Inhaler     ALPRAZolam (XANAX) 0.25 MG tablet     cholestyramine (QUESTRAN) 4 g packet     citalopram (CELEXA) 20 MG tablet     ibuprofen (ADVIL/MOTRIN) 200 MG tablet     mometasone (NASONEX) 50 MCG/ACT nasal spray          ALLERGIES:  Allergies   Allergen Reactions     Augmentin Nausea and Vomiting     Cats Swelling       FAMILY HISTORY:  Family History   Problem Relation Age of Onset     Hypertension Mother      Hypertension Father      Diabetes Father      Hyperlipidemia Father        SOCIAL HISTORY:   Social History     Socioeconomic History     Marital status:      Spouse name: None     Number of children: None     Years of education: None     Highest education level: None   Occupational History     None   Tobacco Use     Smoking status: Current Every Day Smoker     Packs/day: 1.00     Years: 35.00     Pack years: 35.00     Smokeless tobacco: Never Used   Substance and Sexual Activity     Alcohol use: Yes     Comment: social     Drug use: No     Sexual activity: Yes     Partners: Male   Other Topics Concern     Parent/sibling w/ CABG, MI or angioplasty before 65F 55M? Not Asked   Social History Narrative    ** Merged History Encounter **         p 10/14/2013.     Social Determinants of Health     Financial Resource Strain:      Difficulty of Paying Living Expenses:    Food Insecurity:      Worried About Running Out of Food in the Last Year:      Ran Out of Food in the Last Year:    Transportation Needs:      Lack of Transportation (Medical):      Lack of Transportation (Non-Medical):    Physical Activity:      Days of Exercise per Week:      Minutes of Exercise per Session:    Stress:      Feeling of Stress :    Social Connections:      Frequency of Communication with Friends and Family:      Frequency of Social Gatherings with Friends and Family:      Attends [employer] Services:      Active Member of Clubs or Organizations:      Attends Club or Organization Meetings:      Marital Status:    Intimate Partner Violence:      Fear of Current or Ex-Partner:      Emotionally Abused:      Physically Abused:      Sexually Abused:        VITALS:  /98   Pulse 58   Temp 98  F (36.7  C) (Tympanic)   Resp 20   Ht  "1.626 m (5' 4\")   Wt 72.6 kg (160 lb)   SpO2 98%   BMI 27.46 kg/m      PHYSICAL EXAM    Constitutional: Well developed, Well nourished, NAD.  HENT: Normocephalic, Atraumatic. Neck Supple.  Eyes: EOMI, Conjunctiva normal.  Respiratory: Breathing comfortably on room air. Speaks full sentences easily. Lungs clear to ascultation.  Cardiovascular: Normal heart rate, Regular rhythm. No peripheral edema.  Abdomen: Soft, some R flank and RUQ tenderness to deep palpation. No external trauma apparent.  Musculoskeletal: Good range of motion in all major joints. No major deformities noted. No midline tenderness to cervical, thoracic, or lumbar spine.  Integument: Warm, Dry.  Neurologic: Alert & awake, Normal motor function, Normal sensory function, No focal deficits noted.   Psychiatric: Cooperative. Affect appropriate.     LAB:  All pertinent labs reviewed and interpreted.  Labs Ordered and Resulted from Time of ED Arrival Up to the Time of Departure from the ED   ROUTINE UA WITH MICROSCOPIC REFLEX TO CULTURE - Abnormal; Notable for the following components:       Result Value    Blood Urine Trace (*)     Bacteria Urine Few (*)     Mucus Urine Present (*)     All other components within normal limits    Narrative:     Urine Culture not indicated   BASIC METABOLIC PANEL - Abnormal; Notable for the following components:    Glucose 114 (*)     All other components within normal limits   CBC WITH PLATELETS AND DIFFERENTIAL - Abnormal; Notable for the following components:    WBC Count 14.5 (*)     Absolute Neutrophils 11.5 (*)     Absolute Immature Granulocytes 0.1 (*)     All other components within normal limits   CBC WITH PLATELETS & DIFFERENTIAL    Narrative:     The following orders were created for panel order CBC with platelets differential.  Procedure                               Abnormality         Status                     ---------                               -----------         ------                     CBC " with platelets and d...[[id number]]  Abnormal            Final result                 Please view results for these tests on the individual orders.   PERIPHERAL IV CATHETER       RADIOLOGY:  Reviewed all pertinent imaging. Please see official radiology report.  Chest XR,  PA & LAT    (Results Pending)   CT Abdomen Pelvis w Contrast    (Results Pending)           Oscar Mcdonald M.D.  Emergency Medicine  [facility]  [address] [place] [id number] [phone]  Dept: [phone]       Oscar Mcdonald MD  09/11/21 1429

## 2021-09-11 NOTE — ED NOTES
Discharge instructions reviewed and sent with pt.  Verbalized understanding and able to ambulate without difficulty.  Boyfriend here to provide ride home.

## 2021-09-11 NOTE — ED TRIAGE NOTES
Pt in for evaluation of right rib pain after a fall in her backyard yesterday. Has been taking tylenol and ibuprofen for pain with some relief. Pain constant, worsens with movement. Denies sob. Some urine discoloration this am, concerned for possible hematuria.

## 2021-09-11 NOTE — DISCHARGE INSTRUCTIONS
You were seen today in the [facility] emergency department for a fall with right flank pain.  Your evaluation including chest x-ray and CT scan did not show any serious injuries including rib fractures or kidney or liver injury.  We would recommend continuing Tylenol and ibuprofen and you can add 4% lidocaine patches which are available over-the-counter.  Please avoid strenuous activities until you are recovered.  You can follow-up with primary care in 1 week for any ongoing concerns.    What to expect when you have contrast    During your exam, we will inject  contrast  into your vein or artery. (Contrast is a clear liquid with iodine in it. It shows up on X-rays.)    You may feel warm or hot. You may have a metal taste in your mouth and a slight upset stomach. You may also feel pressure near the kidneys and bladder. These effects will last about 1 to 3 minutes.    Please tell us if you have:   Sneezing    Itching   Hives    Swelling in the face   A hoarse voice   Breathing problems   Other new symptoms    Serious problems are rare.  They may include:   Irregular heartbeat    Seizures   Kidney failure             Tissue damage   Shock     Death    If you have any problems during the exam, we  will treat them right away.    When you get home    Call your hospital if you have any new symptoms in the next 2 days, like hives or swelling. (Phone numbers are at the bottom of this page.) Or call your family doctor.     If you have wheezing or trouble breathing, call 911.    Self-care  -Drink at least 4 extra glasses of water today.   This reduces the stress on your kidneys.  -Keep taking your regular medicines.    The contrast will pass out of your body in your  Urine(pee). This will happen in the next 24 hours. You  will not feel this. Your urine will not  change color.        I have read and understand the above information.    Patient Sign Here:______________________________________Date:________Time:______    Staff  Sign Here:________________________________________Date:_______Time:______      Radiology Departments:     ?[facility]: [phone] ?[place]: [phone]     ?[place]: [phone] ?[facility]:[phone]      ?[place]: [phone]  ?[place]: [phone]  ?[place]:[phone]    ?[facility] [place]:[phone]  ?[facility]:[phone]

## 2021-11-11 ENCOUNTER — LAB (OUTPATIENT)
Dept: OCCUPATIONAL MEDICINE | Facility: OTHER | Age: 56
End: 2021-11-11

## 2021-12-28 ENCOUNTER — APPOINTMENT (OUTPATIENT)
Dept: OCCUPATIONAL MEDICINE | Facility: OTHER | Age: 56
End: 2021-12-28

## 2022-01-25 ENCOUNTER — HOSPITAL ENCOUNTER (EMERGENCY)
Facility: HOSPITAL | Age: 57
Discharge: HOME | End: 2022-01-25
Attending: STUDENT IN AN ORGANIZED HEALTH CARE EDUCATION/TRAINING PROGRAM | Admitting: STUDENT IN AN ORGANIZED HEALTH CARE EDUCATION/TRAINING PROGRAM
Payer: COMMERCIAL

## 2022-01-25 ENCOUNTER — APPOINTMENT (OUTPATIENT)
Dept: CT IMAGING | Facility: HOSPITAL | Age: 57
End: 2022-01-25
Attending: STUDENT IN AN ORGANIZED HEALTH CARE EDUCATION/TRAINING PROGRAM
Payer: COMMERCIAL

## 2022-01-25 VITALS
HEART RATE: 62 BPM | SYSTOLIC BLOOD PRESSURE: 120 MMHG | DIASTOLIC BLOOD PRESSURE: 77 MMHG | TEMPERATURE: 97.7 F | RESPIRATION RATE: 14 BRPM | OXYGEN SATURATION: 99 %

## 2022-01-25 DIAGNOSIS — K52.9 COLITIS: ICD-10-CM

## 2022-01-25 LAB
ALBUMIN SERPL-MCNC: 4.2 G/DL (ref 3.4–5)
ALBUMIN UR-MCNC: 10 MG/DL
ALP SERPL-CCNC: 56 U/L (ref 40–150)
ALT SERPL W P-5'-P-CCNC: 43 U/L (ref 0–50)
ANION GAP SERPL CALCULATED.3IONS-SCNC: 7 MMOL/L (ref 3–14)
APPEARANCE UR: CLEAR
AST SERPL W P-5'-P-CCNC: 26 U/L (ref 0–45)
BASOPHILS # BLD AUTO: 0.1 10E3/UL (ref 0–0.2)
BASOPHILS NFR BLD AUTO: 1 %
BILIRUB DIRECT SERPL-MCNC: <0.1 MG/DL (ref 0–0.2)
BILIRUB SERPL-MCNC: 0.3 MG/DL (ref 0.2–1.3)
BILIRUB UR QL STRIP: NEGATIVE
BUN SERPL-MCNC: 13 MG/DL (ref 7–30)
CALCIUM SERPL-MCNC: 9.3 MG/DL (ref 8.5–10.1)
CHLORIDE BLD-SCNC: 105 MMOL/L (ref 94–109)
CO2 SERPL-SCNC: 28 MMOL/L (ref 20–32)
COLOR UR AUTO: ABNORMAL
CREAT SERPL-MCNC: 1.04 MG/DL (ref 0.52–1.04)
EOSINOPHIL # BLD AUTO: 0.4 10E3/UL (ref 0–0.7)
EOSINOPHIL NFR BLD AUTO: 6 %
ERYTHROCYTE [DISTWIDTH] IN BLOOD BY AUTOMATED COUNT: 12.2 % (ref 10–15)
FLUAV RNA SPEC QL NAA+PROBE: NEGATIVE
FLUBV RNA RESP QL NAA+PROBE: NEGATIVE
GFR SERPL CREATININE-BSD FRML MDRD: 64 ML/MIN/1.73M2
GLUCOSE BLD-MCNC: 114 MG/DL (ref 70–99)
GLUCOSE UR STRIP-MCNC: NEGATIVE MG/DL
HCT VFR BLD AUTO: 42 % (ref 35–47)
HGB BLD-MCNC: 13.9 G/DL (ref 11.7–15.7)
HGB UR QL STRIP: ABNORMAL
HOLD SPECIMEN: NORMAL
HYALINE CASTS: 5 /LPF
IMM GRANULOCYTES # BLD: 0 10E3/UL
IMM GRANULOCYTES NFR BLD: 0 %
KETONES UR STRIP-MCNC: NEGATIVE MG/DL
LEUKOCYTE ESTERASE UR QL STRIP: NEGATIVE
LIPASE SERPL-CCNC: 64 U/L (ref 73–393)
LYMPHOCYTES # BLD AUTO: 1.9 10E3/UL (ref 0.8–5.3)
LYMPHOCYTES NFR BLD AUTO: 29 %
MCH RBC QN AUTO: 31 PG (ref 26.5–33)
MCHC RBC AUTO-ENTMCNC: 33.1 G/DL (ref 31.5–36.5)
MCV RBC AUTO: 94 FL (ref 78–100)
MONOCYTES # BLD AUTO: 0.5 10E3/UL (ref 0–1.3)
MONOCYTES NFR BLD AUTO: 8 %
MUCOUS THREADS #/AREA URNS LPF: PRESENT /LPF
NEUTROPHILS # BLD AUTO: 3.7 10E3/UL (ref 1.6–8.3)
NEUTROPHILS NFR BLD AUTO: 56 %
NITRATE UR QL: NEGATIVE
NRBC # BLD AUTO: 0 10E3/UL
NRBC BLD AUTO-RTO: 0 /100
PH UR STRIP: 6.5 [PH] (ref 4.7–8)
PLATELET # BLD AUTO: 236 10E3/UL (ref 150–450)
POTASSIUM BLD-SCNC: 3.7 MMOL/L (ref 3.4–5.3)
PROT SERPL-MCNC: 7.4 G/DL (ref 6.8–8.8)
RBC # BLD AUTO: 4.48 10E6/UL (ref 3.8–5.2)
RBC URINE: 2 /HPF
RSV RNA SPEC NAA+PROBE: NEGATIVE
SARS-COV-2 RNA RESP QL NAA+PROBE: NEGATIVE
SODIUM SERPL-SCNC: 140 MMOL/L (ref 133–144)
SP GR UR STRIP: 1.02 (ref 1–1.03)
SQUAMOUS EPITHELIAL: 1 /HPF
UROBILINOGEN UR STRIP-MCNC: NORMAL MG/DL
WBC # BLD AUTO: 6.6 10E3/UL (ref 4–11)
WBC URINE: 1 /HPF

## 2022-01-25 PROCEDURE — 80053 COMPREHEN METABOLIC PANEL: CPT | Performed by: STUDENT IN AN ORGANIZED HEALTH CARE EDUCATION/TRAINING PROGRAM

## 2022-01-25 PROCEDURE — 82248 BILIRUBIN DIRECT: CPT | Performed by: STUDENT IN AN ORGANIZED HEALTH CARE EDUCATION/TRAINING PROGRAM

## 2022-01-25 PROCEDURE — 85025 COMPLETE CBC W/AUTO DIFF WBC: CPT | Performed by: STUDENT IN AN ORGANIZED HEALTH CARE EDUCATION/TRAINING PROGRAM

## 2022-01-25 PROCEDURE — 83690 ASSAY OF LIPASE: CPT | Performed by: STUDENT IN AN ORGANIZED HEALTH CARE EDUCATION/TRAINING PROGRAM

## 2022-01-25 PROCEDURE — 250N000011 HC RX IP 250 OP 636: Performed by: STUDENT IN AN ORGANIZED HEALTH CARE EDUCATION/TRAINING PROGRAM

## 2022-01-25 PROCEDURE — 74177 CT ABD & PELVIS W/CONTRAST: CPT

## 2022-01-25 PROCEDURE — 81003 URINALYSIS AUTO W/O SCOPE: CPT | Performed by: STUDENT IN AN ORGANIZED HEALTH CARE EDUCATION/TRAINING PROGRAM

## 2022-01-25 PROCEDURE — 87637 SARSCOV2&INF A&B&RSV AMP PRB: CPT | Performed by: STUDENT IN AN ORGANIZED HEALTH CARE EDUCATION/TRAINING PROGRAM

## 2022-01-25 PROCEDURE — 36415 COLL VENOUS BLD VENIPUNCTURE: CPT | Performed by: STUDENT IN AN ORGANIZED HEALTH CARE EDUCATION/TRAINING PROGRAM

## 2022-01-25 PROCEDURE — 250N000011 HC RX IP 250 OP 636

## 2022-01-25 PROCEDURE — 99285 EMERGENCY DEPT VISIT HI MDM: CPT | Performed by: STUDENT IN AN ORGANIZED HEALTH CARE EDUCATION/TRAINING PROGRAM

## 2022-01-25 PROCEDURE — 99285 EMERGENCY DEPT VISIT HI MDM: CPT | Mod: 25

## 2022-01-25 PROCEDURE — 93005 ELECTROCARDIOGRAM TRACING: CPT

## 2022-01-25 PROCEDURE — 96374 THER/PROPH/DIAG INJ IV PUSH: CPT | Mod: XU

## 2022-01-25 PROCEDURE — 93010 ELECTROCARDIOGRAM REPORT: CPT | Performed by: INTERNAL MEDICINE

## 2022-01-25 PROCEDURE — C9803 HOPD COVID-19 SPEC COLLECT: HCPCS

## 2022-01-25 RX ORDER — ONDANSETRON 2 MG/ML
INJECTION INTRAMUSCULAR; INTRAVENOUS
Status: COMPLETED
Start: 2022-01-25 | End: 2022-01-25

## 2022-01-25 RX ORDER — ONDANSETRON 2 MG/ML
4 INJECTION INTRAMUSCULAR; INTRAVENOUS ONCE
Status: COMPLETED | OUTPATIENT
Start: 2022-01-25 | End: 2022-01-25

## 2022-01-25 RX ORDER — IOPAMIDOL 755 MG/ML
79 INJECTION, SOLUTION INTRAVASCULAR ONCE
Status: COMPLETED | OUTPATIENT
Start: 2022-01-25 | End: 2022-01-25

## 2022-01-25 RX ADMIN — IOPAMIDOL 79 ML: 755 INJECTION, SOLUTION INTRAVENOUS at 17:07

## 2022-01-25 RX ADMIN — ONDANSETRON 4 MG: 2 INJECTION INTRAMUSCULAR; INTRAVENOUS at 16:35

## 2022-01-25 NOTE — ED NOTES
Patient updates nurse that she has had her gallbladder out and she does endorse chronic diarrhea. She states primary told her it's gall bladder dumping.

## 2022-01-25 NOTE — ED NOTES
Patient presents with complaints of not feeling well for a few days she states that she's had a headache and some congestion. She states she started having issues with diarrhea today. She states she was waiting for employee health to call her as she states she was exposed to COVID as well as influenza last week. She does endorse passing out while sitting on the toilet while having a bowel movement this afternoon. She complains of feeling just tired.

## 2022-01-25 NOTE — ED TRIAGE NOTES
Pt presents with not feeling the past few days.  Was at work today  Went home feel asleep woke with stomach pains, had diarrhea, passed out and reports that it happened a few times. Reports to passing out a few times after each diarrhea stool  continues to some abdominal pains.  No fevers.  No recent covid testing.

## 2022-01-26 NOTE — DISCHARGE INSTRUCTIONS
Return to the emergency department for worsening symptoms or new concerning symptoms, please follow-up with primary care provider within the next week your symptoms are not improving.  Use ibuprofen and Tylenol if you develop any fevers, please discuss your episode of loss of conscious of the primary care provider and see whether you would like any further monitoring such as a Zio patch or Holter monitor.

## 2022-01-26 NOTE — ED PROVIDER NOTES
History     Chief Complaint   Patient presents with     Abdominal Pain     Syncope     HPI  Debra Park is a 56 year old female  with no relevant past medical history presents to the emergency department today stating that she has had chronic diarrhea, subsequently was sitting on the toilet having her usual diarrhea when she got lightheaded and lost consciousness woke up on the floor.  She states that nothing hurts afterwards, she did not have a headache did not have chest pain denies shortness of breath and abdominal pain, nausea or vomiting.  She has not had any blood in her stool and has not been vomiting any blood either.  Denies fevers, cough.  She has had some Covid exposure would like to be tested.  No other complaints at this time.    Allergies:  Allergies   Allergen Reactions     Augmentin Nausea and Vomiting     Cats Swelling       Problem List:    Patient Active Problem List    Diagnosis Date Noted     Facial cellulitis 08/29/2016     Priority: Medium        Past Medical History:    Past Medical History:   Diagnosis Date     Anxiety      Depressive disorder      GERD (gastroesophageal reflux disease)        Past Surgical History:    Past Surgical History:   Procedure Laterality Date     BREAST SURGERY      augmentation     CHOLECYSTECTOMY       ENT SURGERY      T&A     GYN SURGERY      endo ablation     ORTHOPEDIC SURGERY      rotator cuff repair     SOFT TISSUE SURGERY      basal cell ca       Family History:    Family History   Problem Relation Age of Onset     Hypertension Mother      Hypertension Father      Diabetes Father      Hyperlipidemia Father        Social History:  Marital Status:   [4]  Social History     Tobacco Use     Smoking status: Current Every Day Smoker     Packs/day: 1.00     Years: 35.00     Pack years: 35.00     Smokeless tobacco: Never Used   Substance Use Topics     Alcohol use: Yes     Comment: social     Drug use: No        Medications:    acetaminophen-codeine  (TYLENOL #3) 300-30 MG tablet  albuterol (ALBUTEROL) 108 (90 BASE) MCG/ACT Inhaler  ALPRAZolam (XANAX) 0.25 MG tablet  cholestyramine (QUESTRAN) 4 g packet  citalopram (CELEXA) 20 MG tablet  ibuprofen (ADVIL/MOTRIN) 200 MG tablet  mometasone (NASONEX) 50 MCG/ACT nasal spray          Review of Systems  A complete review of systems was performed and is otherwise negative.     Physical Exam   BP: 109/61  Pulse: 69  Temp: 97.7  F (36.5  C)  Resp: 16  SpO2: 99 %      Physical Exam  Constitutional: Alert and conversant. NAD   HENT: NCAT   Eyes: Normal pupils   Neck: supple   CV: Normal rate, regular rhythm, no murmur   Pulmonary/Chest: Non-labored respirations, clear to auscultation bilaterally   Abdominal: Soft, mildly-tender in a diffuse distribution, non-distended   MSK: CONTRERAS.   Neuro: Alert and appropriate   Skin: Warm and dry. No diaphoresis. No rashes on exposed skin    Psych: Appropriate mood and affect     ED Course              ED Course as of 01/25/22 1825 Tue Jan 25, 2022 1724 56 female with chronic diarrhea and recent Covid exposure was having her usual diarrhea issues today when she felt lightheaded and passed out.  She woke up without any evidence of having seized she did not bite her tongue she did not wet her pants.  Syncope evaluation underway, no concerning associated symptoms   1725 Debra Park is a 56 year old female presenting with transient loss of consciousness.   Vitals normal  Exam reassuring, well apeapring   Differential includes but is not limited to syncope, hypoglycemia, seizure, SAH/ICH, TIA, MI/ACS, arrythmia (WPW, brugada, Long QT, HOCM), aortic dissection, ruptured AAA, aortic stenosis, toxins/drugs, infection/sepsis, PE, GI bleed/hypovolemia, and orthostatic hypotension. No clinical history consistent with seizure.     I think arrhythmia is unlikely.  Electrocardiogram shows normal sinus rhythm with no interval abnormalities such as QT prolongation or WPW.  There are no findings  to suggest Brugada syndrome.  Cardiac monitoring in the emergency Department reveals no tachycardia or bradycardic dysrhythmia.  Hypertropic cardiomyopathy was considered but there are no clear historical elements pointing toward this.  Electrocardiogram is not suggestive.  The QRS voltage is not extremely large and there are no suggestive Q waves.     . Patient does not complain of headache so SAH unlikely. Neuro exam unremarkable, low suspicion for acute intracranial bleed or ischemia. EKG with normal intervals and no evidence of dysrhythmia or ischemia. No tearing pain to back and no chest pain so dissection less likely. Some ab pain, will CT. Hemodynamically stable so less consistent with ruptured AAA. Aortic stenosis possible but less likely given lack of AS hx, patient's age;  no murmur. PE less likely as patient does not have pleuritic pain, SOB, or ongoing symptoms to suggest massive/submassive PE capable of causing cerebral hypoperfusion and syncope and is low risk.      Labs normal. Imaging pending. No evidence of sepsis.     Patient is appropriate for further outpatient management. Discharged in stable condition with all questions answered and return precautions given. Follow up with PCP.   1753 CT Abdomen Pelvis w Contrast  There is thickening of the transverse and descending colon with  minimal associated pericolonic fat stranding, compatible with  infectious or inflammatory colitis.   1753 Patient discharged in stable condition with all questions answered return precautions given.  Diarrhea likely related to her colitis.  No specific treatment indicated   1753 Primary care follow-up in the next 1 to 2 weeks.     Procedures              Results for orders placed or performed during the hospital encounter of 01/25/22 (from the past 24 hour(s))   [place] Draw    Narrative    The following orders were created for panel order [place] Draw.  Procedure                               Abnormality          Status                     ---------                               -----------         ------                     Extra Red Top Tube[[id number]]                               Final result               Extra Green Top (Lithium...[[id number]]                      Final result               Extra Green Top (Lithium...[[id number]]                      Final result               Extra Purple Top Tube[[id number]]                            Final result                 Please view results for these tests on the individual orders.   CBC with Platelets & Differential    Narrative    The following orders were created for panel order CBC with Platelets & Differential.  Procedure                               Abnormality         Status                     ---------                               -----------         ------                     CBC with platelets and d...[[id number]]                      Final result                 Please view results for these tests on the individual orders.   Basic metabolic panel   Result Value Ref Range    Sodium 140 133 - 144 mmol/L    Potassium 3.7 3.4 - 5.3 mmol/L    Chloride 105 94 - 109 mmol/L    Carbon Dioxide (CO2) 28 20 - 32 mmol/L    Anion Gap 7 3 - 14 mmol/L    Urea Nitrogen 13 7 - 30 mg/dL    Creatinine 1.04 0.52 - 1.04 mg/dL    Calcium 9.3 8.5 - 10.1 mg/dL    Glucose 114 (H) 70 - 99 mg/dL    GFR Estimate 64 >60 mL/min/1.73m2   Extra Red Top Tube   Result Value Ref Range    Hold Specimen     Extra Green Top (Lithium Heparin) Tube   Result Value Ref Range    Hold Specimen     Extra Green Top (Lithium Heparin) Tube   Result Value Ref Range    Hold Specimen     Extra Purple Top Tube   Result Value Ref Range    Hold Specimen     CBC with platelets and differential   Result Value Ref Range    WBC Count 6.6 4.0 - 11.0 10e3/uL    RBC Count 4.48 3.80 - 5.20 10e6/uL    Hemoglobin 13.9 11.7 - 15.7 g/dL    Hematocrit 42.0 35.0 - 47.0 %    MCV 94 78 - 100 fL    MCH 31.0 26.5 - 33.0 pg    MCHC 33.1  31.5 - 36.5 g/dL    RDW 12.2 10.0 - 15.0 %    Platelet Count 236 150 - 450 10e3/uL    % Neutrophils 56 %    % Lymphocytes 29 %    % Monocytes 8 %    % Eosinophils 6 %    % Basophils 1 %    % Immature Granulocytes 0 %    NRBCs per 100 WBC 0 <1 /100    Absolute Neutrophils 3.7 1.6 - 8.3 10e3/uL    Absolute Lymphocytes 1.9 0.8 - 5.3 10e3/uL    Absolute Monocytes 0.5 0.0 - 1.3 10e3/uL    Absolute Eosinophils 0.4 0.0 - 0.7 10e3/uL    Absolute Basophils 0.1 0.0 - 0.2 10e3/uL    Absolute Immature Granulocytes 0.0 <=0.4 10e3/uL    Absolute NRBCs 0.0 10e3/uL   Hepatic panel   Result Value Ref Range    Bilirubin Total 0.3 0.2 - 1.3 mg/dL    Bilirubin Direct <0.1 0.0 - 0.2 mg/dL    Protein Total 7.4 6.8 - 8.8 g/dL    Albumin 4.2 3.4 - 5.0 g/dL    Alkaline Phosphatase 56 40 - 150 U/L    AST 26 0 - 45 U/L    ALT 43 0 - 50 U/L   Lipase   Result Value Ref Range    Lipase 64 (L) 73 - 393 U/L   Extra Tube    Narrative    The following orders were created for panel order Extra Tube.  Procedure                               Abnormality         Status                     ---------                               -----------         ------                     Extra Blue Top Tube[[id number]]                              Final result               Extra Red Top Tube[[id number]]                               Final result                 Please view results for these tests on the individual orders.   Extra Blue Top Tube   Result Value Ref Range    Hold Specimen     Extra Red Top Tube   Result Value Ref Range    Hold Specimen     Symptomatic; Yes; 1/23/2022 Influenza A/B & SARS-CoV2 (COVID-19) Virus PCR Multiplex Nasopharyngeal    Specimen: Nasopharyngeal; Swab   Result Value Ref Range    Influenza A PCR Negative Negative    Influenza B PCR Negative Negative    RSV PCR Negative Negative    SARS CoV2 PCR Negative Negative    Narrative    Testing was performed using the Xpert Xpress CoV2/Flu/RSV Assay on the [employer] Instrument. This test  should be ordered for the detection of SARS-CoV-2 and influenza viruses in individuals who meet clinical and/or epidemiological criteria. Test performance is unknown in asymptomatic patients. This test is for in vitro diagnostic use under the FDA EUA for laboratories certified under CLIA to perform high or moderate complexity testing. This test has not been FDA cleared or approved. A negative result does not rule out the presence of PCR inhibitors in the specimen or target RNA in concentration below the limit of detection for the assay. If only one viral target is positive but coinfection with multiple targets is suspected, the sample should be re-tested with another FDA cleared, approved, or authorized test, if coinfection would change clinical management. This test was validated by the [facility]. These laboratories are certified under the Clinical  Laboratory Improvement Amendments of 1988 (CLIA-88) as qualified to perform high complexity laboratory testing.   UA with Microscopic reflex to Culture    Specimen: Urine, Midstream   Result Value Ref Range    Color Urine Light Yellow Colorless, Straw, Light Yellow, Yellow    Appearance Urine Clear Clear    Glucose Urine Negative Negative mg/dL    Bilirubin Urine Negative Negative    Ketones Urine Negative Negative mg/dL    Specific Gravity Urine 1.022 1.003 - 1.035    Blood Urine Trace (A) Negative    pH Urine 6.5 4.7 - 8.0    Protein Albumin Urine 10  (A) Negative mg/dL    Urobilinogen Urine Normal Normal, 2.0 mg/dL    Nitrite Urine Negative Negative    Leukocyte Esterase Urine Negative Negative    Mucus Urine Present (A) None Seen /LPF    RBC Urine 2 <=2 /HPF    WBC Urine 1 <=5 /HPF    Squamous Epithelials Urine 1 <=1 /HPF    Hyaline Casts Urine 5 (H) <=2 /LPF    Narrative    Urine Culture not indicated   CT Abdomen Pelvis w Contrast    Narrative    Exam: CT ABDOMEN PELVIS W CONTRAST    Exam reason: Abdominal pain    Technique: Using helical CT  technique, axial images of the abdomen and  pelvis were obtained with IV contrast.  Coronal and sagittal  reconstructions also performed.    Meds/Contrast: Isovue 370 79ml Given    Comparison: 9/11/2021, 4/30/2021     FINDINGS:    ABDOMEN:    Liver: No mass or any significant abnormality.  Gallbladder: Prior cholecystectomy.   Bile Ducts: No biliary ductal dilation.   Spleen:  No splenomegaly or focal lesion.  Pancreas: No mass, ductal dilatation, or inflammatory changes.  Kidneys: No solid mass, hydronephrosis, or any definite calculi.   Adrenals:  No nodules.   Lymph Nodes: No adenopathy.   Vascular: No aortic aneurysm.   Abdominal Wall: No acute findings.     PELVIS:   No mass or adenopathy.     Bowel/Mesentery/Peritoneum:   -No bowel obstruction.   -Normal appendix.  -The transverse and descending colon appears somewhat thickened,  however this may be accentuated by their decompressed state. There is  minimal pericolonic fat stranding.  -No ascites.    Visualized portions of the Chest: No pleural effusion or significant  findings.  Musculoskeletal: No acute osseous abnormalities.       Impression    IMPRESSION:  There is thickening of the transverse and descending colon with  minimal associated pericolonic fat stranding, compatible with  infectious or inflammatory colitis.    No other acute findings in the abdomen or pelvis.    KEIRA KINCAID MD         SYSTEM ID:  [id number]       Medications   ondansetron (ZOFRAN) injection 4 mg (4 mg Intravenous Given 1/25/22 1635)   sodium chloride (PF) 0.9% PF flush 60 mL (60 mLs Intravenous Given 1/25/22 1707)   iopamidol (ISOVUE-370) solution 79 mL (79 mLs Intravenous Given 1/25/22 1707)       Assessments & Plan (with Medical Decision Making)     I have reviewed the nursing notes.    I have reviewed the findings, diagnosis, plan and need for follow up with the patient.    Discharge Medication List as of 1/25/2022  6:11 PM          Final diagnoses:   Colitis        1/25/2022   [facility]     Telly Hickey MD  01/25/22 7295

## 2022-03-04 ENCOUNTER — MEDICAL CORRESPONDENCE (OUTPATIENT)
Dept: ULTRASOUND IMAGING | Facility: HOSPITAL | Age: 57
End: 2022-03-04
Payer: COMMERCIAL

## 2022-03-29 ENCOUNTER — HOSPITAL ENCOUNTER (OUTPATIENT)
Dept: ULTRASOUND IMAGING | Facility: HOSPITAL | Age: 57
Discharge: HOME | End: 2022-03-29
Payer: COMMERCIAL

## 2022-03-29 DIAGNOSIS — R10.11 RIGHT UPPER QUADRANT PAIN: ICD-10-CM

## 2022-03-29 PROCEDURE — 76705 ECHO EXAM OF ABDOMEN: CPT

## 2022-04-08 ENCOUNTER — APPOINTMENT (OUTPATIENT)
Dept: GENERAL RADIOLOGY | Facility: HOSPITAL | Age: 57
End: 2022-04-08
Attending: PHYSICIAN ASSISTANT

## 2022-04-08 ENCOUNTER — HOSPITAL ENCOUNTER (EMERGENCY)
Facility: HOSPITAL | Age: 57
Discharge: HOME | End: 2022-04-08
Attending: PHYSICIAN ASSISTANT | Admitting: PHYSICIAN ASSISTANT

## 2022-04-08 VITALS
RESPIRATION RATE: 20 BRPM | HEART RATE: 84 BPM | TEMPERATURE: 97.6 F | DIASTOLIC BLOOD PRESSURE: 79 MMHG | OXYGEN SATURATION: 94 % | SYSTOLIC BLOOD PRESSURE: 122 MMHG

## 2022-04-08 DIAGNOSIS — S60.211A CONTUSION OF RIGHT WRIST, INITIAL ENCOUNTER: ICD-10-CM

## 2022-04-08 DIAGNOSIS — S80.01XA CONTUSION OF RIGHT KNEE, INITIAL ENCOUNTER: ICD-10-CM

## 2022-04-08 DIAGNOSIS — S39.012A BACK STRAIN, INITIAL ENCOUNTER: ICD-10-CM

## 2022-04-08 PROCEDURE — 99213 OFFICE O/P EST LOW 20 MIN: CPT | Performed by: PHYSICIAN ASSISTANT

## 2022-04-08 PROCEDURE — 73110 X-RAY EXAM OF WRIST: CPT | Mod: RT

## 2022-04-08 PROCEDURE — 73562 X-RAY EXAM OF KNEE 3: CPT | Mod: RT

## 2022-04-08 PROCEDURE — G0463 HOSPITAL OUTPT CLINIC VISIT: HCPCS

## 2022-04-08 ASSESSMENT — ENCOUNTER SYMPTOMS
JOINT SWELLING: 0
CHILLS: 0
ARTHRALGIAS: 1
MYALGIAS: 1
FEVER: 0
BACK PAIN: 1

## 2022-04-08 NOTE — ED PROVIDER NOTES
History     Chief Complaint   Patient presents with     Wrist Pain     Knee Pain     HPI  Debra Park is a 57 year old female who presents with right wrist and right knee pain. She notes this is a work comp injury, involved in a code 21 on behavioral health floor, notes patient fell on top of her. FOOSH injury to wrist and tightness to muscles in right side of back.     RHD/LHD: RHD  Pain: 4/10  Quality of pain: variable  Location: see above  Palliative: sitting still  Provocative: motion (twisting in thoracic musculature on right)  Numbness, tingling, burning: burning on right  Mechanical symptoms (locking, popping, catching): none  Bruising/edema/erythema: none  Treatments tried: none  Prior falls, injuries or trauma: none   Additional symptoms to report: none    Allergies: see EPIC    Allergies:  Allergies   Allergen Reactions     Augmentin Nausea and Vomiting     Cats Swelling       Problem List:    Patient Active Problem List    Diagnosis Date Noted     Facial cellulitis 08/29/2016     Priority: Medium        Past Medical History:    Past Medical History:   Diagnosis Date     Anxiety      Depressive disorder      GERD (gastroesophageal reflux disease)        Past Surgical History:    Past Surgical History:   Procedure Laterality Date     BREAST SURGERY      augmentation     CHOLECYSTECTOMY       ENT SURGERY      T&A     GYN SURGERY      endo ablation     ORTHOPEDIC SURGERY      rotator cuff repair     SOFT TISSUE SURGERY      basal cell ca       Family History:    Family History   Problem Relation Age of Onset     Hypertension Mother      Hypertension Father      Diabetes Father      Hyperlipidemia Father        Social History:  Marital Status:   [4]  Social History     Tobacco Use     Smoking status: Current Every Day Smoker     Packs/day: 1.00     Years: 35.00     Pack years: 35.00     Smokeless tobacco: Never Used   Substance Use Topics     Alcohol use: Yes     Comment: social     Drug use:  No        Medications:    acetaminophen-codeine (TYLENOL #3) 300-30 MG tablet  albuterol (ALBUTEROL) 108 (90 BASE) MCG/ACT Inhaler  ALPRAZolam (XANAX) 0.25 MG tablet  cholestyramine (QUESTRAN) 4 g packet  citalopram (CELEXA) 20 MG tablet  ibuprofen (ADVIL/MOTRIN) 200 MG tablet  mometasone (NASONEX) 50 MCG/ACT nasal spray      Review of Systems   Constitutional: Negative for chills and fever.   Musculoskeletal: Positive for arthralgias (right wrist and knee), back pain (right lateral) and myalgias (right wrist and knee). Negative for gait problem and joint swelling.   All other systems reviewed and are negative.    Physical Exam   BP: 122/79  Pulse: 84  Temp: 97.6  F (36.4  C)  Resp: 20  SpO2: 94 %      Physical Exam  Vitals and nursing note reviewed.   Constitutional:       Appearance: Normal appearance.   HENT:      Head: Normocephalic and atraumatic.   Cardiovascular:      Rate and Rhythm: Normal rate and regular rhythm.   Pulmonary:      Effort: Pulmonary effort is normal.   Skin:     Capillary Refill: Capillary refill takes less than 2 seconds.   Neurological:      General: No focal deficit present.      Mental Status: She is alert.       Right WRIST PHYSICAL EXAMINATION:  General Examination of the wrist: no signs of bony deformity. Skin is intact with no signs of current or previous trauma to the region.     Palpation: Non tender. No TTP any of the MCP, DIP or PIP joints.    ROM: flexion full, extension full, radial deviation full, ulnar deviation full, pronation full, supination full     Muscular atrophy: No    Neurovascular status: Sensation is intact in the radial, ulnar and median nerve distributions supplying the distal hand/fingers. German test is normal. Distal pulses 2+.     Gait: normal gait with no signs of guarding or compensation. Patient is able to get up and go from a seated position, in order to ambulate.    Appearance of the RIGHT knee: Skin is clean, dry, and non-ecchymotic. Effusion none.  Tenderness to palpation medial joint line.  Patellar tracking is normal. ROM: flexion 125, extension 0. Stable to varus, valgus, Lachman, A&P drawer ligamentous stressing. Shanna negative. Extension strength 5/5. Neurovascular status, distal pulses and sensation intact.    Appearance of the LEFT knee: Skin is clean, dry, and non-ecchymotic. Effusion none. Tenderness to palpation none.  Patellar tracking is normal. ROM: flexion 130, extension 0.  Stable to varus, valgus, Lachman, A&P drawer ligamentous stressing. Shanna negative. Extension strength 5/5. Neurovascular status, distal pulses and sensation intact.    Cervical Spine:  Inspection: normal cervical lordosis  Tenderness: no reported tenderness  ROM: Full ROM of cervical spine  Strength: Full strength of all neck muscles    Thoracic/Lumbar Spine:  Inspection:    Lordosis: Normal   Kyphosis: Normal  Tenderness: right parathoracic muscles  ROM: left lateral thoracic bending   full, right lateral thoracic bending  full, left thoracic rotation  full, right thoracic rotation  full, lumbar flexion  full, lumbar extension  full, left lateral lumbar bending  full, right lateral lumbar bending  full, left lateral lumbar rotation  full, right lateral lumbar rotation  full  Normal neurologic status    ED Course                 Procedures              Results for orders placed or performed during the hospital encounter of 04/08/22 (from the past 24 hour(s))   XR Wrist Right G/E 3 Views    Narrative    PROCEDURE:  XR WRIST RIGHT G/E 3 VIEWS    HISTORY: wrist pain    COMPARISON:  None.    TECHNIQUE:  4 views of the right wrist were obtained.    FINDINGS:  No fracture or dislocation is identified. The joint spaces  are preserved.        Impression    IMPRESSION: No acute fracture.  Normal right wrist    ISRAEL FREY MD         SYSTEM ID:  [id number]   XR Knee Right 3 Views    Narrative    PROCEDURE:  XR KNEE RIGHT 3 VIEWS    HISTORY: right knee pain    COMPARISON:   None.    TECHNIQUE:  3 views of the right knee were obtained.    FINDINGS:  There is a lucency seen in the proximal tibia at the site  of the anterior cruciate ligament graft consistent with a ganglion.  Degenerative arthritic changes are noted at the medial and lateral  femoral tibial articulations with degenerative osteophytes. Mild joint  space narrowing is seen at the medial femoral tibial articulation.  Degenerative changes are present at the patellofemoral articulation.  There is no knee effusion       Impression    IMPRESSION: Osteoarthritic changes of the right knee      ISRAEL FREY MD         SYSTEM ID:  [id number]       Medications - No data to display    Assessments & Plan (with Medical Decision Making)     I have reviewed the nursing notes.    I have reviewed the findings, diagnosis, plan and need for follow up with the patient.  Discharge Medication List as of 4/8/2022  4:19 PM        Final diagnoses:   Contusion of right wrist, initial encounter   Contusion of right knee, initial encounter   Back strain, initial encounter   Vital signs stable.  Physical exam consistent with strain of right thoracic musculature, contusion of right wrist and right knee.  XR: normal wrist and knee - right. Discussed that contusions and strains are typically self-limiting and will heal in 4 to 8 weeks duration of time.  Recommend alternating Tylenol and ibuprofen every 4-6 hours if able, do not exceed daily limits as reviewed on AVS (4000 mg of Tylenol daily, 1200 mg of ibuprofen daily), alternate heat and ice, gentle range of motion as tolerated.  If an orthopedic referral was placed patient understands that they will contact the patient directly to schedule this visit.  Patient runs into any difficulties/setbacks during recovery they should follow-up with her PCP or orthopedics for reevaluation. Patient is in agreement and understanding of the above treatment plan. All questions and concerns were addressed and  answered to patient's satisfaction. AVS reviewed with patient.     Work comp - able to return without restrictions, forms provided for patient.     Maxine Banuelos PA-C  4/8/2022   [facility]

## 2022-04-08 NOTE — ED TRIAGE NOTES
Patient presents to urgent care for a work comp injury that she was involved with on a [employer] 21 and patient fell on top of her. Patient complains of rt wrist, right knee and right flank pain.

## 2022-04-08 NOTE — ED TRIAGE NOTES
Pt was involved in a take down at work (code 21) and pt fell on top of her. Presents with right wrist, knee and rib/flank pain. Pt ambulated to triage.     Kolton Neri MSN, RN on 4/8/2022 at 3:37 PM

## 2022-05-09 ENCOUNTER — HOSPITAL ENCOUNTER (OUTPATIENT)
Dept: PHYSICAL THERAPY | Facility: HOSPITAL | Age: 57
Setting detail: THERAPIES SERIES
Discharge: HOME | End: 2022-05-09
Attending: OBSTETRICS & GYNECOLOGY
Payer: OTHER MISCELLANEOUS

## 2022-05-09 PROCEDURE — 97535 SELF CARE MNGMENT TRAINING: CPT | Mod: GP

## 2022-05-09 PROCEDURE — 97140 MANUAL THERAPY 1/> REGIONS: CPT | Mod: GP

## 2022-05-09 PROCEDURE — 97110 THERAPEUTIC EXERCISES: CPT | Mod: GP

## 2022-05-09 PROCEDURE — 97161 PT EVAL LOW COMPLEX 20 MIN: CPT | Mod: GP

## 2022-05-09 NOTE — PROGRESS NOTES
Initial Physical Therapy Evaluation      Name: Debra Park MRN# [id number]   Age: 57 year old YOB: 1965     Date of Consultation: May 9, 2022  Primary care provider: Kavon Morales    Referring Physician: Donavan Morales MD  Orders: Eval and Treat  Medical Diagnosis: Low back pain, unspecified [M54.50], Unilateral primary osteoarthritis, right knee [M17.11]  Onset of Illness/Injury: 4/8/2022 (Date of referral)    Reason for PT Visit: Patient is a 58 y/o Female who presents with right sided low back pain that started roughly a month ago during an incident at work where she was fallen on and injured her back, knee and wrist. Pt states that she has had consistent low back and radiating hip pain with no relief. Pt endorses having relief from massage therapy at [facility] here in [place] along with medications, ice, and heat. Pt has intermittent elevations in symptoms that she is able to relieve with repositioning. Pt denies any temporal relationship and finds that it is more activity related.   Pt states that her knee pain is a secondary priority to her back pain at this time.    Prior Level of Function: IND with all ADLs   Pain: 3/10  Burning, Shooting and Stabbing    Pain with coughing: yes  Pain with sneezing: yes  Pain with straining: no  Change in bowel/bladder: no  Numbness or tingling in groin area: no      Community Support/Living Environment/Employment History: Pt lives with partner and dogs and cats. Pt denies difficulty navigating home (some hesitancy with walking down steps). Pt endorses good support system at home in times of need.     Patient/Family Goal: Increased flexibility to promote increase leisure activity participation.     Fall Screen:   Have you fallen 2 or more times in the last year? No  Have you fallen and had an injury in the last year? No  Timed up & go: NT  Is patient a fall risk? No    Past Medical History:   Past Medical History:   Diagnosis Date     Anxiety   "    Depressive disorder      GERD (gastroesophageal reflux disease)        Past Surgical History:  Past Surgical History:   Procedure Laterality Date     BREAST SURGERY      augmentation     CHOLECYSTECTOMY       ENT SURGERY      T&A     GYN SURGERY      endo ablation     ORTHOPEDIC SURGERY      rotator cuff repair     SOFT TISSUE SURGERY      basal cell ca       Medications:   Current Outpatient Medications   Medication Sig     acetaminophen-codeine (TYLENOL #3) 300-30 MG tablet Take 1 tablet by mouth every 6 hours as needed for severe pain     albuterol (ALBUTEROL) 108 (90 BASE) MCG/ACT Inhaler Inhale 2 puffs into the lungs 4 times daily And PRN     ALPRAZolam (XANAX) 0.25 MG tablet      cholestyramine (QUESTRAN) 4 g packet      citalopram (CELEXA) 20 MG tablet Take 20 mg by mouth daily     ibuprofen (ADVIL/MOTRIN) 200 MG tablet Take 3 tablets (600 mg) by mouth every 6 hours as needed for pain (Patient taking differently: Take 400 mg by mouth every 6 hours as needed for pain )     mometasone (NASONEX) 50 MCG/ACT nasal spray      No current facility-administered medications for this encounter.       Imaging: None recently for this issue    Musculoskeletal Findings:    OBJECTIVE  Observation:   Palpation: Mildly TTP over lumbar paraspinals down into R hip and thigh. Good abdominal activation during bridge and posterior pelvic tilting    Gait: Mildly decreased L step length and moderate Trendelenberg sign with L hip drop.    Assistive devices: no assistive devices    Posture: Normal erect.     Neurological Assessment: Deferred    Myotomes:  Right lower extremity: Intact  Left lower extremity: Intact     Dermatomes:  Right lower extremity: Intact  Left lower extremity: Intact    Range of Motion:  Active Lumbar Spine as a percentage of expected range for age, \"*\" denotes pain       Flexion: 80* (on rising)       Extension: 100 (pressure, not pain)       Right sidebend: 60* (\"bunches up\")       Left sidebend: 80       " Right rotation: 100 (denies pain with overpressure/release)       Left rotation: 100 (denies pain with overpressure/release)  Right Lower Extremity Range of Motion: within normal limits    Left Lower Extremity Range of Motion: within normal limits    Patient's Concordant Sign: Low back pain with prolonged standing/repetetive bending    Outcome Measures:   Parker STarT Sub-Score (Q5-9): 4  Parker STarT Total Score (all 9): 8  Oswestry Score (%): 52 %     Prognosis/Plan of Care: Good for goal achievement with consistent session attendance and HEP compliance.     Appropriate for Physical Therapy Intervention: Yes     GOALS:   Short-term goals:  To be achieved in 4 weeks:    Instruct in home program  1.) Patient will be independent with a short-term home exercise program.  2.) Patient will understand biomechanical stressors of the lumbar spine in order to make modifications to activities to reduce further risk of injury.  3.) Patient will demonstrate proper body/lifting mechanics with abdominal bracing without cueing.  4.) Patient will report a 25% or greater improvement in symptoms in order to allow for increased comfort with activities of daily living.       Long-term goals:  To be achieved in 8 weeks:    Self management of symptoms  Pain free activities of daily living  Return to previous level of function  1.) Improve score on Oswestry Disability Index by 50% to correlate with clinically significant change.  2.) Patient will report a 50% or greater improvement in symptoms in order to allow for increased comfort with activities of daily living    Discharge goals: Patient will be independent with home program for self-management of symptoms.      Planned Interventions: Therapeutic exercise, manual therapy, therapeutic activity, patient education    Treatment Rendered/Intervention:  Evaluation completed as described above followed by discussion of exam findings and plan of care.    Therapeutic exercise: Patient  instructed in and demonstrated proper performance of home exercise program consisting of:      Access Code: [email or web address]  URL: [email or web address]    Date: 05/09/2022    Prepared by: Roberto Howard      Exercises:    Seated Flexion Stretch with Swiss Ball - 1 x daily - 7 x weekly - 3 sets - 10 reps    Seated Thoracic Lumbar Extension with Pectoralis Stretch - 1 x daily - 7 x weekly - 3 sets - 10 reps    Supine Posterior Pelvic Tilt - 1 x daily - 7 x weekly - 3 sets - 10 reps    Clamshell - 1 x daily - 7 x weekly - 3 sets - 10 reps    Sidelying Reverse Clamshell - 1 x daily - 7 x weekly - 3 sets - 10 reps    Educated in posture principles and neutral spine positioning. Patient was instructed in home use of heat and/or ice for pain management    Clinical Impressions:  Criteria for Skilled Therapeutic Intervention Met: Yes  PT Diagnosis: Low back pain  Influenced by the following impairments: Decreased lumbar AROM in more than one plane/ decrease core stability control  Functional limitations due to impairment: Difficulty with work, home, and leisure roles  Clinical presentation: Stable/Uncomplicated  Clinical presentation rationale: Clinical reasoning in the absence of compounding factors.  Clinical Decision making (complexity): Low Complexity  Predicted Duration of Therapy Intervention (days/wks): up to 8 weeks  Risks and Benefits of therapy have been explained: Yes  Patient, Family & other staff in agreement with plan of care: Yes  Comments: Patient presents today with signs and symptoms consistent of low back pain of mechanical presentation with nociceptive drivers and myofascial involvment. Therapy today consisted of evaluation, patient education, and therapeutic exercise. Patient would benefit from continued PT sessions addressing overall pain and dysfunction with use of appropriate interventions.  Frequency: 1 times/week tapered      Total Evaluation Time: 20 mins     Date Range: 5/9/2022 to  8/7/22    Dr. Kavon Morales, DO    Fax: [phone]    I certify the need for these services furnished under this plan of treatment and while under my care. (Physician co-signature of this document indicates review and certification of the therapy plan).      _____________________________     __________________________    ____________  Physician's Signature                 Date               Time

## 2022-05-16 ENCOUNTER — HOSPITAL ENCOUNTER (OUTPATIENT)
Dept: PHYSICAL THERAPY | Facility: HOSPITAL | Age: 57
Setting detail: THERAPIES SERIES
Discharge: HOME | End: 2022-05-16
Attending: FAMILY MEDICINE
Payer: OTHER MISCELLANEOUS

## 2022-05-16 PROCEDURE — 97110 THERAPEUTIC EXERCISES: CPT | Mod: GP

## 2022-05-16 PROCEDURE — 97140 MANUAL THERAPY 1/> REGIONS: CPT | Mod: GP

## 2022-05-19 ENCOUNTER — HOSPITAL ENCOUNTER (OUTPATIENT)
Dept: PHYSICAL THERAPY | Facility: HOSPITAL | Age: 57
Setting detail: THERAPIES SERIES
Discharge: HOME | End: 2022-05-19
Attending: OBSTETRICS & GYNECOLOGY
Payer: OTHER MISCELLANEOUS

## 2022-05-19 PROCEDURE — 97140 MANUAL THERAPY 1/> REGIONS: CPT | Mod: GP

## 2022-05-19 PROCEDURE — 97110 THERAPEUTIC EXERCISES: CPT | Mod: GP

## 2022-05-23 ENCOUNTER — HOSPITAL ENCOUNTER (OUTPATIENT)
Dept: PHYSICAL THERAPY | Facility: HOSPITAL | Age: 57
Setting detail: THERAPIES SERIES
Discharge: HOME | End: 2022-05-23
Attending: FAMILY MEDICINE
Payer: OTHER MISCELLANEOUS

## 2022-05-23 PROCEDURE — 97140 MANUAL THERAPY 1/> REGIONS: CPT | Mod: GP

## 2022-05-25 ENCOUNTER — OFFICE VISIT (OUTPATIENT)
Dept: CHIROPRACTIC MEDICINE | Facility: OTHER | Age: 57
End: 2022-05-25
Attending: CHIROPRACTOR
Payer: OTHER MISCELLANEOUS

## 2022-05-25 DIAGNOSIS — M99.02 SEGMENTAL AND SOMATIC DYSFUNCTION OF THORACIC REGION: ICD-10-CM

## 2022-05-25 DIAGNOSIS — M99.03 SEGMENTAL AND SOMATIC DYSFUNCTION OF LUMBAR REGION: Primary | ICD-10-CM

## 2022-05-25 DIAGNOSIS — M99.01 SEGMENTAL AND SOMATIC DYSFUNCTION OF CERVICAL REGION: ICD-10-CM

## 2022-05-25 DIAGNOSIS — M54.50 ACUTE BILATERAL LOW BACK PAIN WITHOUT SCIATICA: ICD-10-CM

## 2022-05-25 PROCEDURE — 99202 OFFICE O/P NEW SF 15 MIN: CPT | Mod: 25 | Performed by: CHIROPRACTOR

## 2022-05-25 PROCEDURE — 98941 CHIROPRACT MANJ 3-4 REGIONS: CPT | Mod: AT | Performed by: CHIROPRACTOR

## 2022-05-31 ENCOUNTER — HOSPITAL ENCOUNTER (OUTPATIENT)
Dept: PHYSICAL THERAPY | Facility: HOSPITAL | Age: 57
Setting detail: THERAPIES SERIES
Discharge: HOME | End: 2022-05-31
Attending: FAMILY MEDICINE
Payer: OTHER MISCELLANEOUS

## 2022-05-31 PROCEDURE — 97110 THERAPEUTIC EXERCISES: CPT | Mod: GP

## 2022-05-31 PROCEDURE — 97140 MANUAL THERAPY 1/> REGIONS: CPT | Mod: GP

## 2022-05-31 NOTE — PROGRESS NOTES
Subjective Finding:    Chief compalint: Patient presents with:  Back Pain  , Pain Scale: 6/10, Intensity: sharp, Duration: 1 weeks, Radiating: no.    Date of injury:     Activities that the pain restricts:   Home/household/hobbies/social activities: yes.  Work duties: no.  Sleep: no.  Makes symptoms better: rest.  Makes symptoms worse: activity.  Have you seen anyone else for the symptoms? No.  Work related: yes.  Automobile related injury: no.    Objective and Assessment:    Posture Analysis:   High shoulder: right.  Head tilt: right.  High iliac crest: left.  Head carriage: forward.  Thoracic Kyphosis: neutral.  Lumbar Lordosis: neutral.    Lumbar Range of Motion: extension decreased.  Cervical Range of Motion: extension decreased.  Thoracic Range of Motion: extension decreased.  Extremity Range of Motion: .    Palpation:   Quad lumb: bilateral, referred pain: no  Lev scapulae: sharp pain, referred pain: no    Segmental dysfunction pre-treatment and treatment area: C2, C5, C6, T3, L2, L3 and PSIS Left.    Assessment post-treatment:  Cervical: ROM increased.  Thoracic: ROM increased.  Lumbar: ROM increased.    Comments: .      Complicating Factors: .    Plan / Procedure:    Treatment plan: 2 times per week for 2 weeks.  Instructed patient: ice 20 minutes every other hour as needed.  Short term goals: reduce pain and increase ROM.  Long term goals: restore normal function.  Prognosis: very good.

## 2022-06-01 ENCOUNTER — OFFICE VISIT (OUTPATIENT)
Dept: CHIROPRACTIC MEDICINE | Facility: OTHER | Age: 57
End: 2022-06-01
Attending: CHIROPRACTOR
Payer: COMMERCIAL

## 2022-06-01 DIAGNOSIS — M99.02 SEGMENTAL AND SOMATIC DYSFUNCTION OF THORACIC REGION: ICD-10-CM

## 2022-06-01 DIAGNOSIS — M54.50 ACUTE BILATERAL LOW BACK PAIN WITHOUT SCIATICA: ICD-10-CM

## 2022-06-01 DIAGNOSIS — M99.03 SEGMENTAL AND SOMATIC DYSFUNCTION OF LUMBAR REGION: Primary | ICD-10-CM

## 2022-06-01 PROCEDURE — 98941 CHIROPRACT MANJ 3-4 REGIONS: CPT | Mod: AT | Performed by: CHIROPRACTOR

## 2022-06-04 ENCOUNTER — HOSPITAL ENCOUNTER (EMERGENCY)
Facility: HOSPITAL | Age: 57
Discharge: HOME | End: 2022-06-04
Attending: STUDENT IN AN ORGANIZED HEALTH CARE EDUCATION/TRAINING PROGRAM | Admitting: STUDENT IN AN ORGANIZED HEALTH CARE EDUCATION/TRAINING PROGRAM
Payer: COMMERCIAL

## 2022-06-04 VITALS
RESPIRATION RATE: 16 BRPM | HEART RATE: 58 BPM | SYSTOLIC BLOOD PRESSURE: 137 MMHG | DIASTOLIC BLOOD PRESSURE: 90 MMHG | OXYGEN SATURATION: 94 % | TEMPERATURE: 97.8 F

## 2022-06-04 DIAGNOSIS — R31.9 URINARY TRACT INFECTION WITH HEMATURIA, SITE UNSPECIFIED: ICD-10-CM

## 2022-06-04 DIAGNOSIS — N39.0 URINARY TRACT INFECTION WITH HEMATURIA, SITE UNSPECIFIED: ICD-10-CM

## 2022-06-04 LAB
ALBUMIN UR-MCNC: 30 MG/DL
APPEARANCE UR: ABNORMAL
BILIRUB UR QL STRIP: NEGATIVE
COLOR UR AUTO: YELLOW
GLUCOSE UR STRIP-MCNC: NEGATIVE MG/DL
HGB UR QL STRIP: ABNORMAL
KETONES UR STRIP-MCNC: NEGATIVE MG/DL
LEUKOCYTE ESTERASE UR QL STRIP: ABNORMAL
NITRATE UR QL: NEGATIVE
PH UR STRIP: 6 [PH] (ref 4.7–8)
RBC URINE: >182 /HPF
RENAL TUB EPI: 4 /HPF
SP GR UR STRIP: 1.01 (ref 1–1.03)
SQUAMOUS EPITHELIAL: 1 /HPF
UROBILINOGEN UR STRIP-MCNC: NORMAL MG/DL
WBC CLUMPS #/AREA URNS HPF: PRESENT /HPF
WBC URINE: >182 /HPF

## 2022-06-04 PROCEDURE — 99283 EMERGENCY DEPT VISIT LOW MDM: CPT

## 2022-06-04 PROCEDURE — 87086 URINE CULTURE/COLONY COUNT: CPT | Performed by: STUDENT IN AN ORGANIZED HEALTH CARE EDUCATION/TRAINING PROGRAM

## 2022-06-04 PROCEDURE — 99283 EMERGENCY DEPT VISIT LOW MDM: CPT | Performed by: STUDENT IN AN ORGANIZED HEALTH CARE EDUCATION/TRAINING PROGRAM

## 2022-06-04 PROCEDURE — 81001 URINALYSIS AUTO W/SCOPE: CPT | Performed by: STUDENT IN AN ORGANIZED HEALTH CARE EDUCATION/TRAINING PROGRAM

## 2022-06-04 RX ORDER — CEPHALEXIN 500 MG/1
500 CAPSULE ORAL 2 TIMES DAILY
Qty: 14 CAPSULE | Refills: 0 | Status: SHIPPED | OUTPATIENT
Start: 2022-06-04 | End: 2022-06-11

## 2022-06-04 NOTE — ED TRIAGE NOTES
Pt presents with c/o abd pain, no BM x 1 week, and woke up with UTI sx and lower back pain this morning.

## 2022-06-04 NOTE — DISCHARGE INSTRUCTIONS
- Please take the prescribed antibiotics for your symptoms. Please start taking a laxative for your constipation  - Please return to the Emergency Room if you do not improve, feel worse, or have any new or concerning symptoms. We would especially want to see you back if you experience worsening pain, fever, nausea, or vomiting  - Please follow up with a primary care physician in 2-3 days to discuss your ongoing constipation and abdominal pain

## 2022-06-06 LAB — BACTERIA UR CULT: NORMAL

## 2022-06-08 ENCOUNTER — OFFICE VISIT (OUTPATIENT)
Dept: CHIROPRACTIC MEDICINE | Facility: OTHER | Age: 57
End: 2022-06-08
Attending: CHIROPRACTOR
Payer: COMMERCIAL

## 2022-06-08 DIAGNOSIS — M99.02 SEGMENTAL AND SOMATIC DYSFUNCTION OF THORACIC REGION: Primary | ICD-10-CM

## 2022-06-08 DIAGNOSIS — M99.03 SEGMENTAL AND SOMATIC DYSFUNCTION OF LUMBAR REGION: ICD-10-CM

## 2022-06-08 DIAGNOSIS — M99.01 SEGMENTAL AND SOMATIC DYSFUNCTION OF CERVICAL REGION: ICD-10-CM

## 2022-06-08 DIAGNOSIS — M54.2 CERVICALGIA: ICD-10-CM

## 2022-06-08 PROCEDURE — 98941 CHIROPRACT MANJ 3-4 REGIONS: CPT | Mod: AT | Performed by: CHIROPRACTOR

## 2022-06-09 ENCOUNTER — HOSPITAL ENCOUNTER (OUTPATIENT)
Dept: PHYSICAL THERAPY | Facility: HOSPITAL | Age: 57
Setting detail: THERAPIES SERIES
Discharge: HOME | End: 2022-06-09
Attending: FAMILY MEDICINE
Payer: OTHER MISCELLANEOUS

## 2022-06-09 PROCEDURE — 97140 MANUAL THERAPY 1/> REGIONS: CPT | Mod: GP

## 2022-06-09 PROCEDURE — 97110 THERAPEUTIC EXERCISES: CPT | Mod: GP

## 2022-06-13 ENCOUNTER — HOSPITAL ENCOUNTER (OUTPATIENT)
Dept: PHYSICAL THERAPY | Facility: HOSPITAL | Age: 57
Setting detail: THERAPIES SERIES
Discharge: HOME | End: 2022-06-13
Attending: FAMILY MEDICINE
Payer: OTHER MISCELLANEOUS

## 2022-06-13 PROCEDURE — 97140 MANUAL THERAPY 1/> REGIONS: CPT | Mod: GP

## 2022-06-15 ENCOUNTER — OFFICE VISIT (OUTPATIENT)
Dept: CHIROPRACTIC MEDICINE | Facility: OTHER | Age: 57
End: 2022-06-15
Attending: CHIROPRACTOR
Payer: OTHER MISCELLANEOUS

## 2022-06-15 DIAGNOSIS — M99.02 SEGMENTAL AND SOMATIC DYSFUNCTION OF THORACIC REGION: ICD-10-CM

## 2022-06-15 DIAGNOSIS — M54.50 ACUTE BILATERAL LOW BACK PAIN WITHOUT SCIATICA: ICD-10-CM

## 2022-06-15 DIAGNOSIS — M99.01 SEGMENTAL AND SOMATIC DYSFUNCTION OF CERVICAL REGION: ICD-10-CM

## 2022-06-15 DIAGNOSIS — M99.03 SEGMENTAL AND SOMATIC DYSFUNCTION OF LUMBAR REGION: Primary | ICD-10-CM

## 2022-06-15 PROCEDURE — 98941 CHIROPRACT MANJ 3-4 REGIONS: CPT | Mod: AT | Performed by: CHIROPRACTOR

## 2022-06-22 ENCOUNTER — OFFICE VISIT (OUTPATIENT)
Dept: CHIROPRACTIC MEDICINE | Facility: OTHER | Age: 57
End: 2022-06-22
Attending: CHIROPRACTOR
Payer: OTHER MISCELLANEOUS

## 2022-06-22 ENCOUNTER — HOSPITAL ENCOUNTER (OUTPATIENT)
Dept: PHYSICAL THERAPY | Facility: HOSPITAL | Age: 57
Setting detail: THERAPIES SERIES
Discharge: HOME | End: 2022-06-22
Attending: FAMILY MEDICINE
Payer: OTHER MISCELLANEOUS

## 2022-06-22 DIAGNOSIS — M99.01 SEGMENTAL AND SOMATIC DYSFUNCTION OF CERVICAL REGION: ICD-10-CM

## 2022-06-22 DIAGNOSIS — M99.03 SEGMENTAL AND SOMATIC DYSFUNCTION OF LUMBAR REGION: Primary | ICD-10-CM

## 2022-06-22 DIAGNOSIS — M54.50 ACUTE RIGHT-SIDED LOW BACK PAIN WITHOUT SCIATICA: ICD-10-CM

## 2022-06-22 DIAGNOSIS — M99.02 SEGMENTAL AND SOMATIC DYSFUNCTION OF THORACIC REGION: ICD-10-CM

## 2022-06-22 PROCEDURE — 98941 CHIROPRACT MANJ 3-4 REGIONS: CPT | Mod: AT | Performed by: CHIROPRACTOR

## 2022-06-22 PROCEDURE — 97140 MANUAL THERAPY 1/> REGIONS: CPT | Mod: GP

## 2022-06-22 PROCEDURE — 97535 SELF CARE MNGMENT TRAINING: CPT | Mod: GP

## 2022-06-28 ENCOUNTER — HOSPITAL ENCOUNTER (OUTPATIENT)
Dept: PHYSICAL THERAPY | Facility: HOSPITAL | Age: 57
Setting detail: THERAPIES SERIES
Discharge: HOME | End: 2022-06-28
Attending: FAMILY MEDICINE
Payer: OTHER MISCELLANEOUS

## 2022-06-28 PROCEDURE — 97140 MANUAL THERAPY 1/> REGIONS: CPT | Mod: GP

## 2022-06-28 PROCEDURE — 97035 APP MDLTY 1+ULTRASOUND EA 15: CPT | Mod: GP

## 2022-06-29 ENCOUNTER — OFFICE VISIT (OUTPATIENT)
Dept: CHIROPRACTIC MEDICINE | Facility: OTHER | Age: 57
End: 2022-06-29
Attending: CHIROPRACTOR
Payer: OTHER MISCELLANEOUS

## 2022-06-29 DIAGNOSIS — M99.02 SEGMENTAL AND SOMATIC DYSFUNCTION OF THORACIC REGION: ICD-10-CM

## 2022-06-29 DIAGNOSIS — M54.50 ACUTE BILATERAL LOW BACK PAIN WITHOUT SCIATICA: ICD-10-CM

## 2022-06-29 DIAGNOSIS — M99.01 SEGMENTAL AND SOMATIC DYSFUNCTION OF CERVICAL REGION: ICD-10-CM

## 2022-06-29 DIAGNOSIS — M99.03 SEGMENTAL AND SOMATIC DYSFUNCTION OF LUMBAR REGION: Primary | ICD-10-CM

## 2022-06-29 PROCEDURE — 98941 CHIROPRACT MANJ 3-4 REGIONS: CPT | Mod: AT | Performed by: CHIROPRACTOR

## 2022-07-06 ENCOUNTER — OFFICE VISIT (OUTPATIENT)
Dept: CHIROPRACTIC MEDICINE | Facility: OTHER | Age: 57
End: 2022-07-06
Attending: CHIROPRACTOR
Payer: OTHER MISCELLANEOUS

## 2022-07-06 DIAGNOSIS — M99.02 SEGMENTAL AND SOMATIC DYSFUNCTION OF THORACIC REGION: ICD-10-CM

## 2022-07-06 DIAGNOSIS — M99.01 SEGMENTAL AND SOMATIC DYSFUNCTION OF CERVICAL REGION: ICD-10-CM

## 2022-07-06 DIAGNOSIS — M99.03 SEGMENTAL AND SOMATIC DYSFUNCTION OF LUMBAR REGION: Primary | ICD-10-CM

## 2022-07-06 DIAGNOSIS — M54.50 ACUTE BILATERAL LOW BACK PAIN WITHOUT SCIATICA: ICD-10-CM

## 2022-07-06 PROCEDURE — 98941 CHIROPRACT MANJ 3-4 REGIONS: CPT | Mod: AT | Performed by: CHIROPRACTOR

## 2022-07-07 ENCOUNTER — HOSPITAL ENCOUNTER (OUTPATIENT)
Dept: PHYSICAL THERAPY | Facility: HOSPITAL | Age: 57
Setting detail: THERAPIES SERIES
Discharge: HOME | End: 2022-07-07
Attending: FAMILY MEDICINE
Payer: OTHER MISCELLANEOUS

## 2022-07-07 PROCEDURE — 97140 MANUAL THERAPY 1/> REGIONS: CPT | Mod: GP

## 2022-07-07 PROCEDURE — 97035 APP MDLTY 1+ULTRASOUND EA 15: CPT | Mod: GP

## 2022-07-11 NOTE — PROGRESS NOTES
Subjective Finding:    Chief compalint: Patient presents with:  Back Pain: Getting better.  Still seeing PT and getting relief with both    , Pain Scale: 2/10, Intensity: sharp, Duration: 6 weeks, Radiating: no.    Date of injury:     Activities that the pain restricts:   Home/household/hobbies/social activities: yes.  Work duties: no.  Sleep: no.  Makes symptoms better: rest.  Makes symptoms worse: activity.  Have you seen anyone else for the symptoms? No.  Work related: yes.  Automobile related injury: no.    Objective and Assessment:    Posture Analysis:   High shoulder: right.  Head tilt: right.  High iliac crest: left.  Head carriage: forward.  Thoracic Kyphosis: neutral.  Lumbar Lordosis: neutral.    Lumbar Range of Motion: extension decreased.  Cervical Range of Motion: extension decreased.  Thoracic Range of Motion: extension decreased.  Extremity Range of Motion: .    Palpation:   Quad lumb: bilateral, referred pain: no  Lev scapulae: sharp pain, referred pain: no    Segmental dysfunction pre-treatment and treatment area: C2, C5, C6, T3, L2, L3 and PSIS Left.    Assessment post-treatment:  Cervical: ROM increased.  Thoracic: ROM increased.  Lumbar: ROM increased.    Comments: .      Complicating Factors: .    Plan / Procedure:    Treatment plan: 2 times per week for 2 weeks.  Instructed patient: ice 20 minutes every other hour as needed.  Short term goals: reduce pain and increase ROM.  Long term goals: restore normal function.  Prognosis: very good.

## 2022-07-12 ENCOUNTER — HOSPITAL ENCOUNTER (OUTPATIENT)
Dept: PHYSICAL THERAPY | Facility: HOSPITAL | Age: 57
Setting detail: THERAPIES SERIES
Discharge: HOME | End: 2022-07-12
Attending: FAMILY MEDICINE
Payer: OTHER MISCELLANEOUS

## 2022-07-12 PROCEDURE — 97035 APP MDLTY 1+ULTRASOUND EA 15: CPT | Mod: GP

## 2022-07-12 PROCEDURE — 97140 MANUAL THERAPY 1/> REGIONS: CPT | Mod: GP

## 2022-07-20 ENCOUNTER — OFFICE VISIT (OUTPATIENT)
Dept: CHIROPRACTIC MEDICINE | Facility: OTHER | Age: 57
End: 2022-07-20
Attending: CHIROPRACTOR
Payer: OTHER MISCELLANEOUS

## 2022-07-20 DIAGNOSIS — M99.03 SEGMENTAL AND SOMATIC DYSFUNCTION OF LUMBAR REGION: Primary | ICD-10-CM

## 2022-07-20 DIAGNOSIS — M99.01 SEGMENTAL AND SOMATIC DYSFUNCTION OF CERVICAL REGION: ICD-10-CM

## 2022-07-20 DIAGNOSIS — M54.50 ACUTE RIGHT-SIDED LOW BACK PAIN WITHOUT SCIATICA: ICD-10-CM

## 2022-07-20 DIAGNOSIS — M99.02 SEGMENTAL AND SOMATIC DYSFUNCTION OF THORACIC REGION: ICD-10-CM

## 2022-07-20 PROCEDURE — 98941 CHIROPRACT MANJ 3-4 REGIONS: CPT | Mod: AT | Performed by: CHIROPRACTOR

## 2022-07-21 ENCOUNTER — HOSPITAL ENCOUNTER (OUTPATIENT)
Dept: PHYSICAL THERAPY | Facility: HOSPITAL | Age: 57
Setting detail: THERAPIES SERIES
Discharge: HOME | End: 2022-07-21
Attending: OBSTETRICS & GYNECOLOGY
Payer: OTHER MISCELLANEOUS

## 2022-07-21 PROCEDURE — 97140 MANUAL THERAPY 1/> REGIONS: CPT | Mod: GP

## 2022-07-27 ENCOUNTER — HOSPITAL ENCOUNTER (OUTPATIENT)
Dept: PHYSICAL THERAPY | Facility: HOSPITAL | Age: 57
Setting detail: THERAPIES SERIES
Discharge: HOME | End: 2022-07-27
Attending: OBSTETRICS & GYNECOLOGY
Payer: OTHER MISCELLANEOUS

## 2022-07-27 PROCEDURE — 97140 MANUAL THERAPY 1/> REGIONS: CPT | Mod: GP

## 2022-07-27 NOTE — PROGRESS NOTES
Subjective Finding:    Chief compalint: Patient presents with:  Back Pain: Neck pain    , Pain Scale: 2/10, Intensity: sharp, Duration: 6 weeks, Radiating: no.    Date of injury:     Activities that the pain restricts:   Home/household/hobbies/social activities: yes.  Work duties: no.  Sleep: no.  Makes symptoms better: rest.  Makes symptoms worse: activity.  Have you seen anyone else for the symptoms? No.  Work related: yes.  Automobile related injury: no.    Objective and Assessment:    Posture Analysis:   High shoulder: right.  Head tilt: right.  High iliac crest: left.  Head carriage: forward.  Thoracic Kyphosis: neutral.  Lumbar Lordosis: neutral.    Lumbar Range of Motion: extension decreased.  Cervical Range of Motion: extension decreased.  Thoracic Range of Motion: extension decreased.  Extremity Range of Motion: .    Palpation:   Quad lumb: bilateral, referred pain: no  Lev scapulae: sharp pain, referred pain: no    Segmental dysfunction pre-treatment and treatment area: C2, C5, C6, T3, L2, L3 and PSIS Left.    Assessment post-treatment:  Cervical: ROM increased.  Thoracic: ROM increased.  Lumbar: ROM increased.    Comments: .      Complicating Factors: .    Plan / Procedure:    Treatment plan: 2 times per week for 2 weeks.  Instructed patient: ice 20 minutes every other hour as needed.  Short term goals: reduce pain and increase ROM.  Long term goals: restore normal function.  Prognosis: very good.

## 2022-08-03 ENCOUNTER — HOSPITAL ENCOUNTER (OUTPATIENT)
Dept: PHYSICAL THERAPY | Facility: HOSPITAL | Age: 57
Setting detail: THERAPIES SERIES
Discharge: HOME | End: 2022-08-03
Attending: FAMILY MEDICINE
Payer: OTHER MISCELLANEOUS

## 2022-08-03 PROCEDURE — 97535 SELF CARE MNGMENT TRAINING: CPT | Mod: GP

## 2022-08-10 ENCOUNTER — HOSPITAL ENCOUNTER (OUTPATIENT)
Dept: PHYSICAL THERAPY | Facility: HOSPITAL | Age: 57
Setting detail: THERAPIES SERIES
Discharge: HOME | End: 2022-08-10
Attending: FAMILY MEDICINE
Payer: OTHER MISCELLANEOUS

## 2022-08-10 PROCEDURE — 97140 MANUAL THERAPY 1/> REGIONS: CPT | Mod: GP

## 2022-08-24 ENCOUNTER — HOSPITAL ENCOUNTER (OUTPATIENT)
Dept: PHYSICAL THERAPY | Facility: HOSPITAL | Age: 57
Setting detail: THERAPIES SERIES
Discharge: HOME | End: 2022-08-24
Attending: FAMILY MEDICINE
Payer: OTHER MISCELLANEOUS

## 2022-08-24 PROCEDURE — 999N000214 HC STATISTIC PT OUTPT VISIT

## 2022-10-11 ENCOUNTER — HOSPITAL ENCOUNTER (EMERGENCY)
Facility: HOSPITAL | Age: 57
Discharge: HOME | End: 2022-10-11
Attending: NURSE PRACTITIONER | Admitting: NURSE PRACTITIONER
Payer: COMMERCIAL

## 2022-10-11 ENCOUNTER — APPOINTMENT (OUTPATIENT)
Dept: GENERAL RADIOLOGY | Facility: HOSPITAL | Age: 57
End: 2022-10-11
Attending: NURSE PRACTITIONER
Payer: COMMERCIAL

## 2022-10-11 VITALS
HEART RATE: 67 BPM | OXYGEN SATURATION: 96 % | WEIGHT: 160 LBS | TEMPERATURE: 98.1 F | DIASTOLIC BLOOD PRESSURE: 87 MMHG | RESPIRATION RATE: 18 BRPM | BODY MASS INDEX: 27.46 KG/M2 | SYSTOLIC BLOOD PRESSURE: 133 MMHG

## 2022-10-11 DIAGNOSIS — J20.8 ACUTE BRONCHITIS DUE TO COVID-19 VIRUS: Primary | ICD-10-CM

## 2022-10-11 DIAGNOSIS — R53.83 FATIGUE: ICD-10-CM

## 2022-10-11 DIAGNOSIS — U07.1 ACUTE BRONCHITIS DUE TO COVID-19 VIRUS: Primary | ICD-10-CM

## 2022-10-11 PROCEDURE — 71046 X-RAY EXAM CHEST 2 VIEWS: CPT

## 2022-10-11 PROCEDURE — G0463 HOSPITAL OUTPT CLINIC VISIT: HCPCS | Mod: 25

## 2022-10-11 PROCEDURE — 99213 OFFICE O/P EST LOW 20 MIN: CPT | Performed by: NURSE PRACTITIONER

## 2022-10-11 RX ORDER — PREDNISONE 20 MG/1
TABLET ORAL
Qty: 10 TABLET | Refills: 0 | Status: SHIPPED | OUTPATIENT
Start: 2022-10-11

## 2022-10-11 RX ORDER — NICOTINE POLACRILEX 4 MG/1
20 GUM, CHEWING ORAL DAILY
COMMUNITY

## 2022-10-11 ASSESSMENT — ENCOUNTER SYMPTOMS
DIARRHEA: 1
BACK PAIN: 1
MYALGIAS: 1
VOMITING: 0
COUGH: 1
ACTIVITY CHANGE: 1
SHORTNESS OF BREATH: 1
FATIGUE: 1
APPETITE CHANGE: 1
ABDOMINAL PAIN: 0
FEVER: 0
CHILLS: 0
NAUSEA: 0

## 2022-10-11 NOTE — ED PROVIDER NOTES
History     Chief Complaint   Patient presents with     Shortness of Breath     Chest Wall Pain     HPI  Debra Park is a 57 year old female who presents to urgent care with concerns of persistent COVID-19 infection symptoms.  Patient tells me that she tested positive for COVID-19 virus 1 week ago.  She is currently on day 8 of her symptoms.  She reports chest pressure, upper back pain, shortness of breath, fatigue, nonproductive cough and body aches.  Patient tells me that she was prescribed Paxlovid and finished taking that yesterday.  Some of her symptoms improved slightly.  Current tobacco use.  No history of lung diseases.  No significant past medical history.    She has a decreased appetite but notes is drinking fluids very well.  Slight diarrhea.  No abdominal pain, nausea or vomiting.    Allergies:  Allergies   Allergen Reactions     Augmentin Nausea and Vomiting     Cats Swelling     Other Environmental Allergy      hay       Problem List:    Patient Active Problem List    Diagnosis Date Noted     Facial cellulitis 08/29/2016     Priority: Medium        Past Medical History:    Past Medical History:   Diagnosis Date     Anxiety      Depressive disorder      GERD (gastroesophageal reflux disease)        Past Surgical History:    Past Surgical History:   Procedure Laterality Date     BREAST SURGERY      augmentation     CHOLECYSTECTOMY       ENT SURGERY      T&A     GYN SURGERY      endo ablation     ORTHOPEDIC SURGERY      rotator cuff repair     SOFT TISSUE SURGERY      basal cell ca       Family History:    Family History   Problem Relation Age of Onset     Hypertension Mother      Hypertension Father      Diabetes Father      Hyperlipidemia Father        Social History:  Marital Status:   [4]  Social History     Tobacco Use     Smoking status: Every Day     Packs/day: 1.00     Years: 35.00     Pack years: 35.00     Types: Cigarettes     Smokeless tobacco: Never   Substance Use Topics      Alcohol use: Yes     Comment: social     Drug use: No        Medications:    acetaminophen-codeine (TYLENOL #3) 300-30 MG tablet  albuterol (ALBUTEROL) 108 (90 BASE) MCG/ACT Inhaler  ALPRAZolam (XANAX) 0.25 MG tablet  cholestyramine (QUESTRAN) 4 g packet  citalopram (CELEXA) 20 MG tablet  ibuprofen (ADVIL/MOTRIN) 200 MG tablet  mometasone (NASONEX) 50 MCG/ACT nasal spray  omeprazole 20 MG tablet  predniSONE (DELTASONE) 20 MG tablet          Review of Systems   Constitutional: Positive for activity change, appetite change and fatigue. Negative for chills and fever.   Respiratory: Positive for cough and shortness of breath.    Cardiovascular: Positive for chest pain.   Gastrointestinal: Positive for diarrhea. Negative for abdominal pain, nausea and vomiting.   Musculoskeletal: Positive for back pain and myalgias.   All other systems reviewed and are negative.      Physical Exam   BP: 133/87  Pulse: 67  Temp: 98.1  F (36.7  C)  Resp: 18  Weight: 72.6 kg (160 lb)  SpO2: 96 %      Physical Exam  Vitals and nursing note reviewed.   Constitutional:       Appearance: She is well-developed. She is not ill-appearing or toxic-appearing.   HENT:      Head: Atraumatic.      Mouth/Throat:      Mouth: Mucous membranes are moist.   Eyes:      Pupils: Pupils are equal, round, and reactive to light.   Cardiovascular:      Rate and Rhythm: Normal rate and regular rhythm.  No extrasystoles are present.     Pulses: No decreased pulses.      Heart sounds: No murmur heard.    No friction rub. No gallop.   Pulmonary:      Effort: Pulmonary effort is normal. No tachypnea, bradypnea or respiratory distress.      Breath sounds: No stridor. No decreased breath sounds, wheezing, rhonchi or rales.   Abdominal:      General: Bowel sounds are normal.      Palpations: Abdomen is soft.      Tenderness: There is no abdominal tenderness.   Musculoskeletal:      Cervical back: Neck supple.   Skin:     General: Skin is warm and dry.      Capillary  Refill: Capillary refill takes less than 2 seconds.   Neurological:      General: No focal deficit present.      Mental Status: She is alert and oriented to person, place, and time.         ED Course                 Procedures              Results for orders placed or performed during the hospital encounter of 10/11/22 (from the past 24 hour(s))   XR Chest 2 Views    Narrative    Procedure:XR CHEST 2 VIEWS    Clinical history:Female, 57 years, cough, chest heaviness and  shortness of breath, covid+ 1 week    Technique: Two views are submitted.    Comparison: 9/11/2021    Findings: The cardiac silhouette is normal. The pulmonary vasculature  is normal.    The lungs are clear. Bony structures are unremarkable.      Impression    Impression:   No acute abnormality. No evidence of acute or active cardiopulmonary  disease.    KRISTEL ARMANDO MD         SYSTEM ID:  [id number]       Medications - No data to display    Assessments & Plan (with Medical Decision Making)     I have reviewed the nursing notes.    57-year-old female diagnosed with COVID-19 infection 1 week ago and subsequently prescribed Paxlovid who presented today with concerns of persistent symptoms.  Patient does notes his symptoms have slightly improved since taking Paxlovid.  She is concerned about the ongoing chest pain, shortness of breath along with some fatigue.  Current tobacco use.  Her heart rate and rhythm are regular.  Her respirations are nonlabored.  Her vital signs are within normal limits with oxygen saturation 96% on room air.  She is talking in full sentences.  She notes that she is tolerating oral fluids very well.    Chest x-ray today is negative for any acute findings.  This was discussed with patient.  Suspect her symptoms are due to an acute bronchitis due to the COVID-19 infection.  We will prescribe a prednisone burst.  Advised patient to continue using the albuterol inhaler that she has at home.  Tylenol or ibuprofen as needed for  pain.  Continue drinking plenty of fluids but consider fluids with some nutrients if not eating solid food as much.  Follow-up with primary medical provider if no improvement in symptoms.  Return to ED/UC for worsening or concerning symptoms.      I have reviewed the findings, diagnosis, plan and need for follow up with the patient.  This document was prepared using a combination of typing and voice generated software.  While every attempt was made for accuracy, spelling and grammatical errors may exist.    New Prescriptions    PREDNISONE (DELTASONE) 20 MG TABLET    Take two tablets (= 40mg) each day for 5 (five) days       Final diagnoses:   Fatigue   Acute bronchitis due to COVID-19 virus       10/11/2022   [facility]     Mpofu, Prudence, CNP  10/11/22 [id number]

## 2022-10-11 NOTE — ED TRIAGE NOTES
"\"Diagnosed with Covid last Wed and was put on paxlovid.  Still having shortness of breath and chest wall pain from coughing.\"       "

## 2022-10-11 NOTE — ED TRIAGE NOTES
Pt presents with c/o Covid positive on wed, took paxlovid, that was finished yesterday. Still fatigued, unproductive cough, body aches, feels SOB 02 is 96% RA.  States that she just doesn't seem to be getting better.  Took  IBU  and Tyl #3 today.

## 2022-10-11 NOTE — DISCHARGE INSTRUCTIONS
Take the prednisone as prescribed.  Use inhaler as needed for shortness of breath.    Continue drinking plenty of fluids and include electrolyte rich fluids.    Follow-up with your doctor in 1 week for reevaluation if no improvement in symptoms.    Return to the emergency department for any worsening or concerning symptoms.

## 2022-11-09 ENCOUNTER — APPOINTMENT (OUTPATIENT)
Dept: OCCUPATIONAL MEDICINE | Facility: OTHER | Age: 57
End: 2022-11-09

## 2024-07-10 ENCOUNTER — OFFICE VISIT (OUTPATIENT)
Dept: CHIROPRACTIC MEDICINE | Facility: OTHER | Age: 59
End: 2024-07-10
Attending: CHIROPRACTOR
Payer: COMMERCIAL

## 2024-07-10 DIAGNOSIS — M99.02 SEGMENTAL AND SOMATIC DYSFUNCTION OF THORACIC REGION: ICD-10-CM

## 2024-07-10 DIAGNOSIS — M54.2 CERVICALGIA: ICD-10-CM

## 2024-07-10 DIAGNOSIS — M99.01 SEGMENTAL AND SOMATIC DYSFUNCTION OF CERVICAL REGION: Primary | ICD-10-CM

## 2024-07-10 PROCEDURE — 98940 CHIROPRACT MANJ 1-2 REGIONS: CPT | Mod: AT | Performed by: CHIROPRACTOR

## 2024-07-10 PROCEDURE — 99212 OFFICE O/P EST SF 10 MIN: CPT | Mod: 25 | Performed by: CHIROPRACTOR

## 2024-07-15 ENCOUNTER — OFFICE VISIT (OUTPATIENT)
Dept: CHIROPRACTIC MEDICINE | Facility: OTHER | Age: 59
End: 2024-07-15
Attending: CHIROPRACTOR
Payer: COMMERCIAL

## 2024-07-15 DIAGNOSIS — M54.2 CERVICALGIA: ICD-10-CM

## 2024-07-15 DIAGNOSIS — M99.01 SEGMENTAL AND SOMATIC DYSFUNCTION OF CERVICAL REGION: Primary | ICD-10-CM

## 2024-07-15 DIAGNOSIS — M99.02 SEGMENTAL AND SOMATIC DYSFUNCTION OF THORACIC REGION: ICD-10-CM

## 2024-07-15 PROCEDURE — 98940 CHIROPRACT MANJ 1-2 REGIONS: CPT | Mod: AT | Performed by: CHIROPRACTOR

## 2024-07-16 NOTE — PROGRESS NOTES
Subjective Finding:    Chief compalint: Patient presents with:  Neck Pain: Sharp neck pain   , Pain Scale: 6/10, Intensity: sharp, Duration: 1 months, Radiating: no.    Date of injury:     Activities that the pain restricts:   Home/household/hobbies/social activities: Yes.  Work duties: Yes.  Sleep: Yes.  Makes symptoms better: rest.  Makes symptoms worse: cervical extension and cervical flexion.  Have you seen anyone else for the symptoms? No.  Work related: No.  Automobile related injury: No.    Objective and Assessment:    Posture Analysis:   High shoulder: .  Head tilt: .  High iliac crest: .  Head carriage: forward.  Thoracic Kyphosis: neutral.  Lumbar Lordosis: neutral.    Lumbar Range of Motion: .  Cervical Range of Motion: extension decreased.  Thoracic Range of Motion: .  Extremity Range of Motion: .    Palpation:   Traps: sharp pain and stiff, referred pain: no    Segmental dysfunction pre-treatment and treatment area: C4, C6, and T3.    Assessment post-treatment:  Cervical: ROM increased.  Thoracic: ROM increased.  Lumbar: .    Comments: .      Complicating Factors: .    Procedure(s):  CMT:  13645 Chiropractic manipulative treatment 1-2 regions performed   Cervical: Diversified, See above for level, Supine and Thoracic: Diversified, See above for level, Prone    Modalities:  None performed this visit    Therapeutic procedures:  None    Plan:  Treatment plan: 2 times 2 weeks.  Instructed patient: .  Short term goals: reduce pain.  Long term goals: restore normal function.  Prognosis: very good.

## 2024-07-17 ENCOUNTER — OFFICE VISIT (OUTPATIENT)
Dept: CHIROPRACTIC MEDICINE | Facility: OTHER | Age: 59
End: 2024-07-17
Attending: CHIROPRACTOR
Payer: COMMERCIAL

## 2024-07-17 DIAGNOSIS — M54.2 CERVICALGIA: ICD-10-CM

## 2024-07-17 DIAGNOSIS — M99.02 SEGMENTAL AND SOMATIC DYSFUNCTION OF THORACIC REGION: ICD-10-CM

## 2024-07-17 DIAGNOSIS — M99.01 SEGMENTAL AND SOMATIC DYSFUNCTION OF CERVICAL REGION: Primary | ICD-10-CM

## 2024-07-17 PROCEDURE — 98940 CHIROPRACT MANJ 1-2 REGIONS: CPT | Mod: AT | Performed by: CHIROPRACTOR

## 2024-07-18 NOTE — PROGRESS NOTES
Subjective Finding:    Chief compalint: Patient presents with:  Neck Pain: Getting some relief , Pain Scale: 4/10, Intensity: dull, Duration: 2 weeks, Radiating: no.    Date of injury:     Activities that the pain restricts:   Home/household/hobbies/social activities: Yes.  Work duties: No.  Sleep: No.  Makes symptoms better: rest.  Makes symptoms worse: cervical extension and cervical flexion.  Have you seen anyone else for the symptoms? No.  Work related: No.  Automobile related injury: No.    Objective and Assessment:    Posture Analysis:   High shoulder: .  Head tilt: .  High iliac crest: .  Head carriage: forward.  Thoracic Kyphosis: neutral.  Lumbar Lordosis: neutral.    Lumbar Range of Motion: .  Cervical Range of Motion: extension decreased.  Thoracic Range of Motion: .  Extremity Range of Motion: .    Palpation:   Lev scapulae: sharp pain, referred pain: no    Segmental dysfunction pre-treatment and treatment area: C4, C5, and T4.    Assessment post-treatment:  Cervical: ROM increased.  Thoracic: ROM increased.  Lumbar: .    Comments: .      Complicating Factors: .    Procedure(s):  CMT:  59367 Chiropractic manipulative treatment 1-2 regions performed   Cervical: Diversified, See above for level, Supine and Thoracic: Diversified, See above for level, Prone    Modalities:  None performed this visit    Therapeutic procedures:  None    Plan:  Treatment plan: PRN.  Instructed patient: walk 10 minutes.  Short term goals: increase ROM.  Long term goals: increase ADL.  Prognosis: excellent.

## 2024-07-23 NOTE — PROGRESS NOTES
Subjective Finding:    Chief compalint: Patient presents with:  Neck Pain: Neck is better , Pain Scale: 3/10, Intensity: sharp, Duration: 3 weeks, Radiating: no.    Date of injury:     Activities that the pain restricts:   Home/household/hobbies/social activities: Yes.  Work duties: No.  Sleep: No.  Makes symptoms better: rest.  Makes symptoms worse: cervical flexion.  Have you seen anyone else for the symptoms? No.  Work related: No.  Automobile related injury: No.    Objective and Assessment:    Posture Analysis:   High shoulder: .  Head tilt: .  High iliac crest: .  Head carriage: neutral.  Thoracic Kyphosis: neutral.  Lumbar Lordosis: neutral.    Lumbar Range of Motion: .  Cervical Range of Motion: extension decreased.  Thoracic Range of Motion: .  Extremity Range of Motion: .    Palpation:   Traps: sharp pain, referred pain: no    Segmental dysfunction pre-treatment and treatment area: C5, C7, and T4.    Assessment post-treatment:  Cervical: ROM increased.  Thoracic: ROM increased.  Lumbar: .    Comments: .      Complicating Factors: .    Procedure(s):  CMT:  87209 Chiropractic manipulative treatment 1-2 regions performed   Cervical: Diversified, See above for level, Supine and Thoracic: Diversified, See above for level, Prone    Modalities:  None performed this visit    Therapeutic procedures:  None    Plan:  Treatment plan: PRN.  Instructed patient: walk 10 minutes.  Short term goals: reduce pain.  Long term goals: increase ADL.  Prognosis: very good.

## 2024-07-24 ENCOUNTER — OFFICE VISIT (OUTPATIENT)
Dept: CHIROPRACTIC MEDICINE | Facility: OTHER | Age: 59
End: 2024-07-24
Attending: CHIROPRACTOR
Payer: COMMERCIAL

## 2024-07-24 DIAGNOSIS — M99.02 SEGMENTAL AND SOMATIC DYSFUNCTION OF THORACIC REGION: ICD-10-CM

## 2024-07-24 DIAGNOSIS — M54.2 CERVICALGIA: ICD-10-CM

## 2024-07-24 DIAGNOSIS — M99.01 SEGMENTAL AND SOMATIC DYSFUNCTION OF CERVICAL REGION: Primary | ICD-10-CM

## 2024-07-24 PROCEDURE — 98940 CHIROPRACT MANJ 1-2 REGIONS: CPT | Mod: AT | Performed by: CHIROPRACTOR

## 2024-07-31 NOTE — PROGRESS NOTES
Subjective Finding:    Chief compalint: Patient presents with:  Back Pain: Upper back and neck pain   , Pain Scale: 2/10, Intensity: sharp, Duration: 2 weeks, Radiating: no.    Date of injury:     Activities that the pain restricts:   Home/household/hobbies/social activities: Yes.  Work duties: No.  Sleep: No.  Makes symptoms better: rest.  Makes symptoms worse: cervical extension and cervical flexion.  Have you seen anyone else for the symptoms? No.  Work related: No.  Automobile related injury: No.    Objective and Assessment:    Posture Analysis:   High shoulder: .  Head tilt: .  High iliac crest: .  Head carriage: forward.  Thoracic Kyphosis: neutral.  Lumbar Lordosis: neutral.    Lumbar Range of Motion: .  Cervical Range of Motion: left lateral flexion decreased and right lateral flexion decreased.  Thoracic Range of Motion: .  Extremity Range of Motion: .    Palpation:   Lev scapulae: sharp pain, referred pain: no    Segmental dysfunction pre-treatment and treatment area: C5, C6, and T2.    Assessment post-treatment:  Cervical: ROM increased.  Thoracic: ROM increased.  Lumbar: .    Comments: .      Complicating Factors: .    Procedure(s):  [facility]:  27546 Chiropractic manipulative treatment 1-2 regions performed   Cervical: Diversified, See above for level, Supine and Thoracic: Diversified, See above for level, Supine    Modalities:  None performed this visit    Therapeutic procedures:  None    Plan:  Treatment plan: PRN.  Instructed patient: walk 10 minutes.  Short term goals: reduce pain.  Long term goals: increase ADL.  Prognosis: good.

## 2024-08-08 ENCOUNTER — TRANSFERRED RECORDS (OUTPATIENT)
Dept: HEALTH INFORMATION MANAGEMENT | Facility: CLINIC | Age: 59
End: 2024-08-08

## 2024-08-09 ENCOUNTER — TRANSFERRED RECORDS (OUTPATIENT)
Dept: HEALTH INFORMATION MANAGEMENT | Facility: CLINIC | Age: 59
End: 2024-08-09

## 2024-08-13 ENCOUNTER — MEDICAL CORRESPONDENCE (OUTPATIENT)
Dept: HEALTH INFORMATION MANAGEMENT | Facility: HOSPITAL | Age: 59
End: 2024-08-13

## 2024-08-20 ENCOUNTER — MEDICAL CORRESPONDENCE (OUTPATIENT)
Dept: INTERVENTIONAL RADIOLOGY/VASCULAR | Facility: HOSPITAL | Age: 59
End: 2024-08-20

## 2024-08-26 ENCOUNTER — TELEPHONE (OUTPATIENT)
Dept: INTERVENTIONAL RADIOLOGY/VASCULAR | Facility: HOSPITAL | Age: 59
End: 2024-08-26

## 2024-08-26 NOTE — TELEPHONE ENCOUNTER
Left a message to remind patient of their thomas on 8/29. Also reminded patient to not take any antibiotics, steroids, or immunizations two weeks before and after this appt. And they need a .

## 2024-08-29 ENCOUNTER — HOSPITAL ENCOUNTER (OUTPATIENT)
Dept: GENERAL RADIOLOGY | Facility: HOSPITAL | Age: 59
Discharge: HOME | End: 2024-08-29
Attending: FAMILY MEDICINE
Payer: COMMERCIAL

## 2024-08-29 ENCOUNTER — HOSPITAL ENCOUNTER (OUTPATIENT)
Facility: HOSPITAL | Age: 59
Discharge: HOME | End: 2024-08-29
Attending: RADIOLOGY | Admitting: RADIOLOGY
Payer: COMMERCIAL

## 2024-08-29 DIAGNOSIS — M54.2 CERVICALGIA: ICD-10-CM

## 2024-08-29 PROCEDURE — 250N000011 HC RX IP 250 OP 636: Performed by: RADIOLOGY

## 2024-08-29 PROCEDURE — 64490 INJ PARAVERT F JNT C/T 1 LEV: CPT | Mod: LT

## 2024-08-29 PROCEDURE — 250N000009 HC RX 250: Performed by: RADIOLOGY

## 2024-08-29 RX ORDER — TRIAMCINOLONE ACETONIDE 40 MG/ML
40 INJECTION, SUSPENSION INTRA-ARTICULAR; INTRAMUSCULAR ONCE
Status: COMPLETED | OUTPATIENT
Start: 2024-08-29 | End: 2024-08-29

## 2024-08-29 RX ORDER — LIDOCAINE HYDROCHLORIDE 10 MG/ML
10 INJECTION, SOLUTION EPIDURAL; INFILTRATION; INTRACAUDAL; PERINEURAL ONCE
Status: COMPLETED | OUTPATIENT
Start: 2024-08-29 | End: 2024-08-29

## 2024-08-29 RX ORDER — IOPAMIDOL 612 MG/ML
15 INJECTION, SOLUTION INTRATHECAL ONCE
Status: COMPLETED | OUTPATIENT
Start: 2024-08-29 | End: 2024-08-29

## 2024-08-29 RX ADMIN — TRIAMCINOLONE ACETONIDE 40 MG: 40 INJECTION, SUSPENSION INTRA-ARTICULAR; INTRAMUSCULAR at 09:44

## 2024-08-29 RX ADMIN — IOPAMIDOL 1 ML: 612 INJECTION, SOLUTION INTRATHECAL at 09:45

## 2024-08-29 RX ADMIN — LIDOCAINE HYDROCHLORIDE 5 ML: 10 INJECTION, SOLUTION EPIDURAL; INFILTRATION; INTRACAUDAL; PERINEURAL at 09:44

## 2024-08-29 ASSESSMENT — ACTIVITIES OF DAILY LIVING (ADL)
ADLS_ACUITY_SCORE: 35
ADLS_ACUITY_SCORE: 35

## 2024-08-29 NOTE — DISCHARGE INSTRUCTIONS
Home number on file [phone] (home)  Is it ok to leave a message at this number(s)? Yes    Dr. Haile completed your procedure on 8/29/2024.    Current Pain Level (0-10 Scale): 4/10  Post Pain Level (0-10):  2/10    Radiology Discharge instructions for Steroid Injection    Activity Level:     Do not do any heavy activity or exercise for 24 hours.   Do not drive for 4 hours after your injection.  Diet:   Return to your normal diet.  Medications:   If you have stopped taking your Aspirin, Coumadin/Warfarin, Ibuprofen, or any   other blood thinner for this procedure you may resume in the morning unless   your primary care provider has given you other instructions.    Diabetics may see an increase in blood sugar after steroid injections. If you are concerned about your blood sugar, please contact your family doctor.    Site Care:  Remove the bandage and bathe or shower the morning after the procedure.      Please allow two weeks to experience improvement in your pain.  If you have any further issues, please contact your provider.    Call your Primary Care Provider if you have the following (if your primary care provider is not available please seek emergency care):   Nausea with vomiting   Severe headache   Drowsiness or confusion   Redness or drainage at the injection or puncture site   Temperature over 101 degrees F   Other concerns   Worsening back / neck pain   Stiff neck

## 2024-09-11 ENCOUNTER — TELEPHONE (OUTPATIENT)
Dept: INTERVENTIONAL RADIOLOGY/VASCULAR | Facility: HOSPITAL | Age: 59
End: 2024-09-11

## 2024-09-11 NOTE — TELEPHONE ENCOUNTER
INJECTION POST CALL    Procedure: FACET  Radiologist(s): Dr. Donavan Haile  Date of Procedure:  8/29/24    Relief of pain from this injection    A = 90%  A- = 85%  B = 80%  B- =75%  C = 70%  C- = 65%  D = 60%  D- = 50%  F = less than 50%      Do you feel this injection was beneficial? Yes, patient is feeling 80% better    If yes, how long did it last? Patient is still feeling this relief. Her right side is acting up     Instruct patient to follow up with their provider for any further care they may need. (as Dr. Haile will no longer be making recommendations nor addended the exam with recommmendations)    Svetlana De La Paz

## 2024-10-07 ENCOUNTER — TRANSFERRED RECORDS (OUTPATIENT)
Dept: HEALTH INFORMATION MANAGEMENT | Facility: CLINIC | Age: 59
End: 2024-10-07

## 2024-10-07 ENCOUNTER — MEDICAL CORRESPONDENCE (OUTPATIENT)
Dept: CT IMAGING | Facility: HOSPITAL | Age: 59
End: 2024-10-07

## 2024-10-08 ENCOUNTER — MEDICAL CORRESPONDENCE (OUTPATIENT)
Dept: INTERVENTIONAL RADIOLOGY/VASCULAR | Facility: HOSPITAL | Age: 59
End: 2024-10-08

## 2024-10-30 ENCOUNTER — TRANSCRIBE ORDERS (OUTPATIENT)
Dept: OTHER | Age: 59
End: 2024-10-30

## 2024-10-30 DIAGNOSIS — M54.2 CERVICAL PAIN: Primary | ICD-10-CM

## 2024-10-31 ENCOUNTER — HOSPITAL ENCOUNTER (EMERGENCY)
Facility: HOSPITAL | Age: 59
Discharge: HOME | End: 2024-10-31
Attending: PHYSICIAN ASSISTANT | Admitting: PHYSICIAN ASSISTANT
Payer: COMMERCIAL

## 2024-10-31 VITALS
TEMPERATURE: 98.2 F | SYSTOLIC BLOOD PRESSURE: 153 MMHG | WEIGHT: 168 LBS | BODY MASS INDEX: 28.68 KG/M2 | HEART RATE: 70 BPM | OXYGEN SATURATION: 96 % | HEIGHT: 64 IN | DIASTOLIC BLOOD PRESSURE: 92 MMHG | RESPIRATION RATE: 16 BRPM

## 2024-10-31 DIAGNOSIS — M54.2 NECK PAIN ON LEFT SIDE: ICD-10-CM

## 2024-10-31 PROCEDURE — G0463 HOSPITAL OUTPT CLINIC VISIT: HCPCS

## 2024-10-31 PROCEDURE — 99213 OFFICE O/P EST LOW 20 MIN: CPT | Performed by: PHYSICIAN ASSISTANT

## 2024-10-31 RX ORDER — ONDANSETRON 4 MG/1
4 TABLET, FILM COATED ORAL EVERY 8 HOURS PRN
Qty: 18 TABLET | Refills: 0 | Status: SHIPPED | OUTPATIENT
Start: 2024-10-31 | End: 2024-11-06

## 2024-10-31 RX ORDER — HYDROCODONE BITARTRATE AND ACETAMINOPHEN 5; 325 MG/1; MG/1
1 TABLET ORAL EVERY 6 HOURS PRN
Qty: 24 TABLET | Refills: 0 | Status: SHIPPED | OUTPATIENT
Start: 2024-10-31 | End: 2024-11-06

## 2024-10-31 RX ORDER — GABAPENTIN 100 MG/1
CAPSULE ORAL
COMMUNITY
Start: 2024-09-23

## 2024-10-31 ASSESSMENT — ENCOUNTER SYMPTOMS: NECK PAIN: 1

## 2024-10-31 NOTE — TELEPHONE ENCOUNTER
Action    Action Taken Request sent to [facility] for referral notes and imaging       SPINE PATIENTS - NEW PROTOCOL PREVISIT    RECORDS RECEIVED FROM: referred by Kavon Lozano   REASON FOR VISIT: Cervical pain   PROVIDER: Lois   DATE OF APPT: 11/14/2024   NOTES (FOR ALL VISITS) STATUS DETAILS   OFFICE NOTE from referring provider Internal Referral in chart    Notes scanned into chart   OFFICE NOTE from other specialist N/A    DISCHARGE SUMMARY from hospital N/A    DISCHARGE REPORT from ER N/A    OPERATIVE REPORT N/A    EMG REPORT N/A    Injection Internal Injection 11/06/2024 08/29/2024   Physical therapy N/A    IMAGING  (FOR ALL VISITS)     MRI (HEAD, NECK, SPINE) received MRI at [facility] on 8/08/24   XRAY (SPINE) *NEUROSURGERY* received XR cervical 08/07/2024   CT (HEAD, NECK, SPINE) N/A

## 2024-10-31 NOTE — ED TRIAGE NOTES
Pt presents with concerns of left sided neck pain and headache. Pt recenetly seen primary, had imaging done and steriod injection completed around the end of August. Pt reports having an appointment scheduled to have injection completed again next week as well as a referral to see a neurosurgeon. Increased pain over the last week.

## 2024-10-31 NOTE — ED PROVIDER NOTES
History     Chief Complaint   Patient presents with    Neck Pain    Shoulder Pain     HPI  Debra Cannon is a 59 year old female who presents to urgent care for left-sided neck pain.  Patient had MRI performed this last August at [facility] which showed impingement of nerve root of C5/C6 and C6/C7.  Patient had steroid injection performed on right side which helped.  She states that over the past couple months she has had progressively worsening pain on her left side of her neck without any new mechanism of injury.  She has a injection scheduled on 11/6/2024 and has been taking Zanaflex and Tylenol 3 which is not helping for her pain anymore.  She states that she is not able to sleep because of the pain.  She states that she does have some tingling in her fingers of left hand but otherwise denies any peripheral weakness, saddle anesthesia, urinary retention, incontinence of bowel or bladder, or any other associated symptoms.  Patient does have a referral to neurology as well which was just placed yesterday.    Allergies:  Allergies   Allergen Reactions    Amoxicillin-Pot Clavulanate Nausea and Vomiting    Cats Swelling    Other Environmental Allergy      hay       Problem List:    Patient Active Problem List    Diagnosis Date Noted    Facial cellulitis 08/29/2016     Priority: Medium        Past Medical History:    Past Medical History:   Diagnosis Date    Anxiety     Depressive disorder     GERD (gastroesophageal reflux disease)        Past Surgical History:    Past Surgical History:   Procedure Laterality Date    BREAST SURGERY      augmentation    CHOLECYSTECTOMY      ENT SURGERY      T&A    GYN SURGERY      endo ablation    ORTHOPEDIC SURGERY      rotator cuff repair    SOFT TISSUE SURGERY      basal cell ca       Family History:    Family History   Problem Relation Age of Onset    Hypertension Mother     Hypertension Father     Diabetes Father     Hyperlipidemia Father        Social History:  Marital  "Status:   [2]  Social History     Tobacco Use    Smoking status: Every Day     Current packs/day: 1.00     Average packs/day: 1 pack/day for 35.0 years (35.0 ttl pk-yrs)     Types: Cigarettes    Smokeless tobacco: Never   Substance Use Topics    Alcohol use: Yes     Comment: social    Drug use: No        Medications:    acetaminophen-codeine (TYLENOL #3) 300-30 MG tablet  gabapentin (NEURONTIN) 100 MG capsule  HYDROcodone-acetaminophen (NORCO) 5-325 MG tablet  ibuprofen (ADVIL/MOTRIN) 200 MG tablet  ondansetron (ZOFRAN) 4 MG tablet  tiZANidine (ZANAFLEX) 4 MG tablet  albuterol (ALBUTEROL) 108 (90 BASE) MCG/ACT Inhaler  ALPRAZolam (XANAX) 0.25 MG tablet  cholestyramine (QUESTRAN) 4 g packet  citalopram (CELEXA) 20 MG tablet  mometasone (NASONEX) 50 MCG/ACT nasal spray  omeprazole 20 MG tablet  predniSONE (DELTASONE) 20 MG tablet          Review of Systems   Musculoskeletal:  Positive for neck pain.   All other systems reviewed and are negative.      Physical Exam   BP: 153/92  Pulse: 70  Temp: 98.2  F (36.8  C)  Resp: 16  Height: 162.6 cm (5' 4\")  Weight: 76.2 kg (168 lb)  SpO2: 96 %      Physical Exam  Vitals and nursing note reviewed.   Constitutional:       General: She is not in acute distress.     Appearance: Normal appearance. She is not ill-appearing or toxic-appearing.   Cardiovascular:      Rate and Rhythm: Regular rhythm.      Heart sounds: Normal heart sounds.   Pulmonary:      Breath sounds: Normal breath sounds.   Musculoskeletal:      Cervical back: Rigidity present. No crepitus. Pain with movement present. Decreased range of motion.   Neurological:      Mental Status: She is alert and oriented to person, place, and time.         ED Course        Procedures             Critical Care time:               No results found for this or any previous visit (from the past 24 hours).    Medications - No data to display    Assessments & Plan (with Medical Decision Making)   #1.  Neck pain left " side    Discussed exam findings with patient.  Patient is given Norco for severe pain until she has her injection.  She is also prescribed Zofran for nausea.  We reviewed appropriate dosing of Tylenol.  I recommend she not take the Tylenol 3 while taking the Norco.  We reviewed emergent symptoms that would require prompt return to emergency department.  Any additional concerns patient can return to urgent care or follow-up with primary care provider.  Patient verbalized understanding and agreement of plan.      I have reviewed the nursing notes.    I have reviewed the findings, diagnosis, plan and need for follow up with the patient.                Discharge Medication List as of 10/31/2024  9:40 AM        START taking these medications    Details   HYDROcodone-acetaminophen (NORCO) 5-325 MG tablet Take 1 tablet by mouth every 6 hours as needed for severe pain., Disp-24 tablet, R-0, E-Prescribe      ondansetron (ZOFRAN) 4 MG tablet Take 1 tablet (4 mg) by mouth every 8 hours as needed for nausea., Disp-18 tablet, R-0, E-Prescribe             Final diagnoses:   Neck pain on left side       10/31/2024   [facility]       Brandon Rockwell PA-C  10/31/24 [id number]

## 2024-11-04 ENCOUNTER — TELEPHONE (OUTPATIENT)
Dept: INTERVENTIONAL RADIOLOGY/VASCULAR | Facility: HOSPITAL | Age: 59
End: 2024-11-04

## 2024-11-04 NOTE — TELEPHONE ENCOUNTER
Left a message to remind patient of their thomas on 11/6. Also reminded patient to not take any antibiotics, steroids, or immunizations two weeks before and after this appt. And they need a .         0 = understands/communicates without difficulty

## 2024-11-06 ENCOUNTER — MEDICAL CORRESPONDENCE (OUTPATIENT)
Dept: INTERVENTIONAL RADIOLOGY/VASCULAR | Facility: HOSPITAL | Age: 59
End: 2024-11-06

## 2024-11-06 ENCOUNTER — HOSPITAL ENCOUNTER (OUTPATIENT)
Dept: INTERVENTIONAL RADIOLOGY/VASCULAR | Facility: HOSPITAL | Age: 59
Discharge: HOME | End: 2024-11-06
Attending: FAMILY MEDICINE | Admitting: RADIOLOGY
Payer: COMMERCIAL

## 2024-11-06 ENCOUNTER — HOSPITAL ENCOUNTER (OUTPATIENT)
Facility: HOSPITAL | Age: 59
Discharge: HOME | End: 2024-11-06
Attending: RADIOLOGY | Admitting: RADIOLOGY
Payer: COMMERCIAL

## 2024-11-06 DIAGNOSIS — M54.2 CERVICAL SPINE PAIN: ICD-10-CM

## 2024-11-06 DIAGNOSIS — M54.12 CERVICAL RADICULOPATHY: ICD-10-CM

## 2024-11-06 PROCEDURE — 250N000011 HC RX IP 250 OP 636: Performed by: RADIOLOGY

## 2024-11-06 PROCEDURE — 64479 NJX AA&/STRD TFRM EPI C/T 1: CPT | Mod: LT

## 2024-11-06 RX ORDER — IOPAMIDOL 612 MG/ML
15 INJECTION, SOLUTION INTRATHECAL ONCE
Status: COMPLETED | OUTPATIENT
Start: 2024-11-06 | End: 2024-11-06

## 2024-11-06 RX ORDER — DEXAMETHASONE SODIUM PHOSPHATE 10 MG/ML
10 INJECTION, SOLUTION INTRAMUSCULAR; INTRAVENOUS ONCE
Status: COMPLETED | OUTPATIENT
Start: 2024-11-06 | End: 2024-11-06

## 2024-11-06 RX ADMIN — IOPAMIDOL 1 ML: 612 INJECTION, SOLUTION INTRATHECAL at 08:49

## 2024-11-06 RX ADMIN — DEXAMETHASONE SODIUM PHOSPHATE 10 MG: 10 INJECTION, SOLUTION INTRAMUSCULAR; INTRAVENOUS at 08:49

## 2024-11-06 ASSESSMENT — ACTIVITIES OF DAILY LIVING (ADL): ADLS_ACUITY_SCORE: 0

## 2024-11-06 NOTE — DISCHARGE INSTRUCTIONS
Cell number on file:    Telephone Information:   Mobile [phone]     Is it ok to leave a message at this number(s)? Yes    Dr. Haile completed your procedure on 11/6/2024.    Current Pain Level (0-10 Scale): 6/10  Post Pain Level (0-10):  0/10    Radiology Discharge instructions for Steroid Injection    Activity Level:     Do not do any heavy activity or exercise for 24 hours.   Do not drive for 4 hours after your injection.  Diet:   Return to your normal diet.  Medications:   If you have stopped taking your Aspirin, Coumadin/Warfarin, Ibuprofen, or any   other blood thinner for this procedure you may resume in the morning unless   your primary care provider has given you other instructions.    Diabetics may see an increase in blood sugar after steroid injections. If you are concerned about your blood sugar, please contact your family doctor.    Site Care:  Remove the bandage and bathe or shower the morning after the procedure.      Please allow two weeks to experience improvement in your pain.  If you have any further issues, please contact your provider.    Call your Primary Care Provider if you have the following (if your primary care provider is not available please seek emergency care):   Nausea with vomiting   Severe headache   Drowsiness or confusion   Redness or drainage at the injection or puncture site   Temperature over 101 degrees F   Other concerns   Worsening back pain   Stiff neck

## 2024-11-07 ENCOUNTER — MEDICAL CORRESPONDENCE (OUTPATIENT)
Dept: INTERVENTIONAL RADIOLOGY/VASCULAR | Facility: HOSPITAL | Age: 59
End: 2024-11-07

## 2024-11-11 DIAGNOSIS — M54.2 CERVICAL PAIN: Primary | ICD-10-CM

## 2024-11-11 NOTE — PROGRESS NOTES
11/11 Called and spoke to patient, xrays are scheduled.     [facility]   Orthopedics, Podiatry, Sports Medicine, Ent ,Eye , Audiology, Adult Endocrine & Diabetes, Nutrition & Medication Therapy Management Specialties   [facility]

## 2024-11-13 ENCOUNTER — ANCILLARY PROCEDURE (OUTPATIENT)
Dept: MAMMOGRAPHY | Facility: OTHER | Age: 59
End: 2024-11-13
Attending: FAMILY MEDICINE
Payer: COMMERCIAL

## 2024-11-13 ENCOUNTER — TELEPHONE (OUTPATIENT)
Dept: MAMMOGRAPHY | Facility: OTHER | Age: 59
End: 2024-11-13

## 2024-11-13 ENCOUNTER — HOSPITAL ENCOUNTER (OUTPATIENT)
Dept: CT IMAGING | Facility: HOSPITAL | Age: 59
Discharge: HOME | End: 2024-11-13
Attending: FAMILY MEDICINE | Admitting: RADIOLOGY
Payer: COMMERCIAL

## 2024-11-13 DIAGNOSIS — Z87.891 PERSONAL HISTORY OF NICOTINE DEPENDENCE: ICD-10-CM

## 2024-11-13 DIAGNOSIS — Z12.31 VISIT FOR SCREENING MAMMOGRAM: ICD-10-CM

## 2024-11-13 PROCEDURE — 71271 CT THORAX LUNG CANCER SCR C-: CPT

## 2024-11-13 PROCEDURE — 77067 SCR MAMMO BI INCL CAD: CPT | Mod: TC | Performed by: RADIOLOGY

## 2024-11-13 PROCEDURE — 77063 BREAST TOMOSYNTHESIS BI: CPT | Mod: TC | Performed by: RADIOLOGY

## 2024-11-14 ENCOUNTER — ANCILLARY PROCEDURE (OUTPATIENT)
Dept: GENERAL RADIOLOGY | Facility: CLINIC | Age: 59
End: 2024-11-14
Attending: STUDENT IN AN ORGANIZED HEALTH CARE EDUCATION/TRAINING PROGRAM
Payer: COMMERCIAL

## 2024-11-14 ENCOUNTER — PRE VISIT (OUTPATIENT)
Dept: NEUROSURGERY | Facility: CLINIC | Age: 59
End: 2024-11-14

## 2024-11-14 ENCOUNTER — OFFICE VISIT (OUTPATIENT)
Dept: NEUROSURGERY | Facility: CLINIC | Age: 59
End: 2024-11-14
Attending: FAMILY MEDICINE
Payer: COMMERCIAL

## 2024-11-14 VITALS
WEIGHT: 168 LBS | HEART RATE: 86 BPM | DIASTOLIC BLOOD PRESSURE: 89 MMHG | BODY MASS INDEX: 28.68 KG/M2 | SYSTOLIC BLOOD PRESSURE: 144 MMHG | HEIGHT: 64 IN

## 2024-11-14 DIAGNOSIS — M54.2 CERVICAL PAIN: ICD-10-CM

## 2024-11-14 DIAGNOSIS — M54.2 CERVICAL PAIN: Primary | ICD-10-CM

## 2024-11-14 PROCEDURE — 72040 X-RAY EXAM NECK SPINE 2-3 VW: CPT | Mod: GC | Performed by: RADIOLOGY

## 2024-11-14 PROCEDURE — 72082 X-RAY EXAM ENTIRE SPI 2/3 VW: CPT | Performed by: PREVENTIVE MEDICINE

## 2024-11-14 PROCEDURE — 99204 OFFICE O/P NEW MOD 45 MIN: CPT | Performed by: STUDENT IN AN ORGANIZED HEALTH CARE EDUCATION/TRAINING PROGRAM

## 2024-11-14 NOTE — PROGRESS NOTES
HPI:  59-year-old female with neck pain.  This started after a fall in the shower in May or June 2024.  This worsened over the following month and she eventually was treated with facet medial branch blocks which appear to be at C3-4 and C4-5 on the right in late August 2024.  She noted significant improvement of at least 70% for about 3 weeks until reaggravation occurred.  She then developed more left-sided symptoms similar to the right side at this point as well.  She denies any significant radiation down into the arms.  When her neck pain gets sore the pain does radiate more into the shoulders but not down the arms in a radicular pattern.  She denies any weakness or numbness.  She did undergo an epidural steroid injection without any significant benefit.  She has not done any physical therapy recently.  She does note more pain with turning her head to the left now as well X extension.  Current Outpatient Medications   Medication Sig Dispense Refill    acetaminophen-codeine (TYLENOL #3) 300-30 MG tablet Take 1 tablet by mouth every 6 hours as needed for severe pain      albuterol (ALBUTEROL) 108 (90 BASE) MCG/ACT Inhaler Inhale 2 puffs into the lungs 4 times daily And PRN 1 Inhaler 0    ALPRAZolam (XANAX) 0.25 MG tablet       cholestyramine (QUESTRAN) 4 g packet       citalopram (CELEXA) 20 MG tablet Take 20 mg by mouth daily      gabapentin (NEURONTIN) 100 MG capsule       ibuprofen (ADVIL/MOTRIN) 200 MG tablet Take 3 tablets (600 mg) by mouth every 6 hours as needed for pain (Patient taking differently: Take 800 mg by mouth every 6 hours as needed for pain.) 60 tablet 0    mometasone (NASONEX) 50 MCG/ACT nasal spray       omeprazole 20 MG tablet Take 1 tablet (20 mg) by mouth daily      predniSONE (DELTASONE) 20 MG tablet Take two tablets (= 40mg) each day for 5 (five) days 10 tablet 0    tiZANidine (ZANAFLEX) 4 MG tablet Take 4 mg by mouth.       No current facility-administered medications for this visit.     "  Physical Exam:  Vital signs:      BP: (!) 144/89 Pulse: 86           Height: 162.6 cm (5' 4\") Weight: 76.2 kg (168 lb)  Estimated body mass index is 28.84 kg/m  as calculated from the following:    Height as of this encounter: 1.626 m (5' 4\").    Weight as of this encounter: 76.2 kg (168 lb).  She has full strength in her bilateral upper extremities.  Sensation is intact to light touch throughout.  No Brian sign present.  Biceps reflexes are 1+ bilaterally.  Results Reviewed:  I personally viewed the images of an MRI of the cervical spine.  This shows multilevel spondylosis most significant disc height loss at C5-6 and C6-7.  This does likely relate to flexion-extension films which show increased movement in flexion at C3-4 and C4-5 but not pathologic.  Overall loss of cervical lordosis on scoliosis films.  No significant central canal stenosis on MRI.  There is multilevel foraminal stenosis.  Assessment:  59-year-old female with axial neck pain like related to facet arthropathy.  She has had significant improvement of at least 70% for about 3 weeks after the right sided C3-4 and C4-5 medial branch blocks but did not have radiofrequency ablation after this.  She had no left-sided injections.  Based on her symptoms and improvement with the prior medial branch blocks I do believe this is most likely related to facet arthropathy and likely related to the increased movement at C3-4 and C4-5 to make up for the loss of movement at C5-6 and C6-7.  Plan:  At this time I would recommend following up for radiofrequency ablations on the right side at C3-4 and C4-5 and then going forward with medial branch blocks at C3-4 and C4-5 on the left side.  This would then be followed with further radiofrequency ablations on the left side if that improves her left-sided symptoms.  I would also have her start physical therapy as she does have loss of cervical lordosis and the increase sagittal balance may affect her myofascial pain " posteriorly in the neck as well.  This time I would not recommend any surgery as she does not have any symptoms consistent with cervical radiculopathy and axial neck pain is generally not treated well with surgery.  She can follow-up with neurosurgery as needed.    Massimo Abreu MD

## 2024-11-14 NOTE — LETTER
11/14/2024      Debra Cannon  [address]  [place] [address]      Dear Colleague,    Thank you for referring your patient, Debra Cannon, to the [facility] [place]. Please see a copy of my visit note below.    HPI:  59-year-old female with neck pain.  This started after a fall in the shower in May or June 2024.  This worsened over the following month and she eventually was treated with facet medial branch blocks which appear to be at C3-4 and C4-5 on the right in late August 2024.  She noted significant improvement of at least 70% for about 3 weeks until reaggravation occurred.  She then developed more left-sided symptoms similar to the right side at this point as well.  She denies any significant radiation down into the arms.  When her neck pain gets sore the pain does radiate more into the shoulders but not down the arms in a radicular pattern.  She denies any weakness or numbness.  She did undergo an epidural steroid injection without any significant benefit.  She has not done any physical therapy recently.  She does note more pain with turning her head to the left now as well X extension.  Current Outpatient Medications   Medication Sig Dispense Refill     acetaminophen-codeine (TYLENOL #3) 300-30 MG tablet Take 1 tablet by mouth every 6 hours as needed for severe pain       albuterol (ALBUTEROL) 108 (90 BASE) MCG/ACT Inhaler Inhale 2 puffs into the lungs 4 times daily And PRN 1 Inhaler 0     ALPRAZolam (XANAX) 0.25 MG tablet        cholestyramine (QUESTRAN) 4 g packet        citalopram (CELEXA) 20 MG tablet Take 20 mg by mouth daily       gabapentin (NEURONTIN) 100 MG capsule        ibuprofen (ADVIL/MOTRIN) 200 MG tablet Take 3 tablets (600 mg) by mouth every 6 hours as needed for pain (Patient taking differently: Take 800 mg by mouth every 6 hours as needed for pain.) 60 tablet 0     mometasone (NASONEX) 50 MCG/ACT nasal spray        omeprazole 20 MG tablet Take 1  "tablet (20 mg) by mouth daily       predniSONE (DELTASONE) 20 MG tablet Take two tablets (= 40mg) each day for 5 (five) days 10 tablet 0     tiZANidine (ZANAFLEX) 4 MG tablet Take 4 mg by mouth.       No current facility-administered medications for this visit.      Physical Exam:  Vital signs:      BP: (!) 144/89 Pulse: 86           Height: 162.6 cm (5' 4\") Weight: 76.2 kg (168 lb)  Estimated body mass index is 28.84 kg/m  as calculated from the following:    Height as of this encounter: 1.626 m (5' 4\").    Weight as of this encounter: 76.2 kg (168 lb).  She has full strength in her bilateral upper extremities.  Sensation is intact to light touch throughout.  No Brian sign present.  Biceps reflexes are 1+ bilaterally.  Results Reviewed:  I personally viewed the images of an MRI of the cervical spine.  This shows multilevel spondylosis most significant disc height loss at C5-6 and C6-7.  This does likely relate to flexion-extension films which show increased movement in flexion at C3-4 and C4-5 but not pathologic.  Overall loss of cervical lordosis on scoliosis films.  No significant central canal stenosis on MRI.  There is multilevel foraminal stenosis.  Assessment:  59-year-old female with axial neck pain like related to facet arthropathy.  She has had significant improvement of at least 70% for about 3 weeks after the right sided C3-4 and C4-5 medial branch blocks but did not have radiofrequency ablation after this.  She had no left-sided injections.  Based on her symptoms and improvement with the prior medial branch blocks I do believe this is most likely related to facet arthropathy and likely related to the increased movement at C3-4 and C4-5 to make up for the loss of movement at C5-6 and C6-7.  Plan:  At this time I would recommend following up for radiofrequency ablations on the right side at C3-4 and C4-5 and then going forward with medial branch blocks at C3-4 and C4-5 on the left side.  This would " then be followed with further radiofrequency ablations on the left side if that improves her left-sided symptoms.  I would also have her start physical therapy as she does have loss of cervical lordosis and the increase sagittal balance may affect her myofascial pain posteriorly in the neck as well.  This time I would not recommend any surgery as she does not have any symptoms consistent with cervical radiculopathy and axial neck pain is generally not treated well with surgery.  She can follow-up with neurosurgery as needed.    Massimo Abreu MD      Again, thank you for allowing me to participate in the care of your patient.        Sincerely,        Massimo Abreu MD

## 2024-11-16 ENCOUNTER — HOSPITAL ENCOUNTER (EMERGENCY)
Facility: HOSPITAL | Age: 59
Discharge: HOME | End: 2024-11-16
Attending: STUDENT IN AN ORGANIZED HEALTH CARE EDUCATION/TRAINING PROGRAM
Payer: COMMERCIAL

## 2024-11-16 ENCOUNTER — APPOINTMENT (OUTPATIENT)
Dept: CT IMAGING | Facility: HOSPITAL | Age: 59
End: 2024-11-16
Attending: STUDENT IN AN ORGANIZED HEALTH CARE EDUCATION/TRAINING PROGRAM
Payer: COMMERCIAL

## 2024-11-16 VITALS
TEMPERATURE: 97.9 F | HEART RATE: 67 BPM | SYSTOLIC BLOOD PRESSURE: 136 MMHG | RESPIRATION RATE: 18 BRPM | OXYGEN SATURATION: 93 % | DIASTOLIC BLOOD PRESSURE: 80 MMHG

## 2024-11-16 DIAGNOSIS — R10.9 FLANK PAIN: ICD-10-CM

## 2024-11-16 LAB
ALBUMIN SERPL BCG-MCNC: 4.4 G/DL (ref 3.5–5.2)
ALBUMIN UR-MCNC: NEGATIVE MG/DL
ALP SERPL-CCNC: 53 U/L (ref 40–150)
ALT SERPL W P-5'-P-CCNC: 38 U/L (ref 0–50)
ANION GAP SERPL CALCULATED.3IONS-SCNC: 11 MMOL/L (ref 7–15)
APPEARANCE UR: CLEAR
AST SERPL W P-5'-P-CCNC: 28 U/L (ref 0–45)
BILIRUB DIRECT SERPL-MCNC: <0.2 MG/DL (ref 0–0.3)
BILIRUB SERPL-MCNC: 0.2 MG/DL
BILIRUB UR QL STRIP: NEGATIVE
BUN SERPL-MCNC: 15.6 MG/DL (ref 6–20)
CALCIUM SERPL-MCNC: 8.9 MG/DL (ref 8.8–10.4)
CHLORIDE SERPL-SCNC: 99 MMOL/L (ref 98–107)
COLOR UR AUTO: ABNORMAL
CREAT SERPL-MCNC: 0.94 MG/DL (ref 0.51–0.95)
EGFRCR SERPLBLD CKD-EPI 2021: 70 ML/MIN/1.73M2
GLUCOSE SERPL-MCNC: 129 MG/DL (ref 70–99)
GLUCOSE UR STRIP-MCNC: NEGATIVE MG/DL
HCO3 SERPL-SCNC: 24 MMOL/L (ref 22–29)
HGB UR QL STRIP: ABNORMAL
HOLD SPECIMEN: NORMAL
KETONES UR STRIP-MCNC: NEGATIVE MG/DL
LEUKOCYTE ESTERASE UR QL STRIP: ABNORMAL
LIPASE SERPL-CCNC: 18 U/L (ref 13–60)
MUCOUS THREADS #/AREA URNS LPF: PRESENT /LPF
NITRATE UR QL: NEGATIVE
PH UR STRIP: 6 [PH] (ref 4.7–8)
POTASSIUM SERPL-SCNC: 3.8 MMOL/L (ref 3.4–5.3)
PROT SERPL-MCNC: 6.8 G/DL (ref 6.4–8.3)
RBC URINE: 8 /HPF
SODIUM SERPL-SCNC: 134 MMOL/L (ref 135–145)
SP GR UR STRIP: 1.02 (ref 1–1.03)
SQUAMOUS EPITHELIAL: 0 /HPF
UROBILINOGEN UR STRIP-MCNC: NORMAL MG/DL
WBC URINE: 1 /HPF

## 2024-11-16 PROCEDURE — 250N000011 HC RX IP 250 OP 636: Performed by: STUDENT IN AN ORGANIZED HEALTH CARE EDUCATION/TRAINING PROGRAM

## 2024-11-16 PROCEDURE — 84155 ASSAY OF PROTEIN SERUM: CPT | Performed by: STUDENT IN AN ORGANIZED HEALTH CARE EDUCATION/TRAINING PROGRAM

## 2024-11-16 PROCEDURE — 96361 HYDRATE IV INFUSION ADD-ON: CPT | Performed by: STUDENT IN AN ORGANIZED HEALTH CARE EDUCATION/TRAINING PROGRAM

## 2024-11-16 PROCEDURE — 83690 ASSAY OF LIPASE: CPT | Performed by: STUDENT IN AN ORGANIZED HEALTH CARE EDUCATION/TRAINING PROGRAM

## 2024-11-16 PROCEDURE — 99285 EMERGENCY DEPT VISIT HI MDM: CPT | Mod: 25 | Performed by: STUDENT IN AN ORGANIZED HEALTH CARE EDUCATION/TRAINING PROGRAM

## 2024-11-16 PROCEDURE — 80048 BASIC METABOLIC PNL TOTAL CA: CPT | Performed by: STUDENT IN AN ORGANIZED HEALTH CARE EDUCATION/TRAINING PROGRAM

## 2024-11-16 PROCEDURE — 96374 THER/PROPH/DIAG INJ IV PUSH: CPT | Performed by: STUDENT IN AN ORGANIZED HEALTH CARE EDUCATION/TRAINING PROGRAM

## 2024-11-16 PROCEDURE — 258N000003 HC RX IP 258 OP 636: Performed by: STUDENT IN AN ORGANIZED HEALTH CARE EDUCATION/TRAINING PROGRAM

## 2024-11-16 PROCEDURE — 96375 TX/PRO/DX INJ NEW DRUG ADDON: CPT | Performed by: STUDENT IN AN ORGANIZED HEALTH CARE EDUCATION/TRAINING PROGRAM

## 2024-11-16 PROCEDURE — 80053 COMPREHEN METABOLIC PANEL: CPT | Performed by: STUDENT IN AN ORGANIZED HEALTH CARE EDUCATION/TRAINING PROGRAM

## 2024-11-16 PROCEDURE — 36415 COLL VENOUS BLD VENIPUNCTURE: CPT | Performed by: STUDENT IN AN ORGANIZED HEALTH CARE EDUCATION/TRAINING PROGRAM

## 2024-11-16 PROCEDURE — 74176 CT ABD & PELVIS W/O CONTRAST: CPT

## 2024-11-16 PROCEDURE — 99284 EMERGENCY DEPT VISIT MOD MDM: CPT | Performed by: STUDENT IN AN ORGANIZED HEALTH CARE EDUCATION/TRAINING PROGRAM

## 2024-11-16 PROCEDURE — 81001 URINALYSIS AUTO W/SCOPE: CPT | Performed by: STUDENT IN AN ORGANIZED HEALTH CARE EDUCATION/TRAINING PROGRAM

## 2024-11-16 PROCEDURE — 250N000013 HC RX MED GY IP 250 OP 250 PS 637: Performed by: STUDENT IN AN ORGANIZED HEALTH CARE EDUCATION/TRAINING PROGRAM

## 2024-11-16 PROCEDURE — 82247 BILIRUBIN TOTAL: CPT | Performed by: STUDENT IN AN ORGANIZED HEALTH CARE EDUCATION/TRAINING PROGRAM

## 2024-11-16 RX ORDER — KETOROLAC TROMETHAMINE 15 MG/ML
10 INJECTION, SOLUTION INTRAMUSCULAR; INTRAVENOUS ONCE
Status: COMPLETED | OUTPATIENT
Start: 2024-11-16 | End: 2024-11-16

## 2024-11-16 RX ORDER — ACETAMINOPHEN 325 MG/1
975 TABLET ORAL ONCE
Status: COMPLETED | OUTPATIENT
Start: 2024-11-16 | End: 2024-11-16

## 2024-11-16 RX ORDER — OXYCODONE HYDROCHLORIDE 5 MG/1
5 TABLET ORAL EVERY 4 HOURS PRN
Status: DISCONTINUED | OUTPATIENT
Start: 2024-11-16 | End: 2024-11-16 | Stop reason: HOSPADM

## 2024-11-16 RX ORDER — ONDANSETRON 2 MG/ML
4 INJECTION INTRAMUSCULAR; INTRAVENOUS EVERY 30 MIN PRN
Status: DISCONTINUED | OUTPATIENT
Start: 2024-11-16 | End: 2024-11-16 | Stop reason: HOSPADM

## 2024-11-16 RX ADMIN — SODIUM CHLORIDE 1000 ML: 9 INJECTION, SOLUTION INTRAVENOUS at 17:39

## 2024-11-16 RX ADMIN — KETOROLAC TROMETHAMINE 10 MG: 15 INJECTION, SOLUTION INTRAMUSCULAR; INTRAVENOUS at 18:08

## 2024-11-16 RX ADMIN — KETOROLAC TROMETHAMINE 10 MG: 15 INJECTION, SOLUTION INTRAMUSCULAR; INTRAVENOUS at 17:40

## 2024-11-16 RX ADMIN — ONDANSETRON 4 MG: 2 INJECTION INTRAMUSCULAR; INTRAVENOUS at 17:39

## 2024-11-16 RX ADMIN — OXYCODONE HYDROCHLORIDE 5 MG: 5 TABLET ORAL at 18:08

## 2024-11-16 RX ADMIN — ACETAMINOPHEN 975 MG: 325 TABLET ORAL at 17:39

## 2024-11-16 ASSESSMENT — ACTIVITIES OF DAILY LIVING (ADL)
ADLS_ACUITY_SCORE: 0

## 2024-11-16 ASSESSMENT — COLUMBIA-SUICIDE SEVERITY RATING SCALE - C-SSRS
1. IN THE PAST MONTH, HAVE YOU WISHED YOU WERE DEAD OR WISHED YOU COULD GO TO SLEEP AND NOT WAKE UP?: NO
2. HAVE YOU ACTUALLY HAD ANY THOUGHTS OF KILLING YOURSELF IN THE PAST MONTH?: NO
6. HAVE YOU EVER DONE ANYTHING, STARTED TO DO ANYTHING, OR PREPARED TO DO ANYTHING TO END YOUR LIFE?: NO

## 2024-11-16 NOTE — ED PROVIDER NOTES
[facility]  ED Provider Note    Chief Complaint   Patient presents with    Flank Pain     History:  Debra Cannon is a 59 year old female with no relevant past medical history presents to the emergency department today complaining of acute onset left-sided flank pain that started at 11:00 today and eventually radiated into the left groin.  There is nausea.  She has not felt pain like this she has no history of kidney stones.  She cannot find comfortable position.  She feels like she needs to urinate but seems to move is much urine as she feels like is present.  No other complaints at this time.  No fevers.  No dysuria    Review of Systems   Performed; see HPI for pertinent positives and negatives.     Medical history, surgical history, and social history was reviewed.  Nursing documentation, triage note, and vitals were reviewed.    Vitals:  BP: 144/93  Pulse: 78  Temp: 97.2  F (36.2  C)  Resp: 16  SpO2: 93 %    Physical Exam:  Constitutional: Alert and conversant. NAD   HENT: NCAT   Eyes: Normal pupils   Neck: supple   CV: No pallor  Pulmonary/Chest: Non-labored respirations  Abdominal: non-distended with minimal left CVA tenderness  MSK: CONTRERAS.   Neuro: Alert and appropriate   Skin: Warm and dry. No diaphoresis. No rashes on exposed skin    Psych: Appropriate mood and affect       MDM:      ED Course as of 11/17/24 0720   Sat Nov 16, 2024 1836 Differential includes but is not limited to nephrolithiasis/ureterolithiasis, pyelonephritis, rib fracture/muscle strain, pulmonary contusion, PNA, pleurisy, cholecystitis/choledocholithiasis/ascending cholangitis, diverticulitis, aortic dissection   1837 Presentation most consistent with ston ureterolithiasis.  There is blood in the urine.  Pending results of CT.  e   1837 Bilirubin Direct: <0.20   1837 Bilirubin Total: 0.2  No evidence of biliary stasis   1837 AST: 28   no evidence of hepatic injury   1837 Lipase: 18   or not pancreatitis   1838 I do  not see obvious stone on the CT scan, but the overall clinical presentation and the presence of blood in the urine and this 59-year-old is certainly most suspicious for stone already passed from the ureter.  I did examine the skin and I do not see a rash to suggest shingles.  I also think that the angle of the radiation of pain is that wraps around his probably too steep to suggest a nerve distribution for radiculopathic or shingles presentation.  MSK resistance rebecca a possibility.  Pending official radiology read   1850 Signout given with plan to follow-up on imaging and reassess patient's symptoms.  leading suspicious for passed stone   2129 CT without obvious stone.  Suspect patient may have passed it as       Procedures:  Procedures        Impression:  Final diagnoses:   Flank pain            Telly Hickey MD  11/17/24 0827

## 2024-11-16 NOTE — ED TRIAGE NOTES
Patient presents with c/o left flank pain radiating into left groin starting at about 1100 today also reports nausea, denies emesis. Patient reports that she is unable to urinate, last void 3 hours ago. Patient reports osme frequency prior to flank pain. Last BM yesterday- normal per patient. Denies any fevers or chills. Denies any HX of stones. Took Tylenol #3 a few hours ago,

## 2024-11-19 ENCOUNTER — TELEPHONE (OUTPATIENT)
Dept: INTERVENTIONAL RADIOLOGY/VASCULAR | Facility: HOSPITAL | Age: 59
End: 2024-11-19

## 2024-11-19 NOTE — TELEPHONE ENCOUNTER
INJECTION POST CALL    Procedure: Epidural TF LT C5-6  Radiologist(s): Dr. Donavan Haile  Date of Procedure:  11/6/24    Relief of pain from this injection    A = 90%  A- = 85%  B = 80%  B- =75%  C = 70%  C- = 65%  D = 60%  D- = 50%  F = less than 50%    Do you feel this injection was beneficial? No, patient said maybe 10% of relief.     If yes, how long did it last? Patient did not feel any long term relief with this injection    Instruct patient to follow up with their provider for any further care they may need. (as Dr. Haile will no longer be making recommendations nor addended the exam with recommmendations)    Svetlana Basurto      Xolair Counseling:  Patient informed of potential adverse effects including but not limited to fever, muscle aches, rash and allergic reactions.  The patient verbalized understanding of the proper use and possible adverse effects of Xolair.  All of the patient's questions and concerns were addressed.

## 2024-11-26 ENCOUNTER — HOSPITAL ENCOUNTER (OUTPATIENT)
Dept: INTERVENTIONAL RADIOLOGY/VASCULAR | Facility: HOSPITAL | Age: 59
Discharge: HOME | End: 2024-11-26
Attending: RADIOLOGY
Payer: COMMERCIAL

## 2024-11-26 ENCOUNTER — HOSPITAL ENCOUNTER (OUTPATIENT)
Facility: HOSPITAL | Age: 59
Discharge: HOME | End: 2024-11-26
Attending: RADIOLOGY | Admitting: RADIOLOGY
Payer: COMMERCIAL

## 2024-11-26 ENCOUNTER — TELEPHONE (OUTPATIENT)
Dept: INTERVENTIONAL RADIOLOGY/VASCULAR | Facility: HOSPITAL | Age: 59
End: 2024-11-26

## 2024-11-26 DIAGNOSIS — M47.812 ARTHROPATHY OF CERVICAL FACET JOINT: ICD-10-CM

## 2024-11-26 PROCEDURE — 64490 INJ PARAVERT F JNT C/T 1 LEV: CPT | Mod: LT

## 2024-11-26 PROCEDURE — 250N000009 HC RX 250: Performed by: RADIOLOGY

## 2024-11-26 RX ORDER — LIDOCAINE HYDROCHLORIDE 10 MG/ML
10 INJECTION, SOLUTION EPIDURAL; INFILTRATION; INTRACAUDAL; PERINEURAL ONCE
Status: COMPLETED | OUTPATIENT
Start: 2024-11-26 | End: 2024-11-26

## 2024-11-26 RX ORDER — ROPIVACAINE HYDROCHLORIDE 10 MG/ML
20 INJECTION EPIDURAL; INFILTRATION; PERINEURAL ONCE
Status: COMPLETED | OUTPATIENT
Start: 2024-11-26 | End: 2024-11-26

## 2024-11-26 RX ADMIN — ROPIVACAINE HYDROCHLORIDE 2.25 ML: 10 INJECTION EPIDURAL; INFILTRATION; PERINEURAL at 10:10

## 2024-11-26 RX ADMIN — LIDOCAINE HYDROCHLORIDE 6 ML: 10 INJECTION, SOLUTION EPIDURAL; INFILTRATION; INTRACAUDAL; PERINEURAL at 10:10

## 2024-11-26 ASSESSMENT — ACTIVITIES OF DAILY LIVING (ADL)
ADLS_ACUITY_SCORE: 41

## 2024-11-26 NOTE — PROVIDER NOTIFICATION
Spoke with pt about cervical rhizotomy. Pt agreed to schedule on 12/06/2024. Pt denies being on blood thinners. Prefers any med prescribed to be sent to [facility]. Pt will call back with H&P date. Pt states she is not diabetic and will have a  day of procedure. Pt has bilateral breast implants. Dr Haile aware and stated to continue to schedule.

## 2024-11-26 NOTE — PROGRESS NOTES
Medial Branch Blocks Workup Discharge Instructions    To obtain authorization for a Rhizotomy (Radiofrequency Ablation), we are required to document the percent of relief of the Medial Branch Block injection after one hour.  Therefore, please call and report this to us an hour after your procedure.  If no one is available to answer, it will go to voicemail:  Please leave your name, phone number, and the relief you have obtained from your injection.        Report the percentage of pain relief you received from your medial branch block.   Such as 100%, 90%, 80%, 70%, 60%, 50% etc.   Please do not leave a range of relief; we require a specific percentage.   Without this information, your injection pain relief will not be documented as required for your insurance company. Calling is a crucial step of the Medial Branch Block injection and Rhizotomy workup.      Please call [phone] at 11:15 AM    Thank you,  The Interventional Care Team  11

## 2024-11-27 DIAGNOSIS — M47.812 ARTHROPATHY OF CERVICAL FACET JOINT: Primary | ICD-10-CM

## 2024-11-30 ENCOUNTER — HEALTH MAINTENANCE LETTER (OUTPATIENT)
Age: 59
End: 2024-11-30

## 2024-12-02 ENCOUNTER — TELEPHONE (OUTPATIENT)
Dept: INTERVENTIONAL RADIOLOGY/VASCULAR | Facility: HOSPITAL | Age: 59
End: 2024-12-02

## 2024-12-04 ENCOUNTER — TRANSFERRED RECORDS (OUTPATIENT)
Dept: HEALTH INFORMATION MANAGEMENT | Facility: CLINIC | Age: 59
End: 2024-12-04
Payer: COMMERCIAL

## 2024-12-05 ENCOUNTER — HOSPITAL ENCOUNTER (OUTPATIENT)
Facility: HOSPITAL | Age: 59
Discharge: HOME | End: 2024-12-05
Attending: RADIOLOGY | Admitting: RADIOLOGY
Payer: COMMERCIAL

## 2024-12-05 RX ORDER — METHYLPREDNISOLONE ACETATE 80 MG/ML
80 INJECTION, SUSPENSION INTRA-ARTICULAR; INTRALESIONAL; INTRAMUSCULAR; SOFT TISSUE ONCE
Status: CANCELLED | OUTPATIENT
Start: 2024-12-05 | End: 2024-12-05

## 2024-12-05 ASSESSMENT — ACTIVITIES OF DAILY LIVING (ADL)
ADLS_ACUITY_SCORE: 41
ADLS_ACUITY_SCORE: 41

## 2024-12-06 ENCOUNTER — HOSPITAL ENCOUNTER (OUTPATIENT)
Facility: HOSPITAL | Age: 59
Discharge: HOME | End: 2024-12-06
Attending: RADIOLOGY | Admitting: RADIOLOGY
Payer: COMMERCIAL

## 2024-12-06 ENCOUNTER — HOSPITAL ENCOUNTER (OUTPATIENT)
Dept: INTERVENTIONAL RADIOLOGY/VASCULAR | Facility: HOSPITAL | Age: 59
Discharge: HOME | End: 2024-12-06
Attending: RADIOLOGY
Payer: COMMERCIAL

## 2024-12-06 VITALS
TEMPERATURE: 97.3 F | WEIGHT: 164.6 LBS | HEART RATE: 60 BPM | RESPIRATION RATE: 16 BRPM | HEIGHT: 62 IN | SYSTOLIC BLOOD PRESSURE: 100 MMHG | BODY MASS INDEX: 30.29 KG/M2 | OXYGEN SATURATION: 92 % | DIASTOLIC BLOOD PRESSURE: 49 MMHG

## 2024-12-06 DIAGNOSIS — M47.812 CERVICAL SPONDYLOSIS WITHOUT MYELOPATHY: Primary | ICD-10-CM

## 2024-12-06 DIAGNOSIS — M47.812 ARTHROPATHY OF CERVICAL FACET JOINT: ICD-10-CM

## 2024-12-06 DIAGNOSIS — M47.812 ARTHROPATHY OF CERVICAL FACET JOINT: Primary | ICD-10-CM

## 2024-12-06 PROCEDURE — 64633 DESTROY CERV/THOR FACET JNT: CPT

## 2024-12-06 PROCEDURE — 250N000011 HC RX IP 250 OP 636: Performed by: RADIOLOGY

## 2024-12-06 PROCEDURE — 272N000228 XR CERVICAL RADIOFREQUENCY ABLATION UNILATERAL

## 2024-12-06 PROCEDURE — 250N000011 HC RX IP 250 OP 636: Performed by: NURSE ANESTHETIST, CERTIFIED REGISTERED

## 2024-12-06 PROCEDURE — 250N000009 HC RX 250: Performed by: RADIOLOGY

## 2024-12-06 PROCEDURE — 96372 THER/PROPH/DIAG INJ SC/IM: CPT | Performed by: RADIOLOGY

## 2024-12-06 PROCEDURE — 250N000013 HC RX MED GY IP 250 OP 250 PS 637: Performed by: RADIOLOGY

## 2024-12-06 PROCEDURE — 258N000003 HC RX IP 258 OP 636: Performed by: NURSE ANESTHETIST, CERTIFIED REGISTERED

## 2024-12-06 RX ORDER — FENTANYL CITRATE 50 UG/ML
50 INJECTION, SOLUTION INTRAMUSCULAR; INTRAVENOUS
Status: DISCONTINUED | OUTPATIENT
Start: 2024-12-06 | End: 2024-12-06 | Stop reason: HOSPADM

## 2024-12-06 RX ORDER — OXYCODONE AND ACETAMINOPHEN 5; 325 MG/1; MG/1
1 TABLET ORAL ONCE
Status: COMPLETED | OUTPATIENT
Start: 2024-12-06 | End: 2024-12-06

## 2024-12-06 RX ORDER — NALOXONE HYDROCHLORIDE 0.4 MG/ML
0.1 INJECTION, SOLUTION INTRAMUSCULAR; INTRAVENOUS; SUBCUTANEOUS
Status: DISCONTINUED | OUTPATIENT
Start: 2024-12-06 | End: 2024-12-06 | Stop reason: HOSPADM

## 2024-12-06 RX ORDER — LIDOCAINE 40 MG/G
CREAM TOPICAL
Status: DISCONTINUED | OUTPATIENT
Start: 2024-12-06 | End: 2024-12-06 | Stop reason: HOSPADM

## 2024-12-06 RX ORDER — SODIUM CHLORIDE, SODIUM LACTATE, POTASSIUM CHLORIDE, CALCIUM CHLORIDE 600; 310; 30; 20 MG/100ML; MG/100ML; MG/100ML; MG/100ML
1000 INJECTION, SOLUTION INTRAVENOUS CONTINUOUS
Status: DISCONTINUED | OUTPATIENT
Start: 2024-12-06 | End: 2024-12-06 | Stop reason: HOSPADM

## 2024-12-06 RX ORDER — SODIUM CHLORIDE 9 MG/ML
INJECTION, SOLUTION INTRAVENOUS CONTINUOUS
Status: DISCONTINUED | OUTPATIENT
Start: 2024-12-06 | End: 2024-12-06 | Stop reason: HOSPADM

## 2024-12-06 RX ORDER — OXYCODONE AND ACETAMINOPHEN 5; 325 MG/1; MG/1
1 TABLET ORAL EVERY 6 HOURS PRN
Qty: 12 TABLET | Refills: 0 | Status: SHIPPED | OUTPATIENT
Start: 2024-12-06 | End: 2024-12-09

## 2024-12-06 RX ORDER — SODIUM CHLORIDE, SODIUM LACTATE, POTASSIUM CHLORIDE, CALCIUM CHLORIDE 600; 310; 30; 20 MG/100ML; MG/100ML; MG/100ML; MG/100ML
INJECTION, SOLUTION INTRAVENOUS CONTINUOUS
Status: DISCONTINUED | OUTPATIENT
Start: 2024-12-06 | End: 2024-12-06 | Stop reason: HOSPADM

## 2024-12-06 RX ORDER — METHYLPREDNISOLONE ACETATE 80 MG/ML
80 INJECTION, SUSPENSION INTRA-ARTICULAR; INTRALESIONAL; INTRAMUSCULAR; SOFT TISSUE ONCE
Status: COMPLETED | OUTPATIENT
Start: 2024-12-06 | End: 2024-12-06

## 2024-12-06 RX ORDER — DEXAMETHASONE SODIUM PHOSPHATE 10 MG/ML
4 INJECTION, SOLUTION INTRAMUSCULAR; INTRAVENOUS
Status: DISCONTINUED | OUTPATIENT
Start: 2024-12-06 | End: 2024-12-06 | Stop reason: HOSPADM

## 2024-12-06 RX ORDER — LIDOCAINE HYDROCHLORIDE 10 MG/ML
INJECTION, SOLUTION EPIDURAL; INFILTRATION; INTRACAUDAL; PERINEURAL
Status: COMPLETED
Start: 2024-12-06 | End: 2024-12-06

## 2024-12-06 RX ORDER — ONDANSETRON 4 MG/1
4 TABLET, ORALLY DISINTEGRATING ORAL EVERY 30 MIN PRN
Status: DISCONTINUED | OUTPATIENT
Start: 2024-12-06 | End: 2024-12-06 | Stop reason: HOSPADM

## 2024-12-06 RX ORDER — LIDOCAINE HYDROCHLORIDE 10 MG/ML
30 INJECTION, SOLUTION EPIDURAL; INFILTRATION; INTRACAUDAL; PERINEURAL ONCE
Status: DISCONTINUED | OUTPATIENT
Start: 2024-12-06 | End: 2024-12-06 | Stop reason: HOSPADM

## 2024-12-06 RX ORDER — ONDANSETRON 2 MG/ML
4 INJECTION INTRAMUSCULAR; INTRAVENOUS EVERY 30 MIN PRN
Status: DISCONTINUED | OUTPATIENT
Start: 2024-12-06 | End: 2024-12-06 | Stop reason: HOSPADM

## 2024-12-06 RX ADMIN — FENTANYL CITRATE 50 MCG: 50 INJECTION, SOLUTION INTRAMUSCULAR; INTRAVENOUS at 09:38

## 2024-12-06 RX ADMIN — METHYLPREDNISOLONE ACETATE 80 MG: 80 INJECTION, SUSPENSION INTRA-ARTICULAR; INTRALESIONAL; INTRAMUSCULAR; SOFT TISSUE at 08:50

## 2024-12-06 RX ADMIN — SODIUM CHLORIDE, POTASSIUM CHLORIDE, SODIUM LACTATE AND CALCIUM CHLORIDE: 600; 310; 30; 20 INJECTION, SOLUTION INTRAVENOUS at 07:16

## 2024-12-06 RX ADMIN — OXYCODONE HYDROCHLORIDE AND ACETAMINOPHEN 1 TABLET: 5; 325 TABLET ORAL at 09:52

## 2024-12-06 RX ADMIN — LIDOCAINE HYDROCHLORIDE 110 MG: 10 INJECTION, SOLUTION EPIDURAL; INFILTRATION; INTRACAUDAL; PERINEURAL at 08:57

## 2024-12-06 RX ADMIN — TIZANIDINE 4 MG: 4 TABLET ORAL at 09:57

## 2024-12-06 ASSESSMENT — ACTIVITIES OF DAILY LIVING (ADL)
ADLS_ACUITY_SCORE: 15

## 2024-12-06 NOTE — PROGRESS NOTES
Procedure: cervical radiofrequancy ablation/rhizotomy, left    Radiologist:Dr. Haile    Sedation:Monitored Anesthesia Care (MAC) administered and documented by Anesthesia Care Provider  Moderate (conscious) sedation:      Time Out: Prior to the start of the procedure and with procedural staff participation, I verbally confirmed the patient s identity using two indicators, relevant allergies, that the procedure was appropriate and matched the consent or emergent situation, and that the correct equipment/implants were available. Immediately prior to starting the procedure I conducted the Time Out with the procedural staff and re-confirmed the patient s name, procedure, and site/side. (The Joint Commission universal protocol was followed.)  Yes     No specimens sent to lab.    Position:  prone    Pain:  0    Estimated Blood Loss: Minimal    There were  no complications and patient has no symptoms..      Tolerated procedure well.     Condition: Stable    Comments: See dictated procedure note for full details     Maryan Camilo RN

## 2024-12-06 NOTE — SEDATION DOCUMENTATION
Grounding patch removed. Skin under grounding patch intact, no redness or excoriation noted. Pt denies any pain when skin lightly touched where grounding patch had been.

## 2024-12-06 NOTE — SEDATION DOCUMENTATION
Grounding patch placed left lateral upper arm. Pt states no lotion or creams used on skin. Skin dry and intact. Grounding patch placed. Patch laying flat on skin with no wrinkles or bubbles noted.

## 2024-12-06 NOTE — OR NURSING
Patient and responsible adult given discharge instructions with no questions regarding instructions. Courtney score 20/20. Pain level 4/10.  Discharged from unit via wheelchair. Patient discharged to home with daughter.

## 2024-12-06 NOTE — DISCHARGE INSTRUCTIONS
Rhizotomy Discharge Instructions        Notify procedure physician:    ~Please contact the office: If you experience signs or symptoms of an infection: redness, swelling, drainage, fever (over 100.5*F) or if you have any change in your bowel or bladder control, have increased numbness, tingling or weakness in your feet, severe leg pain or arm symptoms. If you cannot reach the office, please proceed to the nearest Emergency Department (ER).    Medication:  ~You may resume taking your pre-injection medications immediately.  If you are taking a blood thinner please contact primary provider as when to resume.   Sedation:   ~If you received sedation during your procedure, you must have a responsible adult drive you home.  ~Avoid driving, operating heavy machinery, or making any major decisions for 24 hours after the procedure.     Diet:  ~Resume your previous diet    Activity  ~You may experience increased discomfort for 24-72 hours after the injection due to the irritation from the placement of the needle.  Limited activity and rest are recommended for this time period.  Avoid heavy lifting, unusual positions, vigorous exercise or other maneuvers which can exacerbate your pain. You may gradually resume regular activities as your discomfort subsides.  Maximum results may not be achieved until 2-12 weeks post procedure.    Care of injection site  ~Shower only (do not submerge body in water today).  It is common to have some pain and discomfort at the procedure site.  Use ice only for the first 24 hours. Apply ice to the area 20 minutes on then 20 minutes off. You may use moist heat or ice after 24 hours (whichever you prefer.) May removed band aids in the morning.       We will be contacting you to follow up on your procedure on the next business day.      Risks of radiofrequency denervation  More pain after the procedure for a period of time  Mild burning feeling that may last up to 3 weeks  Numbness in the area that may  last up to 4 months    IMMEDIATELY FOLLOWING YOUR RHIZOTOMY: Do not drive or operate machinery for the first twenty four hours after surgery. Do not make any important decisions for twenty four hours after surgery or while taking narcotic pain medications or sedatives. If you develop intractable nausea and vomiting or a severe headache please notify your doctor immediately.    FOLLOW-UP: You will contacted by the Interventional Radiology appointment at 3 months and 6 months to review the pain relief you received from the procedure. You do not need to follow up with anesthesia unless specifically instructed to do so.    QUESTIONS?: Please feel free to call your physician or the hospital  if you have any questions, and they will be happy to assist you.

## 2024-12-20 ENCOUNTER — APPOINTMENT (OUTPATIENT)
Dept: INTERVENTIONAL RADIOLOGY/VASCULAR | Facility: HOSPITAL | Age: 59
End: 2024-12-20
Attending: RADIOLOGY
Payer: COMMERCIAL

## 2024-12-20 ENCOUNTER — HOSPITAL ENCOUNTER (OUTPATIENT)
Facility: HOSPITAL | Age: 59
Discharge: HOME | End: 2024-12-20
Attending: RADIOLOGY | Admitting: RADIOLOGY
Payer: COMMERCIAL

## 2024-12-20 VITALS
RESPIRATION RATE: 16 BRPM | HEART RATE: 54 BPM | WEIGHT: 168 LBS | BODY MASS INDEX: 30.91 KG/M2 | TEMPERATURE: 97.2 F | DIASTOLIC BLOOD PRESSURE: 86 MMHG | HEIGHT: 62 IN | OXYGEN SATURATION: 93 % | SYSTOLIC BLOOD PRESSURE: 131 MMHG

## 2024-12-20 DIAGNOSIS — M47.812 CERVICAL SPONDYLOSIS WITHOUT MYELOPATHY: Primary | ICD-10-CM

## 2024-12-20 DIAGNOSIS — M47.812 ARTHROPATHY OF CERVICAL FACET JOINT: ICD-10-CM

## 2024-12-20 PROCEDURE — 250N000013 HC RX MED GY IP 250 OP 250 PS 637: Performed by: RADIOLOGY

## 2024-12-20 PROCEDURE — 710N000012 HC RECOVERY PHASE 2, PER MINUTE

## 2024-12-20 PROCEDURE — 250N000011 HC RX IP 250 OP 636: Performed by: RADIOLOGY

## 2024-12-20 PROCEDURE — 370N000017 HC ANESTHESIA TECHNICAL FEE, PER MIN

## 2024-12-20 PROCEDURE — 250N000011 HC RX IP 250 OP 636: Performed by: NURSE ANESTHETIST, CERTIFIED REGISTERED

## 2024-12-20 PROCEDURE — 96372 THER/PROPH/DIAG INJ SC/IM: CPT | Performed by: RADIOLOGY

## 2024-12-20 PROCEDURE — 64633 DESTROY CERV/THOR FACET JNT: CPT

## 2024-12-20 PROCEDURE — 250N000009 HC RX 250: Performed by: RADIOLOGY

## 2024-12-20 PROCEDURE — 999N000141 HC STATISTIC PRE-PROCEDURE NURSING ASSESSMENT

## 2024-12-20 RX ORDER — HYDROMORPHONE HYDROCHLORIDE 1 MG/ML
0.5 INJECTION, SOLUTION INTRAMUSCULAR; INTRAVENOUS; SUBCUTANEOUS EVERY 5 MIN PRN
Status: DISCONTINUED | OUTPATIENT
Start: 2024-12-20 | End: 2024-12-20 | Stop reason: HOSPADM

## 2024-12-20 RX ORDER — LIDOCAINE 40 MG/G
CREAM TOPICAL
Status: DISCONTINUED | OUTPATIENT
Start: 2024-12-20 | End: 2024-12-20 | Stop reason: HOSPADM

## 2024-12-20 RX ORDER — OXYCODONE AND ACETAMINOPHEN 5; 325 MG/1; MG/1
1 TABLET ORAL EVERY 6 HOURS PRN
Qty: 20 TABLET | Refills: 0 | Status: SHIPPED | OUTPATIENT
Start: 2024-12-20 | End: 2024-12-25

## 2024-12-20 RX ORDER — LABETALOL HYDROCHLORIDE 5 MG/ML
10 INJECTION, SOLUTION INTRAVENOUS
Status: DISCONTINUED | OUTPATIENT
Start: 2024-12-20 | End: 2024-12-20 | Stop reason: HOSPADM

## 2024-12-20 RX ORDER — SODIUM CHLORIDE, SODIUM LACTATE, POTASSIUM CHLORIDE, CALCIUM CHLORIDE 600; 310; 30; 20 MG/100ML; MG/100ML; MG/100ML; MG/100ML
INJECTION, SOLUTION INTRAVENOUS CONTINUOUS
Status: DISCONTINUED | OUTPATIENT
Start: 2024-12-20 | End: 2024-12-20 | Stop reason: HOSPADM

## 2024-12-20 RX ORDER — METHYLPREDNISOLONE ACETATE 80 MG/ML
80-160 INJECTION, SUSPENSION INTRA-ARTICULAR; INTRALESIONAL; INTRAMUSCULAR; SOFT TISSUE ONCE
Status: COMPLETED | OUTPATIENT
Start: 2024-12-20 | End: 2024-12-20

## 2024-12-20 RX ORDER — SODIUM CHLORIDE, SODIUM LACTATE, POTASSIUM CHLORIDE, CALCIUM CHLORIDE 600; 310; 30; 20 MG/100ML; MG/100ML; MG/100ML; MG/100ML
1000 INJECTION, SOLUTION INTRAVENOUS CONTINUOUS
Status: DISCONTINUED | OUTPATIENT
Start: 2024-12-20 | End: 2024-12-20 | Stop reason: HOSPADM

## 2024-12-20 RX ORDER — HYDRALAZINE HYDROCHLORIDE 20 MG/ML
2.5-5 INJECTION INTRAMUSCULAR; INTRAVENOUS EVERY 10 MIN PRN
Status: DISCONTINUED | OUTPATIENT
Start: 2024-12-20 | End: 2024-12-20 | Stop reason: HOSPADM

## 2024-12-20 RX ORDER — OXYCODONE AND ACETAMINOPHEN 5; 325 MG/1; MG/1
1 TABLET ORAL ONCE
Status: COMPLETED | OUTPATIENT
Start: 2024-12-20 | End: 2024-12-20

## 2024-12-20 RX ORDER — LIDOCAINE HYDROCHLORIDE 10 MG/ML
30 INJECTION, SOLUTION EPIDURAL; INFILTRATION; INTRACAUDAL; PERINEURAL ONCE
Status: COMPLETED | OUTPATIENT
Start: 2024-12-20 | End: 2024-12-20

## 2024-12-20 RX ORDER — ONDANSETRON 2 MG/ML
4 INJECTION INTRAMUSCULAR; INTRAVENOUS EVERY 30 MIN PRN
Status: DISCONTINUED | OUTPATIENT
Start: 2024-12-20 | End: 2024-12-20 | Stop reason: HOSPADM

## 2024-12-20 RX ORDER — FENTANYL CITRATE 50 UG/ML
50 INJECTION, SOLUTION INTRAMUSCULAR; INTRAVENOUS EVERY 5 MIN PRN
Status: DISCONTINUED | OUTPATIENT
Start: 2024-12-20 | End: 2024-12-20 | Stop reason: HOSPADM

## 2024-12-20 RX ORDER — DEXAMETHASONE SODIUM PHOSPHATE 10 MG/ML
4 INJECTION, SOLUTION INTRAMUSCULAR; INTRAVENOUS
Status: DISCONTINUED | OUTPATIENT
Start: 2024-12-20 | End: 2024-12-20 | Stop reason: HOSPADM

## 2024-12-20 RX ORDER — ONDANSETRON 4 MG/1
4 TABLET, ORALLY DISINTEGRATING ORAL EVERY 30 MIN PRN
Status: DISCONTINUED | OUTPATIENT
Start: 2024-12-20 | End: 2024-12-20 | Stop reason: HOSPADM

## 2024-12-20 RX ORDER — SODIUM CHLORIDE 9 MG/ML
INJECTION, SOLUTION INTRAVENOUS CONTINUOUS
Status: DISCONTINUED | OUTPATIENT
Start: 2024-12-20 | End: 2024-12-20 | Stop reason: HOSPADM

## 2024-12-20 RX ORDER — NALOXONE HYDROCHLORIDE 0.4 MG/ML
0.1 INJECTION, SOLUTION INTRAMUSCULAR; INTRAVENOUS; SUBCUTANEOUS
Status: DISCONTINUED | OUTPATIENT
Start: 2024-12-20 | End: 2024-12-20 | Stop reason: HOSPADM

## 2024-12-20 RX ADMIN — OXYCODONE HYDROCHLORIDE AND ACETAMINOPHEN 1 TABLET: 5; 325 TABLET ORAL at 10:58

## 2024-12-20 RX ADMIN — LIDOCAINE HYDROCHLORIDE 14 ML: 10 INJECTION, SOLUTION EPIDURAL; INFILTRATION; INTRACAUDAL; PERINEURAL at 08:58

## 2024-12-20 RX ADMIN — METHYLPREDNISOLONE ACETATE 80 MG: 80 INJECTION, SUSPENSION INTRA-ARTICULAR; INTRALESIONAL; INTRAMUSCULAR; SOFT TISSUE at 08:58

## 2024-12-20 RX ADMIN — HYDROMORPHONE HYDROCHLORIDE 0.5 MG: 1 INJECTION, SOLUTION INTRAMUSCULAR; INTRAVENOUS; SUBCUTANEOUS at 09:38

## 2024-12-20 RX ADMIN — FENTANYL CITRATE 50 MCG: 50 INJECTION, SOLUTION INTRAMUSCULAR; INTRAVENOUS at 09:20

## 2024-12-20 RX ADMIN — ONDANSETRON 4 MG: 2 INJECTION INTRAMUSCULAR; INTRAVENOUS at 09:55

## 2024-12-20 ASSESSMENT — ACTIVITIES OF DAILY LIVING (ADL)
ADLS_ACUITY_SCORE: 15

## 2024-12-20 NOTE — SEDATION DOCUMENTATION
Grounding patch removed, skin WDL. 2 bandaids to posterior cervical procedure area; clean, dry and intact.

## 2024-12-20 NOTE — SEDATION DOCUMENTATION
Patient transferred back to [facility] via cart. Face to face report given with opportunity to observe patient.    Report given to Prudencio CALLOWAY.    Nohelia Leon RN   12/20/2024  9:09 AM

## 2025-01-13 ENCOUNTER — HOSPITAL ENCOUNTER (OUTPATIENT)
Facility: HOSPITAL | Age: 60
End: 2025-01-13
Attending: ORTHOPAEDIC SURGERY | Admitting: ORTHOPAEDIC SURGERY
Payer: COMMERCIAL

## 2025-01-13 ENCOUNTER — PREP FOR PROCEDURE (OUTPATIENT)
Dept: SURGERY | Facility: CLINIC | Age: 60
End: 2025-01-13
Payer: COMMERCIAL

## 2025-01-13 DIAGNOSIS — M17.11 PRIMARY OSTEOARTHRITIS OF RIGHT KNEE: Primary | ICD-10-CM

## 2025-01-14 ENCOUNTER — THERAPY VISIT (OUTPATIENT)
Dept: PHYSICAL THERAPY | Facility: HOSPITAL | Age: 60
End: 2025-01-14
Attending: FAMILY MEDICINE
Payer: COMMERCIAL

## 2025-01-14 DIAGNOSIS — M54.2 CERVICALGIA: Primary | ICD-10-CM

## 2025-01-14 PROCEDURE — 97110 THERAPEUTIC EXERCISES: CPT | Mod: GP

## 2025-01-14 PROCEDURE — 97161 PT EVAL LOW COMPLEX 20 MIN: CPT | Mod: GP

## 2025-01-14 PROCEDURE — 999N000104 HC STATISTIC NO CHARGE

## 2025-01-14 NOTE — PROGRESS NOTES
"PHYSICAL THERAPY EVALUATION  Type of Visit: Evaluation       Fall Risk Screen:  Fall screen completed by: PT  Have you fallen 2 or more times in the past year?: No  Have you fallen and had an injury in the past year?: Yes  Timed Up and Go score (seconds): NT  Is patient a fall risk?: No    Subjective         Presenting condition or subjective complaint: neck and shoulder pain\" Debra presents to therapy today on referral of Dr. Morales for evaluation of Chronic neck and shoulder pain. Pt. Reports hx. Of chronic neck pain years in duration. With multiple ablations and injections. Recently,  pt. Fell in shower and hit her head. She feels that this may have begun pain flare. Since then has had multiple injections with Rhizotomy L/R. Pain on left side is improved but right feels as if someone is tearing her skin open. Additionally, she is experiencing daily headaches which are present laterally. Pt. Also experiencing significant pain and burning into R UE. She denies considerable weakness but does feel quite tense. She denies changes to gait consistent with ataxia or Lhermitte's sign. Pt. Voices goals for therapy of decreasing pain while restoring ROM and strength.     Date of onset: 12/31/24    Relevant medical history: Arthritis; Cancer; Depression; Incontinence; Menopause; Neck injury; Overweight; Pain at night or rest   Past Medical History:   Diagnosis Date    Anxiety     Depressive disorder     GERD (gastroesophageal reflux disease)       Dates & types of surgery: recetly had facet injections, epidural injections,medial branch blocks and rhizotomies    Prior diagnostic imaging/testing results: MRI   1. Mildly increased grade 1 anterolisthesis of C4-5 in flexion that  reduces with extension.  2. Multilevel degenerative changes most significant at C5-6 and C6-7.  Prior therapy history for the same diagnosis, illness or injury: No      Prior Level of Function  Transfers: Independent  Ambulation: Independent  ADL: " Independent  IADL:     Living Environment  Social support: With a significant other or spouse   Type of home: House   Stairs to enter the home:         Ramp: No   Stairs inside the home: Yes 10 Is there a railing: No     Help at home: None  Equipment owned:       Employment: Yes   Hobbies/Interests: darts bean bags crocheting and spending time with my ferdinand    Patient goals for therapy: work sleep and social life    Pain assessment: Pain present  See objective evaluation for additional pain details     Objective   CERVICAL SPINE EVALUATION  PAIN: Pain Level at Rest: 4/10  Pain Level with Use: 9/10  Pain Location: cervical spine, shoulder, and elbow  Pain Quality: Burning and Tearing open skin with sharp knife   Pain Frequency: constant  Pain is Worst: daytime  Pain is Exacerbated By: looking down, reaching up, work   Pain is Relieved By: cold, NSAIDs, and otc medications  Pain Progression: Unchanged  INTEGUMENTARY (edema, incisions): WNL  POSTURE: WNL    ROM:   (Degrees) Left AROM Right AROM    Cervical Flexion Min limitation with tension in posterior neck     Cervical Extension WNL    Cervical Side bend 20 20    Cervical Rotation 40 40    Thoracic Flexion WNL    Thoracic Extension WNL    Thoracic Rotation WNL WNL    Pain:   End Feel:     MYOTOMES:    Left Right   C1-2 (Neck Flexion)     C3 (Neck Side Bend)      C4 (Shrug) 5 5   C5 (Deltoid) 5 5   C6 (Biceps) 5 5   C7 (Triceps) 5 5   C8 (Thumb Ext) 5 5   T1 (Intrinsics) 5 5     DTR S:    Left Right   C5 (Biceps) 2 2   C6 (Brachioradialis) 2 2   C7 (Triceps) 2 2   L4 (Quad)     S1 (Achilles)       CORD SIGNS: Alegria negative L, Alegria negative R  DERMATOMES:    Left Right   C1 Normal (light touch) Normal (light touch)   C2 Normal (light touch) Normal (light touch)   C3 Hypo (light touch) Normal (light touch)   C4 Normal (light touch) Normal (light touch)   C5 Normal (light touch) Normal (light touch)   C6 Normal (light touch) Normal  (light touch)   C7 Normal (light touch) Normal (light touch)   C8 Normal (light touch) Normal (light touch)   T1 Normal (light touch) Normal (light touch)     NEURAL TENSION:    Left Right   SLR     SLR with DF     Femoral Nerve     Slump     Remigio (Lumbar)     Remigio (Thoracic)     Remigio (Cervical)     Median Positive Negative    Ulnar Negative  Negative    Radial  Negative  Negative      FLEXIBILITY: Decreased trapezius and levator noted bilateral     PALPATION:   + Tenderness At Location Left Right   Sternocleidomastoid     Scalenes     Rhomboids + +   Facet     Upper Trap + +   Levator + +   Erector Spinae + +   Suboccipitals +      SPINAL SEGMENTAL CONCLUSIONS:  Hypomobile with pain throughout. Recreation of right arm pain/tingling UPA C4-C5.       Assessment & Plan   CLINICAL IMPRESSIONS  Medical Diagnosis: cervicalgia    Treatment Diagnosis: Neck pain with headaches radiating pain with dural tension R UE.   Impression/Assessment: Patient is a 59 year old female with complaints of neck and Right UE pain. Clinical testing not favorable for true radiculopathy but she does have characteristics of muscle spasm with dural tension of right median nerve.  The following significant findings have been identified: Pain, Decreased ROM/flexibility, Decreased joint mobility, Decreased strength, Impaired sensation, Impaired muscle performance, and Decreased activity tolerance. These impairments interfere with their ability to perform self care tasks, work tasks, recreational activities, household chores, driving , household mobility, and community mobility as compared to previous level of function.     Clinical Decision Making (Complexity):  Clinical Presentation: Stable/Uncomplicated  Clinical Presentation Rationale: based on medical and personal factors listed in PT evaluation  Clinical Decision Making (Complexity): Low complexity    PLAN OF CARE  Treatment Interventions:  Modalities: Cryotherapy, E-stim, Hot Pack,  Iontophoresis, Mechanical Traction, Ultrasound, Vasoneumatic Device  Interventions: Gait Training, Manual Therapy, Neuromuscular Re-education, Therapeutic Activity, Therapeutic Exercise, Self-Care/Home Management    Long Term Goals     PT Goal 1  Goal Identifier: LTG #1  Goal Description: Pt. to be independent with correct performance of HEP for cervical mobility to begin self management of her condition.  Target Date: 03/11/25  PT Goal 2  Goal Identifier: LTG #2  Goal Description: PT. to report absence of Right UE pain to demonstrate centralization of UE symptoms.  Target Date: 03/11/25  PT Goal 3  Goal Identifier: LTG #3  Goal Description: Pt. to improve cervical ROM to WNL without pain to restore PLOF.  Target Date: 03/11/25  PT Goal 4  Goal Identifier: STG #1  Goal Description: Pt. to decrease pain at worst by 50% or better to restore comfort for ADL's and work activities.  Target Date: 02/13/25      Frequency of Treatment: 1x week  Duration of Treatment: 8 weeks    Recommended Referrals to Other Professionals:   Education Assessment:   Learner/Method: Patient;Reading;Listening;Demonstration;Pictures/Video;No Barriers to Learning    Risks and benefits of evaluation/treatment have been explained.   Patient/Family/caregiver agrees with Plan of Care.     Evaluation Time:     PT Eval, Low Complexity Minutes (27717): 37       Signing Clinician: Jerome Vital PT

## 2025-01-21 ENCOUNTER — THERAPY VISIT (OUTPATIENT)
Dept: PHYSICAL THERAPY | Facility: HOSPITAL | Age: 60
End: 2025-01-21
Attending: FAMILY MEDICINE
Payer: COMMERCIAL

## 2025-01-21 DIAGNOSIS — M54.2 CERVICALGIA: Primary | ICD-10-CM

## 2025-01-21 PROCEDURE — 97110 THERAPEUTIC EXERCISES: CPT | Mod: GP,CQ

## 2025-01-21 PROCEDURE — 97140 MANUAL THERAPY 1/> REGIONS: CPT | Mod: GP,CQ

## 2025-01-24 PROBLEM — Z53.9 ERRONEOUS ENCOUNTER--DISREGARD: Status: ACTIVE | Noted: 2025-01-24

## 2025-02-06 ENCOUNTER — THERAPY VISIT (OUTPATIENT)
Dept: PHYSICAL THERAPY | Facility: HOSPITAL | Age: 60
End: 2025-02-06
Attending: FAMILY MEDICINE
Payer: COMMERCIAL

## 2025-02-06 DIAGNOSIS — M54.2 CERVICALGIA: Primary | ICD-10-CM

## 2025-02-06 PROCEDURE — 97140 MANUAL THERAPY 1/> REGIONS: CPT | Mod: GP,CQ

## 2025-02-06 PROCEDURE — 97110 THERAPEUTIC EXERCISES: CPT | Mod: GP,CQ

## 2025-03-11 ENCOUNTER — MEDICAL CORRESPONDENCE (OUTPATIENT)
Dept: MRI IMAGING | Facility: HOSPITAL | Age: 60
End: 2025-03-11

## 2025-03-11 ENCOUNTER — TELEPHONE (OUTPATIENT)
Dept: INTERVENTIONAL RADIOLOGY/VASCULAR | Facility: HOSPITAL | Age: 60
End: 2025-03-11

## 2025-03-11 NOTE — TELEPHONE ENCOUNTER
3 months POST RHIZOTOMY CALL    Procedure: Left C3-4, C4-5 Cervical Rhizotomy.  Radiologist(s): Dr. Donavan Haile  Date of Procedure: 12/6/24    The patient had no questions or concerns.    Relief of pain from Rhizotomy    A = 90%  A- = 85%  B = 80%  B- = 75%  C = 70%  C- = 65%  D = 60%  D- = 50%  F = less than 50%    Where is the pain? On the left side patient states pain is just below the hairline  Does the pain radiate anywhere? No not on the left side.  If so, where does it stop? N/A  Is this new pain? No    Do you feel this procedure was beneficial? For 1 month patient received 60% of relief    How long did you feel relief  and what percentage relief do you feel you have gotten? For 1 month patient received 60% of relief. Now at the 3 month f/up patient is feeling no relief. 0% of relief    When did you pain start returning? 2 months ago    What has your pain at now? 5/10 on the left side        Svetlana Basurto

## 2025-03-24 ENCOUNTER — TELEPHONE (OUTPATIENT)
Dept: INTERVENTIONAL RADIOLOGY/VASCULAR | Facility: HOSPITAL | Age: 60
End: 2025-03-24

## 2025-03-24 NOTE — TELEPHONE ENCOUNTER
3 months POST RHIZOTOMY CALL    Procedure: Right C3-4, C4-5 Cervical Rhizotomy.  Radiologist(s): Dr. Donavan Haile  Date of Procedure: 12/20/24    The patient had no questions or concerns.    Relief of pain from Rhizotomy    A = 90%  A- = 85%  B = 80%  B- = 75%  C = 70%  C- = 65%  D = 60%  D- = 50%  F = less than 50%    Where is the pain? Pain is on the right side above the hairline  Does the pain radiate anywhere?  Yes  If so, where does it stop?  It travels down the rt shoulder on the top then down the inside of the arm and the ring finger will go numb  Is this new pain? No    Do you feel this procedure was beneficial? Patient got maybe 1 month of relief, at 30% of relief.    How long did you feel relief  and what percentage relief do you feel you have gotten? It only lasted 1 month and patient started to come back. She is at 0% of relief.    When did you pain start returning? 2 months ago    What has your pain at now? 7/10        Svetlana Basurto

## 2025-03-25 ENCOUNTER — HOSPITAL ENCOUNTER (OUTPATIENT)
Dept: MRI IMAGING | Facility: HOSPITAL | Age: 60
Discharge: HOME | End: 2025-03-25
Attending: STUDENT IN AN ORGANIZED HEALTH CARE EDUCATION/TRAINING PROGRAM | Admitting: STUDENT IN AN ORGANIZED HEALTH CARE EDUCATION/TRAINING PROGRAM
Payer: COMMERCIAL

## 2025-03-25 DIAGNOSIS — M54.12 RADICULOPATHY, CERVICAL REGION: ICD-10-CM

## 2025-03-25 PROCEDURE — 72141 MRI NECK SPINE W/O DYE: CPT

## 2025-03-26 ENCOUNTER — THERAPY VISIT (OUTPATIENT)
Dept: PHYSICAL THERAPY | Facility: HOSPITAL | Age: 60
End: 2025-03-26
Attending: FAMILY MEDICINE
Payer: COMMERCIAL

## 2025-03-26 DIAGNOSIS — M54.2 CERVICALGIA: Primary | ICD-10-CM

## 2025-03-26 PROCEDURE — 97530 THERAPEUTIC ACTIVITIES: CPT | Mod: GP

## 2025-03-26 PROCEDURE — 97110 THERAPEUTIC EXERCISES: CPT | Mod: GP

## 2025-04-07 ENCOUNTER — THERAPY VISIT (OUTPATIENT)
Dept: PHYSICAL THERAPY | Facility: HOSPITAL | Age: 60
End: 2025-04-07
Attending: FAMILY MEDICINE
Payer: COMMERCIAL

## 2025-04-07 DIAGNOSIS — M54.2 CERVICALGIA: Primary | ICD-10-CM

## 2025-04-07 PROCEDURE — 97110 THERAPEUTIC EXERCISES: CPT | Mod: GP

## 2025-05-28 ENCOUNTER — APPOINTMENT (OUTPATIENT)
Dept: GENERAL RADIOLOGY | Facility: HOSPITAL | Age: 60
End: 2025-05-28
Attending: EMERGENCY MEDICINE
Payer: COMMERCIAL

## 2025-05-28 ENCOUNTER — APPOINTMENT (OUTPATIENT)
Dept: CT IMAGING | Facility: HOSPITAL | Age: 60
End: 2025-05-28
Attending: EMERGENCY MEDICINE
Payer: COMMERCIAL

## 2025-05-28 ENCOUNTER — HOSPITAL ENCOUNTER (EMERGENCY)
Facility: HOSPITAL | Age: 60
Discharge: HOME | End: 2025-05-28
Attending: EMERGENCY MEDICINE
Payer: COMMERCIAL

## 2025-05-28 VITALS
SYSTOLIC BLOOD PRESSURE: 131 MMHG | RESPIRATION RATE: 20 BRPM | DIASTOLIC BLOOD PRESSURE: 86 MMHG | OXYGEN SATURATION: 93 % | BODY MASS INDEX: 30.05 KG/M2 | WEIGHT: 164.3 LBS | HEART RATE: 71 BPM | TEMPERATURE: 98.6 F

## 2025-05-28 DIAGNOSIS — R06.00 DYSPNEA, UNSPECIFIED TYPE: ICD-10-CM

## 2025-05-28 DIAGNOSIS — R07.9 CHEST PAIN, UNSPECIFIED TYPE: ICD-10-CM

## 2025-05-28 DIAGNOSIS — J45.901 EXACERBATION OF ASTHMA, UNSPECIFIED ASTHMA SEVERITY, UNSPECIFIED WHETHER PERSISTENT: Primary | ICD-10-CM

## 2025-05-28 DIAGNOSIS — K21.00 GASTROESOPHAGEAL REFLUX DISEASE WITH ESOPHAGITIS WITHOUT HEMORRHAGE: ICD-10-CM

## 2025-05-28 LAB
ALBUMIN SERPL BCG-MCNC: 4.2 G/DL (ref 3.5–5.2)
ALP SERPL-CCNC: 66 U/L (ref 40–150)
ALT SERPL W P-5'-P-CCNC: 42 U/L (ref 0–50)
ANION GAP SERPL CALCULATED.3IONS-SCNC: 11 MMOL/L (ref 7–15)
AST SERPL W P-5'-P-CCNC: 26 U/L (ref 0–45)
ATRIAL RATE - MUSE: 77 BPM
BASOPHILS # BLD AUTO: 0.1 10E3/UL (ref 0–0.2)
BASOPHILS NFR BLD AUTO: 1 %
BILIRUB SERPL-MCNC: 0.2 MG/DL
BUN SERPL-MCNC: 12 MG/DL (ref 6–20)
CALCIUM SERPL-MCNC: 9.6 MG/DL (ref 8.8–10.4)
CHLORIDE SERPL-SCNC: 103 MMOL/L (ref 98–107)
CREAT SERPL-MCNC: 0.93 MG/DL (ref 0.51–0.95)
DIASTOLIC BLOOD PRESSURE - MUSE: NORMAL MMHG
EGFRCR SERPLBLD CKD-EPI 2021: 71 ML/MIN/1.73M2
EOSINOPHIL # BLD AUTO: 0.8 10E3/UL (ref 0–0.7)
EOSINOPHIL NFR BLD AUTO: 11 %
ERYTHROCYTE [DISTWIDTH] IN BLOOD BY AUTOMATED COUNT: 11.5 % (ref 10–15)
FLUAV RNA SPEC QL NAA+PROBE: NEGATIVE
FLUBV RNA RESP QL NAA+PROBE: NEGATIVE
GLUCOSE SERPL-MCNC: 105 MG/DL (ref 70–99)
HCO3 SERPL-SCNC: 25 MMOL/L (ref 22–29)
HCT VFR BLD AUTO: 41.5 % (ref 35–47)
HGB BLD-MCNC: 13.5 G/DL (ref 11.7–15.7)
HOLD SPECIMEN: NORMAL
HOLD SPECIMEN: NORMAL
IMM GRANULOCYTES # BLD: 0 10E3/UL
IMM GRANULOCYTES NFR BLD: 0 %
INTERPRETATION ECG - MUSE: NORMAL
LYMPHOCYTES # BLD AUTO: 1.4 10E3/UL (ref 0.8–5.3)
LYMPHOCYTES NFR BLD AUTO: 20 %
MCH RBC QN AUTO: 29.9 PG (ref 26.5–33)
MCHC RBC AUTO-ENTMCNC: 32.5 G/DL (ref 31.5–36.5)
MCV RBC AUTO: 92 FL (ref 78–100)
MONOCYTES # BLD AUTO: 0.7 10E3/UL (ref 0–1.3)
MONOCYTES NFR BLD AUTO: 10 %
NEUTROPHILS # BLD AUTO: 4.1 10E3/UL (ref 1.6–8.3)
NEUTROPHILS NFR BLD AUTO: 58 %
NRBC # BLD AUTO: 0 10E3/UL
NRBC BLD AUTO-RTO: 0 /100
P AXIS - MUSE: 43 DEGREES
PLATELET # BLD AUTO: 269 10E3/UL (ref 150–450)
POTASSIUM SERPL-SCNC: 4.3 MMOL/L (ref 3.4–5.3)
PR INTERVAL - MUSE: 124 MS
PROT SERPL-MCNC: 7.2 G/DL (ref 6.4–8.3)
QRS DURATION - MUSE: 76 MS
QT - MUSE: 414 MS
QTC - MUSE: 468 MS
R AXIS - MUSE: -47 DEGREES
RBC # BLD AUTO: 4.52 10E6/UL (ref 3.8–5.2)
RSV RNA SPEC NAA+PROBE: NEGATIVE
SARS-COV-2 RNA RESP QL NAA+PROBE: NEGATIVE
SODIUM SERPL-SCNC: 139 MMOL/L (ref 135–145)
SYSTOLIC BLOOD PRESSURE - MUSE: NORMAL MMHG
T AXIS - MUSE: 59 DEGREES
TROPONIN T SERPL HS-MCNC: <6 NG/L
VENTRICULAR RATE- MUSE: 77 BPM
WBC # BLD AUTO: 7.1 10E3/UL (ref 4–11)

## 2025-05-28 PROCEDURE — 84484 ASSAY OF TROPONIN QUANT: CPT | Performed by: EMERGENCY MEDICINE

## 2025-05-28 PROCEDURE — 96374 THER/PROPH/DIAG INJ IV PUSH: CPT | Mod: XU

## 2025-05-28 PROCEDURE — 85018 HEMOGLOBIN: CPT | Performed by: EMERGENCY MEDICINE

## 2025-05-28 PROCEDURE — 36415 COLL VENOUS BLD VENIPUNCTURE: CPT | Performed by: EMERGENCY MEDICINE

## 2025-05-28 PROCEDURE — 250N000011 HC RX IP 250 OP 636: Performed by: RADIOLOGY

## 2025-05-28 PROCEDURE — 999N000157 HC STATISTIC RCP TIME EA 10 MIN

## 2025-05-28 PROCEDURE — 99285 EMERGENCY DEPT VISIT HI MDM: CPT | Mod: 25

## 2025-05-28 PROCEDURE — 85025 COMPLETE CBC W/AUTO DIFF WBC: CPT | Performed by: EMERGENCY MEDICINE

## 2025-05-28 PROCEDURE — 71046 X-RAY EXAM CHEST 2 VIEWS: CPT | Mod: 26 | Performed by: RADIOLOGY

## 2025-05-28 PROCEDURE — 99284 EMERGENCY DEPT VISIT MOD MDM: CPT | Performed by: EMERGENCY MEDICINE

## 2025-05-28 PROCEDURE — 71046 X-RAY EXAM CHEST 2 VIEWS: CPT

## 2025-05-28 PROCEDURE — 96375 TX/PRO/DX INJ NEW DRUG ADDON: CPT | Mod: XU

## 2025-05-28 PROCEDURE — 80053 COMPREHEN METABOLIC PANEL: CPT | Performed by: EMERGENCY MEDICINE

## 2025-05-28 PROCEDURE — 93005 ELECTROCARDIOGRAM TRACING: CPT

## 2025-05-28 PROCEDURE — 250N000009 HC RX 250: Performed by: EMERGENCY MEDICINE

## 2025-05-28 PROCEDURE — 71275 CT ANGIOGRAPHY CHEST: CPT | Mod: 26 | Performed by: RADIOLOGY

## 2025-05-28 PROCEDURE — 94640 AIRWAY INHALATION TREATMENT: CPT

## 2025-05-28 PROCEDURE — 71275 CT ANGIOGRAPHY CHEST: CPT

## 2025-05-28 PROCEDURE — 93010 ELECTROCARDIOGRAM REPORT: CPT | Performed by: INTERNAL MEDICINE

## 2025-05-28 PROCEDURE — 250N000011 HC RX IP 250 OP 636: Performed by: EMERGENCY MEDICINE

## 2025-05-28 PROCEDURE — 87637 SARSCOV2&INF A&B&RSV AMP PRB: CPT | Performed by: EMERGENCY MEDICINE

## 2025-05-28 RX ORDER — IPRATROPIUM BROMIDE AND ALBUTEROL SULFATE 2.5; .5 MG/3ML; MG/3ML
3 SOLUTION RESPIRATORY (INHALATION) ONCE
Status: COMPLETED | OUTPATIENT
Start: 2025-05-28 | End: 2025-05-28

## 2025-05-28 RX ORDER — IOPAMIDOL 755 MG/ML
58 INJECTION, SOLUTION INTRAVASCULAR ONCE
Status: COMPLETED | OUTPATIENT
Start: 2025-05-28 | End: 2025-05-28

## 2025-05-28 RX ORDER — DEXAMETHASONE SODIUM PHOSPHATE 10 MG/ML
10 INJECTION, SOLUTION INTRAMUSCULAR; INTRAVENOUS ONCE
Status: COMPLETED | OUTPATIENT
Start: 2025-05-28 | End: 2025-05-28

## 2025-05-28 RX ORDER — PREDNISONE 20 MG/1
TABLET ORAL
Qty: 10 TABLET | Refills: 0 | Status: SHIPPED | OUTPATIENT
Start: 2025-05-29

## 2025-05-28 RX ORDER — HYDROCODONE BITARTRATE AND ACETAMINOPHEN 5; 325 MG/1; MG/1
1 TABLET ORAL
COMMUNITY
Start: 2025-05-23

## 2025-05-28 RX ORDER — IPRATROPIUM BROMIDE AND ALBUTEROL SULFATE 2.5; .5 MG/3ML; MG/3ML
1 SOLUTION RESPIRATORY (INHALATION) EVERY 6 HOURS PRN
Qty: 90 ML | Refills: 0 | Status: SHIPPED | OUTPATIENT
Start: 2025-05-28

## 2025-05-28 RX ADMIN — IOPAMIDOL 58 ML: 755 INJECTION, SOLUTION INTRAVENOUS at 11:38

## 2025-05-28 RX ADMIN — IPRATROPIUM BROMIDE AND ALBUTEROL SULFATE 3 ML: .5; 3 SOLUTION RESPIRATORY (INHALATION) at 10:13

## 2025-05-28 RX ADMIN — DEXAMETHASONE SODIUM PHOSPHATE 10 MG: 10 INJECTION, SOLUTION INTRAMUSCULAR; INTRAVENOUS at 11:44

## 2025-05-28 RX ADMIN — PANTOPRAZOLE SODIUM 40 MG: 40 INJECTION, POWDER, FOR SOLUTION INTRAVENOUS at 11:44

## 2025-05-28 ASSESSMENT — ACTIVITIES OF DAILY LIVING (ADL)
ADLS_ACUITY_SCORE: 41

## 2025-05-28 ASSESSMENT — COLUMBIA-SUICIDE SEVERITY RATING SCALE - C-SSRS
2. HAVE YOU ACTUALLY HAD ANY THOUGHTS OF KILLING YOURSELF IN THE PAST MONTH?: NO
6. HAVE YOU EVER DONE ANYTHING, STARTED TO DO ANYTHING, OR PREPARED TO DO ANYTHING TO END YOUR LIFE?: NO
1. IN THE PAST MONTH, HAVE YOU WISHED YOU WERE DEAD OR WISHED YOU COULD GO TO SLEEP AND NOT WAKE UP?: NO

## 2025-05-28 NOTE — ED TRIAGE NOTES
Pt presents with c/o a dry cough, chest heaviness, and left shoulder pain that has gotten progressively worse over the past 3 days. Pt had a diskectomy and fusion of c-4 to c-5 and c-5 to c-6 on May 19th with anterior approach.

## 2025-05-28 NOTE — ED NOTES
Pt chest pain and burning improved after Protonix.    Patient discharged to Home at this time. Patient given written and verbal discharge instructions regarding home care, follow-up, and medications. Patient verbalized understanding of all discharge instructions. Rest and hydration encouraged. Patient encouraged to return to the ED if they experience new, worsening, or concerning symptoms.     Patients RX for Duoneb and prednisone sent to [facility] pharmacy.    Increase omeprazole to 40 mg daily x1 week.    Patient to follow-up with PCP in 1 week.

## 2025-05-28 NOTE — ED NOTES
Pt presents with dry cough and worsening SOB over the past week. Pt reports she had a discectomy with fusion of the cervical spine a week ago.    Pt reports it's difficult to take a deep breath. She gets mid sternal chest pain/burning that goes through to the back along the left scapula. The also notes some left shoulder pain. Pt states she's had a sore throat, but believes that is due to the surgery.    Pt reports hx of exercise induced asthma along with seasonal allergies. Pt notes she has been using her rescue inhaler 14 times daily 2 puffs at a time for the past 5 days. She notes temporary relief with the inhaler.     Pt denies personal cardiac hx but notes family hx.

## 2025-05-28 NOTE — ED PROVIDER NOTES
History     Chief Complaint   Patient presents with    Shortness of Breath     HPI  Debra SAHARA Cannon is a 60 year old female who     Pt presents with dry cough and worsening SOB over the past week. Pt reports she had a discectomy with fusion of the cervical spine a week ago.     Pt reports it's difficult to take a deep breath. She gets mid sternal chest pain/burning that goes through to the back along the left scapula. The also notes some left shoulder pain. Pt states she's had a sore throat, but believes that is due to the surgery.     Pt reports hx of exercise induced asthma along with seasonal allergies. Pt notes she has been using her rescue inhaler 14 times daily 2 puffs at a time for the past 5 days. She notes temporary relief with the inhaler.      Pt denies personal history of any PE or cardiac history.  Patient's been having some burning-like chest discomfort it is worse with swallowing she has associated nasal congestion and sore throat, denies any fevers cough is nonproductive       Allergies:  Allergies   Allergen Reactions    Amoxicillin-Pot Clavulanate Nausea and Vomiting    Cats Swelling    Other Environmental Allergy      hay       Problem List:    Patient Active Problem List    Diagnosis Date Noted    ERRONEOUS ENCOUNTER--DISREGARD 01/24/2025     Priority: Medium    Cervicalgia 01/14/2025     Priority: Medium    Facial cellulitis 08/29/2016     Priority: Medium        Past Medical History:    Past Medical History:   Diagnosis Date    Anxiety     Depressive disorder     GERD (gastroesophageal reflux disease)        Past Surgical History:    Past Surgical History:   Procedure Laterality Date    BREAST SURGERY      augmentation    CHOLECYSTECTOMY      ENT SURGERY      T&A    GYN SURGERY      endo ablation    ORTHOPEDIC SURGERY      rotator cuff repair    SOFT TISSUE SURGERY      basal cell ca    SURGICAL RADIOLOGY PROCEDURE Left 12/6/2024    Procedure: Image Guided Cervical Rhizotomy LEFT C3-4,  C4-5;  Surgeon: Donavan Haile MD;  Location: [facility]       Family History:    Family History   Problem Relation Age of Onset    Hypertension Mother     Hypertension Father     Diabetes Father     Hyperlipidemia Father        Social History:  Marital Status:   [2]  Social History     Tobacco Use    Smoking status: Every Day     Types: Vaping Device    Smokeless tobacco: Never   Vaping Use    Vaping status: Every Day    Substances: Nicotine   Substance Use Topics    Alcohol use: Yes     Comment: social    Drug use: No        Medications:    acetaminophen-codeine (TYLENOL #3) 300-30 MG tablet  albuterol (ALBUTEROL) 108 (90 BASE) MCG/ACT Inhaler  ALPRAZolam (XANAX) 0.25 MG tablet  citalopram (CELEXA) 20 MG tablet  gabapentin (NEURONTIN) 100 MG capsule  HYDROcodone-acetaminophen (NORCO) 5-325 MG tablet  ibuprofen (ADVIL/MOTRIN) 200 MG tablet  omeprazole 20 MG tablet  tiZANidine (ZANAFLEX) 4 MG tablet          Review of Systems   All other systems reviewed and are negative.      Physical Exam   BP: (!) 143/92  Pulse: 86  Temp: 98.6  F (37  C)  Resp: 16  Weight: 74.5 kg (164 lb 4.8 oz)  SpO2: 94 %      Physical Exam  Vitals and nursing note reviewed.   Constitutional:       General: She is not in acute distress.     Appearance: Normal appearance. She is not ill-appearing, toxic-appearing or diaphoretic.   HENT:      Head: Normocephalic and atraumatic.      Mouth/Throat:      Mouth: Mucous membranes are moist.      Pharynx: Oropharyngeal exudate present. No pharyngeal swelling.   Eyes:      General: No scleral icterus.     Extraocular Movements: Extraocular movements intact.      Conjunctiva/sclera: Conjunctivae normal.   Neck:      Comments: Incision site appears to be healing well  Cardiovascular:      Rate and Rhythm: Normal rate and regular rhythm.      Heart sounds: Normal heart sounds.   Pulmonary:      Effort: Pulmonary effort is normal. No respiratory distress.      Breath sounds: Examination of the  left-lower field reveals wheezing and rales. Wheezing and rales present. No decreased breath sounds.   Abdominal:      General: Abdomen is flat.   Musculoskeletal:      Cervical back: Neck supple.      Right lower leg: No edema.      Left lower leg: No edema.   Skin:     General: Skin is warm and dry.      Capillary Refill: Capillary refill takes less than 2 seconds.      Findings: No rash.   Neurological:      General: No focal deficit present.      Mental Status: She is alert and oriented to person, place, and time.   Psychiatric:         Mood and Affect: Mood normal.         Behavior: Behavior normal.         ED Course        Procedures              Critical Care time:  none     None       EKG done at 0 950 reviewed at 0 950 agree with below interpretation  Results for orders placed or performed during the hospital encounter of 05/28/25 (from the past 24 hours)   EKG 12-lead, tracing only   Result Value Ref Range    Systolic Blood Pressure  mmHg    Diastolic Blood Pressure  mmHg    Ventricular Rate 77 BPM    Atrial Rate 77 BPM    NH Interval 124 ms    QRS Duration 76 ms     ms    QTc 468 ms    P Axis 43 degrees    R AXIS -47 degrees    T Axis 59 degrees    Interpretation ECG       Sinus rhythm  Left anterior fascicular block  Nonspecific ST and T wave abnormality  Prolonged QT  Abnormal ECG  No previous ECGs available     [place] Draw *Canceled*    Narrative    The following orders were created for panel order [place] Draw.  Procedure                               Abnormality         Status                     ---------                               -----------         ------                       Please view results for these tests on the individual orders.   [place] Draw    Narrative    The following orders were created for panel order [place] Draw.  Procedure                               Abnormality         Status                     ---------                               -----------         ------                      Extra Blue Top Tube[[id number]]                             In process                 Extra Red Top Tube[[id number]]                              In process                 Extra Green Top (Lithiu...[[id number]]                                                 Extra Green Top (Lithiu...[[id number]]                                                 Extra Purple Top Tube[[id number]]                                                        Please view results for these tests on the individual orders.   CBC with Platelets & Differential    Narrative    The following orders were created for panel order CBC with Platelets & Differential.  Procedure                               Abnormality         Status                     ---------                               -----------         ------                     CBC with platelets and ...[[id number]]  Abnormal            Final result                 Please view results for these tests on the individual orders.   Troponin T, High Sensitivity   Result Value Ref Range    Troponin T, High Sensitivity <6 <=14 ng/L   Comprehensive metabolic panel   Result Value Ref Range    Sodium 139 135 - 145 mmol/L    Potassium 4.3 3.4 - 5.3 mmol/L    Carbon Dioxide (CO2) 25 22 - 29 mmol/L    Anion Gap 11 7 - 15 mmol/L    Urea Nitrogen 12.0 6.0 - 20.0 mg/dL    Creatinine 0.93 0.51 - 0.95 mg/dL    GFR Estimate 71 >60 mL/min/1.73m2    Calcium 9.6 8.8 - 10.4 mg/dL    Chloride 103 98 - 107 mmol/L    Glucose 105 (H) 70 - 99 mg/dL    Alkaline Phosphatase 66 40 - 150 U/L    AST 26 0 - 45 U/L    ALT 42 0 - 50 U/L    Protein Total 7.2 6.4 - 8.3 g/dL    Albumin 4.2 3.5 - 5.2 g/dL    Bilirubin Total 0.2 <=1.2 mg/dL   CBC with platelets and differential   Result Value Ref Range    WBC Count 7.1 4.0 - 11.0 10e3/uL    RBC Count 4.52 3.80 - 5.20 10e6/uL    Hemoglobin 13.5 11.7 - 15.7 g/dL    Hematocrit 41.5 35.0 - 47.0 %    MCV 92 78 - 100 fL    MCH 29.9 26.5 - 33.0 pg    MCHC 32.5 31.5 - 36.5  g/dL    RDW 11.5 10.0 - 15.0 %    Platelet Count 269 150 - 450 10e3/uL    % Neutrophils 58 %    % Lymphocytes 20 %    % Monocytes 10 %    % Eosinophils 11 %    % Basophils 1 %    % Immature Granulocytes 0 %    NRBCs per 100 WBC 0 <1 /100    Absolute Neutrophils 4.1 1.6 - 8.3 10e3/uL    Absolute Lymphocytes 1.4 0.8 - 5.3 10e3/uL    Absolute Monocytes 0.7 0.0 - 1.3 10e3/uL    Absolute Eosinophils 0.8 (H) 0.0 - 0.7 10e3/uL    Absolute Basophils 0.1 0.0 - 0.2 10e3/uL    Absolute Immature Granulocytes 0.0 <=0.4 10e3/uL    Absolute NRBCs 0.0 10e3/uL   XR Chest 2 Views    Narrative    PROCEDURE:  XR CHEST 2 VIEWS    HISTORY:  SOB, cough x1 week, recent surgery.     COMPARISON:  None.    FINDINGS:   The cardiac silhouette is normal in size. The pulmonary vasculature is  normal.  The lungs are clear. No pleural effusion or pneumothorax.      Impression    IMPRESSION:  No acute cardiopulmonary disease.      ISRAEL FREY MD         SYSTEM ID:  [id number]       Medications   dexAMETHasone PF (DECADRON) injection 10 mg (has no administration in time range)   pantoprazole (PROTONIX) IV push injection 40 mg (has no administration in time range)   iopamidol (ISOVUE-370) solution 58 mL (has no administration in time range)   sodium chloride (PF) 0.9% PF flush 100 mL (has no administration in time range)   ipratropium - albuterol 0.5 mg/2.5 mg/3 mL (DUONEB) neb solution 3 mL (3 mLs Nebulization $Given 5/28/25 1013)       Assessments & Plan (with Medical Decision Making)     I have reviewed the nursing notes.    I have reviewed the findings, diagnosis, plan and need for follow up with the patient.           Medical Decision Making  The patient's presentation was of high complexity (an acute health issue posing potential threat to life or bodily function).    The patient's evaluation involved:  ordering and/or review of 3+ test(s) in this encounter (EKG chest x-ray CT scan multiple lab investigations)    The patient's management  necessitated moderate risk (prescription drug management including medications given in the ED).    The patient is a 60 year old year old female with a past medical history as above of who presents to the ED with a complaint of shortness of breath nasal congestion, URI symptoms with cough occurring after having cervical discectomy and fusion.  History was obtained from the patient.  Presentation combined with history increase my concerns for pulmonary embolism, pneumothorax, pneumonia, COVID, influenza, RSV, myocardial infarction.  It was decided necessary to order ED investigations in order to properly assess patient's acute emergent complaint.  My independent interpretation of revealed and is in agreement with radiology interpretation showing chest x-ray negative CTA chest negative.  My independent review of EKG reveals normal sinus rhythm left anterior fascicular block nonspecific ST and T wave abnormalities prolonged QT.  ED labs reveal CBC chemistry profile nasal swab for flu COVID RSV is negative.  After prolonged ED observation interpretation of vital signs history physical ED studies feel the patient has viral URI or seasonal allergies with associated asthma exacerbation.  Shared decision-making was discussed in layman terms with the patient or caregiver and they agree with plan.  She was given dexamethasone in the ED and will start prednisone daily for 5 days albuterol inhaler as needed I gave her prescription for nebulizer with DuoNebs, continue her omeprazole for GERD may increase to 40 mg daily for a week, follow-up with PCP in a week's time return to ED if any concerns, strong return precautions were given.     New Prescriptions    No medications on file       Final diagnoses:   Dyspnea, unspecified type   Chest pain, unspecified type   Exacerbation of asthma, unspecified asthma severity, unspecified whether persistent       5/28/2025   [facility]       Chip Benjamin MD  05/28/25  0426

## 2025-05-31 LAB
ATRIAL RATE - MUSE: 77 BPM
DIASTOLIC BLOOD PRESSURE - MUSE: NORMAL MMHG
INTERPRETATION ECG - MUSE: NORMAL
P AXIS - MUSE: 43 DEGREES
PR INTERVAL - MUSE: 124 MS
QRS DURATION - MUSE: 76 MS
QT - MUSE: 414 MS
QTC - MUSE: 468 MS
R AXIS - MUSE: -47 DEGREES
SYSTOLIC BLOOD PRESSURE - MUSE: NORMAL MMHG
T AXIS - MUSE: 59 DEGREES
VENTRICULAR RATE- MUSE: 77 BPM

## 2025-06-04 ENCOUNTER — TELEPHONE (OUTPATIENT)
Dept: INTERVENTIONAL RADIOLOGY/VASCULAR | Facility: HOSPITAL | Age: 60
End: 2025-06-04

## 2025-06-05 ENCOUNTER — THERAPY VISIT (OUTPATIENT)
Dept: PHYSICAL THERAPY | Facility: HOSPITAL | Age: 60
End: 2025-06-05
Attending: FAMILY MEDICINE
Payer: COMMERCIAL

## 2025-06-05 DIAGNOSIS — M54.2 NECK PAIN: Primary | ICD-10-CM

## 2025-06-05 PROCEDURE — 97110 THERAPEUTIC EXERCISES: CPT | Mod: GP

## 2025-06-05 PROCEDURE — 999N000104 HC STATISTIC NO CHARGE

## 2025-06-05 PROCEDURE — 97161 PT EVAL LOW COMPLEX 20 MIN: CPT | Mod: GP

## 2025-06-05 NOTE — PROGRESS NOTES
"PHYSICAL THERAPY EVALUATION  Type of Visit: Evaluation       Fall Risk Screen:  Have you fallen 2 or more times in the past year?: No  Have you fallen and had an injury in the past year?: No    Subjective         Presenting condition or subjective complaint: Had a discectomy and fusion on two levwls\" jun presents to therapy today on referral of Dr. West with OA. She is s/p ACDF cervical C5-C5, C5-C6 on 5/19/25. She reports that she has been doing well since surgery. Pain voiced to be 4/10 at this gio with intermittent tingling into both UE. She does have a follow up with Dr. West today at 10 AM. Restrictions include no bending or twisting of cervical spine. Pt. Is performing HEP consisting of Cervical alphabet and circles of small amplitude. Anticipated return to work date of 7/19/25. 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 Goals foe therapy of decreasing pain and restoring PLOF.     Date of onset: 05/19/25    Relevant medical history: Arthritis; Asthma; Cancer; Incontinence; Menopause; Neck injury; Numbness or tingling in perianal area   Dates & types of surgery:      Prior diagnostic imaging/testing results: MRI; X-ray     Prior therapy history for the same diagnosis, illness or injury: Yes PT    Prior Level of Function  Transfers: Independent  Ambulation: Independent  ADL: Independent  IADL:     Living Environment  Social support: With a significant other or spouse   Type of home: House; 2-story   Stairs to enter the home: Yes 3 Is there a railing: No     Ramp: No   Stairs inside the home: Yes 15 Is there a railing: No     Help at home: None  Equipment owned:       Employment: Yes   Hobbies/Interests: Anything outdoors    Patient goals for therapy: Range of motion and reduce pain    Pain assessment: Pain present  See objective evaluation for additional pain details     Objective   CERVICAL SPINE EVALUATION  PAIN: Pain Level at Rest: 4/10  Pain Level with Use: 6/10  Pain Location: " cervical spine, thoracic spine, and shoulder  Pain Quality: Aching, Dull, and Sharp  Pain Frequency: constant  Pain is Worst: Movement   Pain is Exacerbated By: Movement   Pain is Relieved By: otc medications and Pain relievers   Pain Progression: Improved  INTEGUMENTARY (edema, incisions): Incision intact to anterior neck with visible healing. No signs of infection or compromise.   POSTURE: Guarded due to postoperative pain   GAIT:   Weightbearing Status: WBAT  Assistive Device(s): None  Gait Deviations: WNL  ROM:   (Degrees) Left AROM Right AROM    Cervical Flexion Not assessed due to post op restrictions     Cervical Extension Not assessed due to post op restrictions     Cervical Side bend Not assessed due to post op restrictions  Not assessed due to post op restrictions     Cervical Rotation Not assessed due to post op restrictions  Not assessed due to post op restrictions     Cervical Protrusion Not assessed due to post op restrictions     Cervical Retraction Not assessed due to post op restrictions     Thoracic Flexion Not assessed due to post op restrictions     Thoracic Extension Not assessed due to post op restrictions     Thoracic Rotation Not assessed due to post op restrictions  Not assessed due to post op restrictions     Shoulder: ROM WNL minor tension at end range with shoulder ER through scapular region.    MYOTOMES:    Left Right   C1-2 (Neck Flexion)     C3 (Neck Side Bend)      C4 (Shrug)     C5 (Deltoid)     C6 (Biceps) 5 5   C7 (Triceps) 5 5   C8 (Thumb Ext) 5 5   T1 (Intrinsics) 5 5     DTR S:    Left Right   C5 (Biceps) 2 2   C6 (Brachioradialis) 2 2   C7 (Triceps) 2 2   L4 (Quad)     S1 (Achilles)       CORD SIGNS: WNL  DERMATOMES:    Left Right   C1 Normal (light touch) Normal (light touch)   C2 Normal (light touch) Normal (light touch)   C3 Normal (light touch) Normal (light touch)   C4 Normal (light touch) Normal (light touch)   C5 Normal (light touch) Normal (light touch)   C6 Normal  (light touch) Normal (light touch)   C7 Normal (light touch) Normal (light touch)   C8 Hypo (light touch) Hypo (light touch)   T1 Normal (light touch) Normal (light touch)     FLEXIBILITY: Tension noted through bilateral trapezius due to post operative status   PALPATION:   + Tenderness At Location Left Right   Sternocleidomastoid     Scalenes     Rhomboids     Facet     Upper Trap + +   Levator + +   Erector Spinae     Suboccipitals       SPINAL SEGMENTAL CONCLUSIONS: Not assessed due to post operative status     Assessment & Plan   CLINICAL IMPRESSIONS  Medical Diagnosis: S/P ACDF C4-C5, C5-C6    Treatment Diagnosis: S/P ACDF   Impression/Assessment: Patient is a 60 year old S/P ACDF on 5/19/25.  The following significant findings have been identified: Pain, Decreased ROM/flexibility, Decreased joint mobility, Decreased strength, Impaired sensation, Impaired muscle performance, and Decreased activity tolerance. These impairments interfere with their ability to perform self care tasks, work tasks, recreational activities, household chores, household mobility, and community mobility as compared to previous level of function.     Clinical Decision Making (Complexity):  Clinical Presentation: Stable/Uncomplicated  Clinical Presentation Rationale: based on medical and personal factors listed in PT evaluation  Clinical Decision Making (Complexity): Low complexity    PLAN OF CARE  Treatment Interventions:  Modalities: Cryotherapy, Dry Needling, E-stim, Hot Pack, Iontophoresis, Mechanical Traction, Ultrasound, Vasoneumatic Device  Interventions: Gait Training, Manual Therapy, Neuromuscular Re-education, Therapeutic Activity, Therapeutic Exercise, Self-Care/Home Management    Long Term Goals     PT Goal 1  Goal Identifier: LTG #1  Goal Description: Pt. to be independent with correct performance of HEP for cervical mobility to begin self management of her condition.  Target Date: 08/14/25  PT Goal 2  Goal Identifier: LTG  #2  Goal Description: Pt. to improve cervical motiion to WFL in all planes restoring PLOF  Target Date: 08/14/25  PT Goal 3  Goal Identifier: LTG #3  Goal Description: Pt. to improve neck and shouider strength to 5/5 in all planes improving muscular performance with daily activities.  Target Date: 08/14/25  PT Goal 4  Goal Identifier: STG #1  Goal Description: Pt. to improve cervical rotation by 50% or greater improving mobility for driving and other daily tasks.  Target Date: 07/05/25      Frequency of Treatment: 1-2x week  Duration of Treatment: 10 weeks    Recommended Referrals to Other Professionals:   Education Assessment:   Learner/Method: Patient;Reading;Listening;Demonstration;Pictures/Video;No Barriers to Learning    Risks and benefits of evaluation/treatment have been explained.   Patient/Family/caregiver agrees with Plan of Care.     Evaluation Time:     PT Eval, Low Complexity Minutes (99586): 30       Signing Clinician: Jerome Vital PT

## 2025-06-12 ENCOUNTER — THERAPY VISIT (OUTPATIENT)
Dept: PHYSICAL THERAPY | Facility: HOSPITAL | Age: 60
End: 2025-06-12
Attending: FAMILY MEDICINE
Payer: COMMERCIAL

## 2025-06-12 DIAGNOSIS — M54.2 NECK PAIN: Primary | ICD-10-CM

## 2025-06-12 DIAGNOSIS — M54.2 CERVICALGIA: ICD-10-CM

## 2025-06-12 PROCEDURE — 97110 THERAPEUTIC EXERCISES: CPT | Mod: GP

## 2025-06-16 ENCOUNTER — THERAPY VISIT (OUTPATIENT)
Dept: PHYSICAL THERAPY | Facility: HOSPITAL | Age: 60
End: 2025-06-16
Attending: FAMILY MEDICINE
Payer: COMMERCIAL

## 2025-06-16 DIAGNOSIS — M54.2 NECK PAIN: Primary | ICD-10-CM

## 2025-06-16 DIAGNOSIS — M54.2 CERVICALGIA: ICD-10-CM

## 2025-06-16 PROCEDURE — 97110 THERAPEUTIC EXERCISES: CPT | Mod: GP

## 2025-06-18 ENCOUNTER — TELEPHONE (OUTPATIENT)
Dept: INTERVENTIONAL RADIOLOGY/VASCULAR | Facility: HOSPITAL | Age: 60
End: 2025-06-18

## 2025-06-18 NOTE — TELEPHONE ENCOUNTER
6 months POST RHIZOTOMY CALL    Procedure: Bilateral Cervical C3-4, C4-5 Rhizotomy.  Radiologist(s): Dr. Donavan Haile  Date of Procedure: RT: 12/20/24   LT: 12/6/24    The patient had no questions or concerns.    Relief of pain from Rhizotomy    A = 90%  A- = 85%  B = 80%  B- = 75%  C = 70%  C- = 65%  D = 60%  D- = 50%  F = less than 50%    Where is the pain? Her pain on both side starts a little bit above the hairline, bilateral  Does the pain radiate anywhere? Yes  If so, where does it stop? The pain goes down both the top and the back of the shoulders. Then down the inside of both arms, more like a numbness to the pink and ring finger on both sides   Is this new pain? No    Do you feel this procedure was beneficial? Not really    How long did you feel relief  and what percentage relief do you feel you have gotten? Patient felt like she got 1 month of relief at 40% of relief.    When did you pain start returning? About a couple months after the procedure     What has your pain at now? 8/10    Patient got a anterior discectomy infusion C4-5 and C5-6. Feeling a lot better.    Svetlana Basurto

## 2025-06-19 ENCOUNTER — THERAPY VISIT (OUTPATIENT)
Dept: PHYSICAL THERAPY | Facility: HOSPITAL | Age: 60
End: 2025-06-19
Attending: FAMILY MEDICINE
Payer: COMMERCIAL

## 2025-06-19 DIAGNOSIS — M54.2 CERVICALGIA: ICD-10-CM

## 2025-06-19 DIAGNOSIS — M54.2 NECK PAIN: Primary | ICD-10-CM

## 2025-06-19 PROCEDURE — 97110 THERAPEUTIC EXERCISES: CPT | Mod: GP

## 2025-06-19 PROCEDURE — 97140 MANUAL THERAPY 1/> REGIONS: CPT | Mod: GP

## 2025-06-26 ENCOUNTER — THERAPY VISIT (OUTPATIENT)
Dept: PHYSICAL THERAPY | Facility: HOSPITAL | Age: 60
End: 2025-06-26
Attending: FAMILY MEDICINE
Payer: COMMERCIAL

## 2025-06-26 DIAGNOSIS — M54.2 NECK PAIN: Primary | ICD-10-CM

## 2025-06-26 PROCEDURE — 97140 MANUAL THERAPY 1/> REGIONS: CPT | Mod: GP,CQ

## 2025-06-26 PROCEDURE — 97110 THERAPEUTIC EXERCISES: CPT | Mod: GP,CQ

## 2025-08-19 ENCOUNTER — THERAPY VISIT (OUTPATIENT)
Dept: PHYSICAL THERAPY | Facility: HOSPITAL | Age: 60
End: 2025-08-19
Attending: FAMILY MEDICINE
Payer: COMMERCIAL

## 2025-08-19 DIAGNOSIS — M54.2 NECK PAIN: Primary | ICD-10-CM

## 2025-08-19 PROCEDURE — 97110 THERAPEUTIC EXERCISES: CPT | Mod: GP

## 2025-08-19 PROCEDURE — 999N000104 HC STATISTIC NO CHARGE

## (undated) RX ORDER — FENTANYL CITRATE 50 UG/ML
INJECTION, SOLUTION INTRAMUSCULAR; INTRAVENOUS
Status: DISPENSED
Start: 2024-12-20

## (undated) RX ORDER — FENTANYL CITRATE 50 UG/ML
INJECTION, SOLUTION INTRAMUSCULAR; INTRAVENOUS
Status: DISPENSED
Start: 2024-12-06